# Patient Record
Sex: MALE | Race: WHITE | Employment: OTHER | URBAN - METROPOLITAN AREA
[De-identification: names, ages, dates, MRNs, and addresses within clinical notes are randomized per-mention and may not be internally consistent; named-entity substitution may affect disease eponyms.]

---

## 2018-07-26 ENCOUNTER — ANESTHESIA EVENT (OUTPATIENT)
Dept: ENDOSCOPY | Age: 53
End: 2018-07-26
Payer: COMMERCIAL

## 2018-07-26 RX ORDER — SODIUM CHLORIDE, SODIUM LACTATE, POTASSIUM CHLORIDE, CALCIUM CHLORIDE 600; 310; 30; 20 MG/100ML; MG/100ML; MG/100ML; MG/100ML
25 INJECTION, SOLUTION INTRAVENOUS CONTINUOUS
Status: CANCELLED | OUTPATIENT
Start: 2018-07-26 | End: 2018-07-27

## 2018-07-26 RX ORDER — SODIUM CHLORIDE 0.9 % (FLUSH) 0.9 %
5-10 SYRINGE (ML) INJECTION AS NEEDED
Status: CANCELLED | OUTPATIENT
Start: 2018-07-26

## 2018-07-27 ENCOUNTER — ANESTHESIA (OUTPATIENT)
Dept: ENDOSCOPY | Age: 53
End: 2018-07-27
Payer: COMMERCIAL

## 2018-08-16 RX ORDER — OXYCODONE HYDROCHLORIDE 5 MG/1
10 TABLET ORAL
Status: CANCELLED | OUTPATIENT
Start: 2018-08-16 | End: 2018-08-17

## 2018-08-16 RX ORDER — OXYCODONE HYDROCHLORIDE 5 MG/1
5 TABLET ORAL
Status: CANCELLED | OUTPATIENT
Start: 2018-08-16 | End: 2018-08-17

## 2018-08-16 RX ORDER — HYDROMORPHONE HYDROCHLORIDE 2 MG/ML
0.5 INJECTION, SOLUTION INTRAMUSCULAR; INTRAVENOUS; SUBCUTANEOUS
Status: CANCELLED | OUTPATIENT
Start: 2018-08-16

## 2018-08-16 RX ORDER — ACETAMINOPHEN 500 MG
500 TABLET ORAL ONCE
Status: CANCELLED | OUTPATIENT
Start: 2018-08-16 | End: 2018-08-16

## 2018-08-16 RX ORDER — ONDANSETRON 2 MG/ML
4 INJECTION INTRAMUSCULAR; INTRAVENOUS ONCE
Status: CANCELLED | OUTPATIENT
Start: 2018-08-16 | End: 2018-08-16

## 2018-08-16 RX ORDER — SODIUM CHLORIDE, SODIUM LACTATE, POTASSIUM CHLORIDE, CALCIUM CHLORIDE 600; 310; 30; 20 MG/100ML; MG/100ML; MG/100ML; MG/100ML
75 INJECTION, SOLUTION INTRAVENOUS CONTINUOUS
Status: CANCELLED | OUTPATIENT
Start: 2018-08-16

## 2018-08-16 RX ORDER — DIPHENHYDRAMINE HYDROCHLORIDE 50 MG/ML
12.5 INJECTION, SOLUTION INTRAMUSCULAR; INTRAVENOUS ONCE
Status: CANCELLED | OUTPATIENT
Start: 2018-08-16 | End: 2018-08-16

## 2018-08-16 RX ORDER — SODIUM CHLORIDE 0.9 % (FLUSH) 0.9 %
5-10 SYRINGE (ML) INJECTION AS NEEDED
Status: CANCELLED | OUTPATIENT
Start: 2018-08-16

## 2018-08-16 RX ORDER — NALOXONE HYDROCHLORIDE 0.4 MG/ML
0.1 INJECTION, SOLUTION INTRAMUSCULAR; INTRAVENOUS; SUBCUTANEOUS AS NEEDED
Status: CANCELLED | OUTPATIENT
Start: 2018-08-16 | End: 2018-08-16

## 2018-08-17 ENCOUNTER — HOSPITAL ENCOUNTER (OUTPATIENT)
Age: 53
Setting detail: OUTPATIENT SURGERY
Discharge: HOME | End: 2018-08-17
Attending: SURGERY | Admitting: SURGERY
Payer: COMMERCIAL

## 2018-08-17 VITALS
WEIGHT: 210 LBS | BODY MASS INDEX: 32.96 KG/M2 | SYSTOLIC BLOOD PRESSURE: 179 MMHG | DIASTOLIC BLOOD PRESSURE: 90 MMHG | OXYGEN SATURATION: 98 % | HEIGHT: 67 IN | HEART RATE: 86 BPM | RESPIRATION RATE: 18 BRPM | TEMPERATURE: 98.6 F

## 2018-08-17 PROCEDURE — 76060000032 HC ANESTHESIA 0.5 TO 1 HR: Performed by: SURGERY

## 2018-08-17 PROCEDURE — 74011250636 HC RX REV CODE- 250/636: Performed by: ANESTHESIOLOGY

## 2018-08-17 PROCEDURE — 74011250636 HC RX REV CODE- 250/636

## 2018-08-17 PROCEDURE — 76040000025: Performed by: SURGERY

## 2018-08-17 RX ORDER — SODIUM CHLORIDE 0.9 % (FLUSH) 0.9 %
5-10 SYRINGE (ML) INJECTION AS NEEDED
Status: DISCONTINUED | OUTPATIENT
Start: 2018-08-17 | End: 2018-08-17 | Stop reason: HOSPADM

## 2018-08-17 RX ORDER — FAMOTIDINE 20 MG/1
20 TABLET, FILM COATED ORAL
Status: DISCONTINUED | OUTPATIENT
Start: 2018-08-17 | End: 2018-08-17 | Stop reason: HOSPADM

## 2018-08-17 RX ORDER — PROPOFOL 10 MG/ML
INJECTION, EMULSION INTRAVENOUS
Status: DISCONTINUED | OUTPATIENT
Start: 2018-08-17 | End: 2018-08-17 | Stop reason: HOSPADM

## 2018-08-17 RX ORDER — SODIUM CHLORIDE, SODIUM LACTATE, POTASSIUM CHLORIDE, CALCIUM CHLORIDE 600; 310; 30; 20 MG/100ML; MG/100ML; MG/100ML; MG/100ML
1000 INJECTION, SOLUTION INTRAVENOUS CONTINUOUS
Status: DISCONTINUED | OUTPATIENT
Start: 2018-08-17 | End: 2018-08-17 | Stop reason: HOSPADM

## 2018-08-17 RX ORDER — LIDOCAINE HYDROCHLORIDE 10 MG/ML
0.1 INJECTION INFILTRATION; PERINEURAL AS NEEDED
Status: DISCONTINUED | OUTPATIENT
Start: 2018-08-17 | End: 2018-08-17 | Stop reason: HOSPADM

## 2018-08-17 RX ORDER — PROPOFOL 10 MG/ML
INJECTION, EMULSION INTRAVENOUS AS NEEDED
Status: DISCONTINUED | OUTPATIENT
Start: 2018-08-17 | End: 2018-08-17 | Stop reason: HOSPADM

## 2018-08-17 RX ORDER — LIDOCAINE HYDROCHLORIDE 20 MG/ML
INJECTION, SOLUTION EPIDURAL; INFILTRATION; INTRACAUDAL; PERINEURAL AS NEEDED
Status: DISCONTINUED | OUTPATIENT
Start: 2018-08-17 | End: 2018-08-17 | Stop reason: HOSPADM

## 2018-08-17 RX ORDER — SODIUM CHLORIDE 0.9 % (FLUSH) 0.9 %
5-10 SYRINGE (ML) INJECTION EVERY 8 HOURS
Status: DISCONTINUED | OUTPATIENT
Start: 2018-08-17 | End: 2018-08-17 | Stop reason: HOSPADM

## 2018-08-17 RX ORDER — MIDAZOLAM HYDROCHLORIDE 1 MG/ML
2 INJECTION, SOLUTION INTRAMUSCULAR; INTRAVENOUS
Status: DISCONTINUED | OUTPATIENT
Start: 2018-08-17 | End: 2018-08-17 | Stop reason: HOSPADM

## 2018-08-17 RX ORDER — FENTANYL CITRATE 50 UG/ML
100 INJECTION, SOLUTION INTRAMUSCULAR; INTRAVENOUS AS NEEDED
Status: DISCONTINUED | OUTPATIENT
Start: 2018-08-17 | End: 2018-08-17 | Stop reason: HOSPADM

## 2018-08-17 RX ORDER — SODIUM CHLORIDE, SODIUM LACTATE, POTASSIUM CHLORIDE, CALCIUM CHLORIDE 600; 310; 30; 20 MG/100ML; MG/100ML; MG/100ML; MG/100ML
100 INJECTION, SOLUTION INTRAVENOUS CONTINUOUS
Status: DISCONTINUED | OUTPATIENT
Start: 2018-08-17 | End: 2018-08-17 | Stop reason: HOSPADM

## 2018-08-17 RX ADMIN — PROPOFOL 20 MG: 10 INJECTION, EMULSION INTRAVENOUS at 12:20

## 2018-08-17 RX ADMIN — PROPOFOL 140 MCG/KG/MIN: 10 INJECTION, EMULSION INTRAVENOUS at 12:20

## 2018-08-17 RX ADMIN — PROPOFOL 80 MG: 10 INJECTION, EMULSION INTRAVENOUS at 12:19

## 2018-08-17 RX ADMIN — SODIUM CHLORIDE, SODIUM LACTATE, POTASSIUM CHLORIDE, AND CALCIUM CHLORIDE 1000 ML: 600; 310; 30; 20 INJECTION, SOLUTION INTRAVENOUS at 12:03

## 2018-08-17 RX ADMIN — LIDOCAINE HYDROCHLORIDE 50 MG: 20 INJECTION, SOLUTION EPIDURAL; INFILTRATION; INTRACAUDAL; PERINEURAL at 12:19

## 2018-08-17 NOTE — IP AVS SNAPSHOT
Michelet Gregorio 
 
 
 [address] [facility] [address] [facility] 
[phone] Patient: Best Banda MRN: [id number] Amaris King About your hospitalization You were admitted on:  August 17, 2018 You last received care in the:  [facility] You were discharged on:  August 17, 2018 Why you were hospitalized Your primary diagnosis was:  Not on File Follow-up Information None Your Scheduled Appointments Friday August 31, 2018  9:10 AM EDT Office Visit with Adry Martin MD  
[address] [place] [address]  
[phone] Discharge Orders None A check santy indicates which time of day the medication should be taken. My Medications ASK your doctor about these medications Instructions Each Dose to Equal  
 Morning Noon Evening Bedtime  
 amLODIPine 5 mg tablet Commonly known as:  Love Breach Your last dose was: Your next dose is: Take 1 Tab by mouth daily. 5 mg  
    
   
   
   
  
 aspirin delayed-release 81 mg tablet Your last dose was: Your next dose is: Take  by mouth daily. atorvastatin 20 mg tablet Commonly known as:  LIPITOR Your last dose was: Your next dose is: Take 1 Tab by mouth daily. 20 mg CALCIUM 600 + D 600-125 mg-unit Tab Generic drug:  calcium-cholecalciferol (d3) Your last dose was: Your next dose is: Take 1 Tab by mouth daily. 1 Tab COREG 25 mg tablet Generic drug:  carvedilol Your last dose was: Your next dose is: Take 25 mg by mouth two (2) times daily (with meals). 25 mg  
    
   
   
   
  
 nitroglycerin 0.4 mg SL tablet Commonly known as:  NITROSTAT Your last dose was: Your next dose is:    
   
   
 by SubLINGual route every five (5) minutes as needed for Chest Pain. omeprazole 20 mg tablet Commonly known as:  PRILOSEC OTC Your last dose was: Your next dose is: Take 20 mg by mouth daily. 20 mg  
    
   
   
   
  
 prasugrel 10 mg tablet Commonly known as:  EFFIENT Your last dose was: Your next dose is: Take 10 mg by mouth daily. Stopped 8/10/18 per pt per dr Sabrina Nissen and dr Edwardo Reagan 10 mg  
    
   
   
   
  
 venlafaxine- mg capsule Commonly known as:  EFFEXOR-XR Your last dose was: Your next dose is: Take 1 Cap by mouth daily. 150 mg  
    
   
   
   
  
 VITAMIN C 500 mg tablet Generic drug:  ascorbic acid (vitamin C) Your last dose was: Your next dose is: Take 500 mg by mouth daily. 500 mg Discharge Instructions Steve Boone M.D. 
[phone] Instructions following colonoscopy: 
 
ACTIVITY: 
? Resume usual, basic activities around the house today. ? You may be light-headed or sleepy from anesthesia, so be careful going up and down stairs. ? Avoid driving, operating machinery, or signing documents for 24 hours. DIET: 
? No restriction. Please note, some people may have nausea or cramps after this procedure which can result in an upset stomach after eating. ? Many people have loose stools or diarrhea immediately after colonoscopy. It is also not uncommon to not have a bowel movement for 2-3 days. PAIN: 
? Some cramping or gas pain is normal after colonoscopy. However, if you experience worsening pain over the course of the day, or pain with associated fever please call the office immediately CALL THE DOCTOR IF: 
? You have a temperature higher than 101.5° Fahrenheit for more than 6 hours. ? You have severe nausea or vomiting not relieved by medication; or diarrhea. If you take a blood thinning medicine resume it: 
 
Otherwise, continue home medications as previously prescribed. Introducing [facility]! [employer] introduces [email or web address] patient portal. Now you can access parts of your medical record, email your doctor's office, and request medication refills online. 1. In your internet browser, go to [email or web address] 2. Click on the First Time User? Click Here link in the Sign In box. You will see the New Member Sign Up page. 3. Enter your Rover.com Access Code exactly as it appears below. You will not need to use this code after youve completed the sign-up process. If you do not sign up before the expiration date, you must request a new code. · [email or web address] Access Code: [email or web address] Expires: 10/22/2018 10:58 AM 
 
4. Enter the last four digits of your Social Security Number (xxxx) and Date of Birth (mm/dd/yyyy) as indicated and click Submit. You will be taken to the next sign-up page. 5. Create a Rover.com ID. This will be your Rover.com login ID and cannot be changed, so think of one that is secure and easy to remember. 6. Create a Rover.com password. You can change your password at any time. 7. Enter your Password Reset Question and Answer. This can be used at a later time if you forget your password. 8. Enter your e-mail address. You will receive e-mail notification when new information is available in [address] 9. Click Sign Up. You can now view and download portions of your medical record. 10. Click the Download Summary menu link to download a portable copy of your medical information. If you have questions, please visit the Frequently Asked Questions section of the Rover.com website. Remember, Rover.com is NOT to be used for urgent needs. For medical emergencies, dial 911. Now available from your iPhone and Android! Introducing [employer] As a [facility] patient, I wanted to make you aware of our electronic visit tool called Sotero Villa. Snapwiz allows you to connect within minutes with a medical provider 24 hours a day, seven days a week via a mobile device or tablet or logging into a secure website from your computer. You can access Sotero Villa from anywhere in the [place]. A virtual visit might be right for you when you have a simple condition and feel like you just dont want to get out of bed, or cant get away from work for an appointment, when your regular [facility] provider is not available (evenings, weekends or holidays), or when youre out of town and need minor care. Electronic visits cost only $49 and if the [facility] provider determines a prescription is needed to treat your condition, one can be electronically transmitted to a nearby pharmacy*. Please take a moment to enroll today if you have not already done so. The enrollment process is free and takes just a few minutes. To enroll, please download the Snapwiz roshni to your tablet or phone, or visit [email or web address] to enroll on your computer. And, as an [address] [place] [address] patient with a BEKIZ account, the results of your visits will be scanned into your electronic medical record and your primary care provider will be able to view the scanned results. We urge you to continue to see your regular [facility] provider for your ongoing medical care. And while your primary care provider may not be the one available when you seek a Sotero Villa virtual visit, the peace of mind you get from getting a real diagnosis real time can be priceless. For more information on Sotero Villa, view our Frequently Asked Questions (FAQs) at [email or web address]. Sincerely, 
 
Chelsea Burnett MD 
Chief Medical Officer [facility] *:  certain medications cannot be prescribed via Sotero Villa Providers Seen During Your Hospitalization Provider Specialty Primary office phone Carolina Toribio MD General Surgery [phone] Your Primary Care Physician (PCP) Primary Care Physician Office Phone Office Fax [phone] [facility], [address] [place] [address] [phone] You are allergic to the following No active allergies Recent Documentation Height Weight BMI Smoking Status 1.702 m 95.3 kg 32.89 kg/m2 Current Every Day Smoker Emergency Contacts Name Discharge Info Relation Home Work Mobile Kimberley Perla  Spouse [3] [phone] Patient Belongings The following personal items are in your possession at time of discharge: 
  Dental Appliances: None  Visual Aid: None Please provide this summary of care documentation to your next provider. Signatures-by signing, you are acknowledging that this After Visit Summary has been reviewed with you and you have received a copy. Patient Signature:  ____________________________________________________________ Date:  ____________________________________________________________  
  
[facility] Signature:  ____________________________________________________________ Date:  ____________________________________________________________

## 2018-08-17 NOTE — PROCEDURES
Procedure in Detail:  Informed consent was obtained for the procedure. The patient was placed in the left lateral decubitus position and sedation was induced by anesthesia. The [id number] was inserted into the rectum and advanced under direct vision to the cecum, which was identified by the ileocecal valve and appendiceal orifice. The quality of the colonic preparation was adequate. A careful inspection was made as the colonoscope was withdrawn, including a retroflexed view of the rectum; findings and interventions are described below. Appropriate photodocumentation was obtained. Findings:   ANUS: Anal exam reveals no masses or hemorrhoids, sphincter tone is normal.   RECTUM: Rectal exam reveals no masses or hemorrhoids. SIGMOID COLON: The mucosa is normal with good vascular pattern and without ulcers, diverticula, and polyps. DESCENDING COLON: The mucosa is normal with good vascular pattern and without ulcers, diverticula, and polyps. SPLENIC FLEXURE: The splenic flexure is normal.   TRANSVERSE COLON: The mucosa is normal with good vascular pattern and without ulcers, diverticula, and polyps. HEPATIC FLEXURE: The hepatic flexure is normal.   ASCENDING COLON: The mucosa is normal with good vascular pattern and without ulcers, diverticula, and polyps. CECUM: The appendiceal orifice appears normal. The ileocecal valve appears normal.   TERMINAL ILEUM: The terminal ileum was not entered. Specimens: No specimens were collected. Complications: None; patient tolerated the procedure well. \    EBL - none    Recommendations:   - For colon cancer screening in this average-risk patient, colonoscopy may be repeated in 10 years.      Signed By: Abraham Liang MD                        August 17, 2018

## 2018-08-17 NOTE — H&P
Colonoscopy History and Physical      Patient: Hong Ladd    Physician: Mk Grace MD    Referring Physician: Avtar Mathews MD    Chief Complaint: For colonoscopy    History of Present Illness: Pt presents for colonoscopy. Initial study. History:  Past Medical History:   Diagnosis Date    CAD (coronary artery disease)     mi--2014--- cabg 2014--- followed by dr Marika Junior Depressive disorder, not elsewhere classified     Essential hypertension, benign     controlled with med    Obese     bmi 28    Pure hypercholesterolemia     Sleep apnea     dx 12 yrs ago-- per pt-- never had any follow up     Past Surgical History:   Procedure Laterality Date    CARDIAC SURG PROCEDURE UNLIST  2014    CABG     HX KNEE ARTHROSCOPY Right YRS AGO      Social History     Social History    Marital status:      Spouse name: N/A    Number of children: N/A    Years of education: N/A     Social History Main Topics    Smoking status: Current Every Day Smoker     Packs/day: 0.50     Years: 25.00    Smokeless tobacco: Never Used    Alcohol use Yes      Comment: occasional    Drug use: No    Sexual activity: Not on file     Other Topics Concern    Not on file     Social History Narrative      Family History   Problem Relation Age of Onset    Cancer Mother      leukemia    Heart Disease Father      CABG, both parents also, HTN and high lipids       Medications:   Prior to Admission medications    Medication Sig Start Date End Date Taking? Authorizing Provider   calcium-cholecalciferol, d3, (CALCIUM 600 + D) 600-125 mg-unit tab Take 1 Tab by mouth daily. Historical Provider   ascorbic acid, vitamin C, (VITAMIN C) 500 mg tablet Take 500 mg by mouth daily. Historical Provider   carvedilol (COREG) 25 mg tablet Take 25 mg by mouth two (2) times daily (with meals). Historical Provider   venlafaxine-SR [facility] 150 mg capsule Take 1 Cap by mouth daily.  1/19/18   Avtar Mathews MD amLODIPine (NORVASC) 5 mg tablet Take 1 Tab by mouth daily. 1/19/18   Juliette Kuo MD   atorvastatin (LIPITOR) 20 mg tablet Take 1 Tab by mouth daily. 1/19/18   Juliette Kuo MD   omeprazole (PRILOSEC OTC) 20 mg tablet Take 20 mg by mouth daily. 1/19/18   Juliette Kuo MD   aspirin delayed-release 81 mg tablet Take  by mouth daily. Historical Provider   nitroglycerin (NITROSTAT) 0.4 mg SL tablet by SubLINGual route every five (5) minutes as needed for Chest Pain. Historical Provider   prasugrel (EFFIENT) 10 mg tablet Take 10 mg by mouth daily. Stopped 8/10/18 per pt per dr Jaya Mccain and dr Dimas Gooden Provider       Allergies: No Known Allergies    Physical Exam:     Vital Signs:   Visit Vitals    Temp 98.3 °F (36.8 °C)    Ht 5' 7\" (1.702 m)    Wt 210 lb (95.3 kg)    BMI 32.89 kg/m2     . General: in NAD      Heart: regular   Lungs: unlabored   Abdominal: soft   Neurological: grossly normal        Findings/Diagnosis: Screening for colorectal cancer     Plan of Care/Planned Procedure: Colonoscopy. Risks of endoscopy include  bleeding, perforation. They understand and agree to proceed.       Signed:  Ryder Caballero MD   8/17/2018

## 2018-08-17 NOTE — ANESTHESIA PREPROCEDURE EVALUATION
Anesthetic History   No history of anesthetic complications            Review of Systems / Medical History  Patient summary reviewed and pertinent labs reviewed    Pulmonary        Sleep apnea           Neuro/Psych         Psychiatric history     Cardiovascular    Hypertension          CAD    Exercise tolerance: >4 METS  Comments: MI/CABG 2014   GI/Hepatic/Renal  Within defined limits              Endo/Other        Obesity     Other Findings              Physical Exam    Airway  Mallampati: II  TM Distance: 4 - 6 cm  Neck ROM: normal range of motion   Mouth opening: Normal     Cardiovascular    Rhythm: regular  Rate: normal         Dental  No notable dental hx       Pulmonary  Breath sounds clear to auscultation               Abdominal  GI exam deferred       Other Findings            Anesthetic Plan    ASA: 3  Anesthesia type: total IV anesthesia          Induction: Intravenous  Anesthetic plan and risks discussed with: Patient

## 2018-08-17 NOTE — DISCHARGE INSTRUCTIONS
Kodi Rogers M.D.  [phone]    Instructions following colonoscopy:    ACTIVITY:   Resume usual, basic activities around the house today.  You may be light-headed or sleepy from anesthesia, so be careful going up and down stairs.  Avoid driving, operating machinery, or signing documents for 24 hours. DIET:   No restriction. Please note, some people may have nausea or cramps after this procedure which can result in an upset stomach after eating.  Many people have loose stools or diarrhea immediately after colonoscopy. It is also not uncommon to not have a bowel movement for 2-3 days. PAIN:   Some cramping or gas pain is normal after colonoscopy. However, if you experience worsening pain over the course of the day, or pain with associated fever please call the office immediately      [address] IF:   You have a temperature higher than 101.5° Fahrenheit for more than 6 hours.  You have severe nausea or vomiting not relieved by medication; or diarrhea. If you take a blood thinning medicine resume it:    Otherwise, continue home medications as previously prescribed.

## 2018-08-17 NOTE — ANESTHESIA POSTPROCEDURE EVALUATION
Post-Anesthesia Evaluation and Assessment    Patient: Marquita Rojas MRN: [id number]  SSN: [id number]    YOB: 1965  Age: 45 y.o. Sex: male       Cardiovascular Function/Vital Signs  Visit Vitals    /88    Pulse 92    Temp 37 °C (98.6 °F)    Resp 16    Ht 5' 7\" (1.702 m)    Wt 95.3 kg (210 lb)    SpO2 97%    BMI 32.89 kg/m2       Patient is status post total IV anesthesia anesthesia for Procedure(s):  COLONOSCOPY/ 32.    Nausea/Vomiting: None    Postoperative hydration reviewed and adequate. Pain:  Pain Scale 1: Visual (08/17/18 1245)  Pain Intensity 1: 0 (08/17/18 1245)   Managed    Neurological Status: At baseline    Mental Status and Level of Consciousness: Arousable    Pulmonary Status:   O2 Device: CO2 nasal cannula (08/17/18 1246)   Adequate oxygenation and airway patent    Complications related to anesthesia: None    Post-anesthesia assessment completed.  No concerns    Signed By: Lucinda Otto MD     August 17, 2018

## 2018-10-09 PROBLEM — M48.062 LUMBAR STENOSIS WITH NEUROGENIC CLAUDICATION: Status: ACTIVE | Noted: 2018-10-09

## 2018-10-09 PROBLEM — M43.16 SPONDYLOLISTHESIS AT L3-L4 LEVEL: Status: ACTIVE | Noted: 2018-10-09

## 2018-11-12 ENCOUNTER — HOSPITAL ENCOUNTER (OUTPATIENT)
Dept: SURGERY | Age: 53
Discharge: HOME | DRG: 460 | End: 2018-11-12
Payer: COMMERCIAL

## 2018-11-12 VITALS
TEMPERATURE: 98.2 F | DIASTOLIC BLOOD PRESSURE: 80 MMHG | RESPIRATION RATE: 18 BRPM | HEIGHT: 69 IN | HEART RATE: 72 BPM | OXYGEN SATURATION: 98 % | WEIGHT: 210 LBS | SYSTOLIC BLOOD PRESSURE: 142 MMHG | BODY MASS INDEX: 31.1 KG/M2

## 2018-11-12 LAB
ANION GAP SERPL CALC-SCNC: 6 MMOL/L (ref 7–16)
APPEARANCE UR: CLEAR
BACTERIA SPEC CULT: NORMAL
BASOPHILS # BLD: 0.1 K/UL (ref 0–0.2)
BASOPHILS NFR BLD: 0 % (ref 0–2)
BILIRUB UR QL: NEGATIVE
BUN SERPL-MCNC: 17 MG/DL (ref 6–23)
CALCIUM SERPL-MCNC: 9.4 MG/DL (ref 8.3–10.4)
CHLORIDE SERPL-SCNC: 107 MMOL/L (ref 98–107)
CO2 SERPL-SCNC: 27 MMOL/L (ref 21–32)
COLOR UR: YELLOW
CREAT SERPL-MCNC: 0.79 MG/DL (ref 0.8–1.5)
DIFFERENTIAL METHOD BLD: ABNORMAL
EOSINOPHIL # BLD: 0.1 K/UL (ref 0–0.8)
EOSINOPHIL NFR BLD: 1 % (ref 0.5–7.8)
ERYTHROCYTE [DISTWIDTH] IN BLOOD BY AUTOMATED COUNT: 13.3 %
GLUCOSE SERPL-MCNC: 94 MG/DL (ref 65–100)
GLUCOSE UR STRIP.AUTO-MCNC: NEGATIVE MG/DL
HCT VFR BLD AUTO: 41.8 % (ref 41.1–50.3)
HGB BLD-MCNC: 13.6 G/DL (ref 13.6–17.2)
HGB UR QL STRIP: NEGATIVE
IMM GRANULOCYTES # BLD: 0.1 K/UL (ref 0–0.5)
IMM GRANULOCYTES NFR BLD AUTO: 1 % (ref 0–5)
KETONES UR QL STRIP.AUTO: NEGATIVE MG/DL
LEUKOCYTE ESTERASE UR QL STRIP.AUTO: NEGATIVE
LYMPHOCYTES # BLD: 3.6 K/UL (ref 0.5–4.6)
LYMPHOCYTES NFR BLD: 32 % (ref 13–44)
MCH RBC QN AUTO: 31.6 PG (ref 26.1–32.9)
MCHC RBC AUTO-ENTMCNC: 32.5 G/DL (ref 31.4–35)
MCV RBC AUTO: 97 FL (ref 79.6–97.8)
MONOCYTES # BLD: 1.1 K/UL (ref 0.1–1.3)
MONOCYTES NFR BLD: 10 % (ref 4–12)
NEUTS SEG # BLD: 6.3 K/UL (ref 1.7–8.2)
NEUTS SEG NFR BLD: 56 % (ref 43–78)
NITRITE UR QL STRIP.AUTO: NEGATIVE
NRBC # BLD: 0 K/UL (ref 0–0.2)
PH UR STRIP: 5 [PH] (ref 5–9)
PLATELET # BLD AUTO: 433 K/UL (ref 150–450)
PMV BLD AUTO: 9.4 FL (ref 9.4–12.3)
POTASSIUM SERPL-SCNC: 4.3 MMOL/L (ref 3.5–5.1)
PROT UR STRIP-MCNC: NEGATIVE MG/DL
RBC # BLD AUTO: 4.31 M/UL (ref 4.23–5.6)
SERVICE CMNT-IMP: NORMAL
SODIUM SERPL-SCNC: 140 MMOL/L (ref 136–145)
SP GR UR REFRACTOMETRY: 1.04 (ref 1–1.02)
UROBILINOGEN UR QL STRIP.AUTO: 0.2 EU/DL (ref 0.2–1)
WBC # BLD AUTO: 11.3 K/UL (ref 4.3–11.1)

## 2018-11-12 PROCEDURE — 85025 COMPLETE CBC W/AUTO DIFF WBC: CPT

## 2018-11-12 PROCEDURE — 81003 URINALYSIS AUTO W/O SCOPE: CPT

## 2018-11-12 PROCEDURE — 87641 MR-STAPH DNA AMP PROBE: CPT

## 2018-11-12 PROCEDURE — 80048 BASIC METABOLIC PNL TOTAL CA: CPT

## 2018-11-12 NOTE — PERIOP NOTES
Patient verified name, , and surgery Order for consent found in EHR and matches case posting. Pt had thought Dr Jonathon Landau mentioned something about his L5 being involved. Undersigned called the office and spoke with Fany Feli- she said Dr Solomon's H&P show it is only L3 and L4 he is working on but she will have the nurse, Gloria, call the pt to discuss TYPE  CASE: III  Preassessment complete Orders per surgeon: to include MRSA swab of nares Labs per surgeon: CBC, BMP ; UA. Results: all normal/ NEGATIVE SA Labs per anesthesia protocol: none EKG: In chart- stress test 9/3/18 Nasal Swab collected per MD order and instructions for Mupirocin nasal ointment if required. Patient provided with and instructed on education handouts including Guide to Surgery, blood transfusions, pain management, and hand hygiene for the family and community, and [facility] brochure. Road to Recovery Spine surgery patient guide given. Long handled prehab sponge given with instructions for use. Patient viewed spine prehab video. Hibiclens and instructions given per hospital policy. Instructed patient to continue previous medications as prescribed prior to surgery unless otherwise directed and to take the following medications the day of surgery according to anesthesia guidelines : norvasc, ASA 81mg, coreg, prilosec, effexor, tramadol prn . Instructed patient to hold  the following medications on the day of surgery: Malik Kessler is FU with cardiologist Dr Betty Hill. She had gotten permission from pt's PCP. Original medication prescription bottles visualized during patient appointment. Patient teach back successful and patient demonstrates knowledge of instruction.

## 2018-11-13 NOTE — H&P
Nickolas Harris [address] HISTORY AND PHYSICAL Derick Muniz 
MR#: [id number] : 1965 ACCOUNT #: [de-identified] ADMIT DATE: 2018 CHIEF COMPLAINT:  Lower extremity neurogenic claudication times years. HISTORY OF PRESENT ILLNESS:  A 67-year-old gentleman with a greater than 3-year history of neurogenic claudication, right greater than left, refractory to conservative measures. Aggressive conservative measures have been attempted. MRI scanning was positive for spinal stenosis and spondylolisthesis at L3-4 with a large right-sided disk protrusion centrally causing compression. ALLERGIES:  NONE. PAST MEDICAL HISTORY:  Significant for coronary artery disease, chronic pain, depression, hypertension, obesity, hypercholesterolemia and sleep apnea. FAMILY HISTORY:  Positive for cancer and heart disease. SOCIAL HISTORY:  Reveals he is . He is an every day smoker, half pack per day. He does use alcohol. REVIEW OF SYSTEMS:  Negative for chest pain, shortness of breath or fatigue. PHYSICAL EXAMINATION: 
VITAL SIGNS:  Stable, afebrile. HEENT:  Unremarkable. Nose and throat clear. CHEST:  Clear bilaterally. HEART:  Regular rate and rhythm. No murmurs or gallops. ABDOMEN:  Soft, benign, nontender, no masses. Bowel sounds positive. EXTREMITIES:  Free of deformities. NEUROLOGIC:  Awake, alert, oriented x3. Cranial nerves II-XII intact. Motor strength 5/5 except for the right quadriceps which is weak at 4/5. Normal bulk and tone. Reflexes symmetric. Sensation normal.  Antalgic gait. IMPRESSION:   
1. Lumbar spondylolisthesis with spinal stenosis, L3-L4. 2.  Neurogenic claudication. Refractory to conservative measures. PLAN:  L3-L4 transforaminal lumbar interbody fusion. The risks are higher due to tobacco abuse and he understands this.   The risks were thoroughly explained and include bleeding, infection, weakness, numbness, nonunion, malunion, hardware removal due to infection, persistent pain, neurological deficiency, heart attack, stroke and death. The patient understands and agrees to proceed. MD YO Butcher/OFELIA 
D: 11/13/2018 10:53 T: 11/13/2018 11:01 
JOB #: [id number]

## 2018-11-13 NOTE — PERIOP NOTES
See [employer]ient hold note in care everywhere. Note states ok to hold after 11/18/18. Surgery is for 11/14/18. Spoke with Felipe Severino at Dr. Cayla Adler office and she states she spoke to staff at [facility] and was told told this date was in error. Ok to hold 5 days prior to surgery. Felipe Severino was told staff would put a note in care everywhere today, stating this. Note not yet visible.

## 2018-11-14 ENCOUNTER — ANESTHESIA EVENT (OUTPATIENT)
Dept: SURGERY | Age: 53
DRG: 460 | End: 2018-11-14
Payer: COMMERCIAL

## 2018-11-14 ENCOUNTER — ANESTHESIA (OUTPATIENT)
Dept: SURGERY | Age: 53
DRG: 460 | End: 2018-11-14
Payer: COMMERCIAL

## 2018-11-14 ENCOUNTER — APPOINTMENT (OUTPATIENT)
Dept: GENERAL RADIOLOGY | Age: 53
DRG: 460 | End: 2018-11-14
Attending: NEUROLOGICAL SURGERY
Payer: COMMERCIAL

## 2018-11-14 ENCOUNTER — HOSPITAL ENCOUNTER (INPATIENT)
Age: 53
LOS: 1 days | Discharge: HOME | DRG: 460 | End: 2018-11-15
Attending: NEUROLOGICAL SURGERY | Admitting: NEUROLOGICAL SURGERY
Payer: COMMERCIAL

## 2018-11-14 DIAGNOSIS — M43.16 SPONDYLOLISTHESIS AT L3-L4 LEVEL: ICD-10-CM

## 2018-11-14 LAB
ABO + RH BLD: NORMAL
BLOOD GROUP ANTIBODIES SERPL: NORMAL
SPECIMEN EXP DATE BLD: NORMAL

## 2018-11-14 PROCEDURE — 74011250636 HC RX REV CODE- 250/636: Performed by: NEUROLOGICAL SURGERY

## 2018-11-14 PROCEDURE — C1713 ANCHOR/SCREW BN/BN,TIS/BN: HCPCS | Performed by: NEUROLOGICAL SURGERY

## 2018-11-14 PROCEDURE — 74011250636 HC RX REV CODE- 250/636

## 2018-11-14 PROCEDURE — 77030034475 HC MISC IMPL SPN: Performed by: NEUROLOGICAL SURGERY

## 2018-11-14 PROCEDURE — 77030039267 HC ADH SKN EXOFIN S2SG -B: Performed by: NEUROLOGICAL SURGERY

## 2018-11-14 PROCEDURE — 01NB0ZZ RELEASE LUMBAR NERVE, OPEN APPROACH: ICD-10-PCS | Performed by: NEUROLOGICAL SURGERY

## 2018-11-14 PROCEDURE — 77030028270 HC SRGFL HEMSTAT MTRX J&J -C: Performed by: NEUROLOGICAL SURGERY

## 2018-11-14 PROCEDURE — 0SG00AJ FUSION OF LUMBAR VERTEBRAL JOINT WITH INTERBODY FUSION DEVICE, POSTERIOR APPROACH, ANTERIOR COLUMN, OPEN APPROACH: ICD-10-PCS | Performed by: NEUROLOGICAL SURGERY

## 2018-11-14 PROCEDURE — 77030013567 HC DRN WND RESERV BARD -A: Performed by: NEUROLOGICAL SURGERY

## 2018-11-14 PROCEDURE — 72100 X-RAY EXAM L-S SPINE 2/3 VWS: CPT

## 2018-11-14 PROCEDURE — 77030030163 HC BN WAX J&J -A: Performed by: NEUROLOGICAL SURGERY

## 2018-11-14 PROCEDURE — 77030032490 HC SLV COMPR SCD KNE COVD -B: Performed by: NEUROLOGICAL SURGERY

## 2018-11-14 PROCEDURE — 74011250637 HC RX REV CODE- 250/637: Performed by: NEUROLOGICAL SURGERY

## 2018-11-14 PROCEDURE — 74011000250 HC RX REV CODE- 250: Performed by: NEUROLOGICAL SURGERY

## 2018-11-14 PROCEDURE — 88304 TISSUE EXAM BY PATHOLOGIST: CPT

## 2018-11-14 PROCEDURE — 76210000017 HC OR PH I REC 1.5 TO 2 HR: Performed by: NEUROLOGICAL SURGERY

## 2018-11-14 PROCEDURE — 74011250636 HC RX REV CODE- 250/636: Performed by: ANESTHESIOLOGY

## 2018-11-14 PROCEDURE — 77030014007 HC SPNG HEMSTAT J&J -B: Performed by: NEUROLOGICAL SURGERY

## 2018-11-14 PROCEDURE — 74011000272 HC RX REV CODE- 272: Performed by: NEUROLOGICAL SURGERY

## 2018-11-14 PROCEDURE — 65270000029 HC RM PRIVATE

## 2018-11-14 PROCEDURE — 77030037088 HC TUBE ENDOTRACH ORAL NSL COVD-A: Performed by: ANESTHESIOLOGY

## 2018-11-14 PROCEDURE — 77030003029 HC SUT VCRL J&J -B: Performed by: NEUROLOGICAL SURGERY

## 2018-11-14 PROCEDURE — 76060000035 HC ANESTHESIA 2 TO 2.5 HR: Performed by: NEUROLOGICAL SURGERY

## 2018-11-14 PROCEDURE — 77030021678 HC GLIDESCP STAT DISP VERT -B: Performed by: ANESTHESIOLOGY

## 2018-11-14 PROCEDURE — 74011250637 HC RX REV CODE- 250/637: Performed by: ANESTHESIOLOGY

## 2018-11-14 PROCEDURE — 77030019557 HC ELECTRD VES SEAL MEDT -F: Performed by: NEUROLOGICAL SURGERY

## 2018-11-14 PROCEDURE — 77030034760 HC NDL BN MAR ASPIR JAMSH STRY -B: Performed by: NEUROLOGICAL SURGERY

## 2018-11-14 PROCEDURE — 77030012406 HC DRN WND PENRS BARD -A: Performed by: NEUROLOGICAL SURGERY

## 2018-11-14 PROCEDURE — 77030002916 HC SUT ETHLN J&J -A: Performed by: NEUROLOGICAL SURGERY

## 2018-11-14 PROCEDURE — 77030018390 HC SPNG HEMSTAT2 J&J -B: Performed by: NEUROLOGICAL SURGERY

## 2018-11-14 PROCEDURE — 77030027138 HC INCENT SPIROMETER -A

## 2018-11-14 PROCEDURE — 77030018836 HC SOL IRR NACL ICUM -A: Performed by: NEUROLOGICAL SURGERY

## 2018-11-14 PROCEDURE — 76010000171 HC OR TIME 2 TO 2.5 HR INTENSV-TIER 1: Performed by: NEUROLOGICAL SURGERY

## 2018-11-14 PROCEDURE — 77030034850: Performed by: NEUROLOGICAL SURGERY

## 2018-11-14 PROCEDURE — 86901 BLOOD TYPING SEROLOGIC RH(D): CPT

## 2018-11-14 PROCEDURE — 77030038601 HC DEV SYS W/CANN LITE BIO STRY -F: Performed by: NEUROLOGICAL SURGERY

## 2018-11-14 PROCEDURE — 77030019940 HC BLNKT HYPOTHRM STRY -B: Performed by: ANESTHESIOLOGY

## 2018-11-14 PROCEDURE — 74011000250 HC RX REV CODE- 250

## 2018-11-14 PROCEDURE — 77030039194 HC KT NEURO MONITR ASTU -G: Performed by: NEUROLOGICAL SURGERY

## 2018-11-14 DEVICE — GRAFT BNE SUB 5CC 2MM GRAN ALLOGENIC MORPHOGENETIC PROT W: Type: IMPLANTABLE DEVICE | Site: SPINE LUMBAR | Status: FUNCTIONAL

## 2018-11-14 DEVICE — 6.5MM X 50MM CORTICAL BONE SCREW
Type: IMPLANTABLE DEVICE | Site: SPINE LUMBAR | Status: FUNCTIONAL
Brand: JANUS

## 2018-11-14 DEVICE — TOP LOADING BODY
Type: IMPLANTABLE DEVICE | Site: SPINE LUMBAR | Status: FUNCTIONAL
Brand: FIREBIRD NXG

## 2018-11-14 DEVICE — 50MM ROD, PRE-LORDOSED
Type: IMPLANTABLE DEVICE | Site: SPINE LUMBAR | Status: FUNCTIONAL
Brand: FIREBIRD

## 2018-11-14 RX ORDER — SODIUM CHLORIDE 0.9 % (FLUSH) 0.9 %
5-10 SYRINGE (ML) INJECTION AS NEEDED
Status: DISCONTINUED | OUTPATIENT
Start: 2018-11-14 | End: 2018-11-15 | Stop reason: HOSPADM

## 2018-11-14 RX ORDER — MIDAZOLAM HYDROCHLORIDE 1 MG/ML
2 INJECTION, SOLUTION INTRAMUSCULAR; INTRAVENOUS ONCE
Status: ACTIVE | OUTPATIENT
Start: 2018-11-14 | End: 2018-11-14

## 2018-11-14 RX ORDER — ACETAMINOPHEN 10 MG/ML
1000 INJECTION, SOLUTION INTRAVENOUS
Status: COMPLETED | OUTPATIENT
Start: 2018-11-14 | End: 2018-11-14

## 2018-11-14 RX ORDER — PROPOFOL 10 MG/ML
INJECTION, EMULSION INTRAVENOUS AS NEEDED
Status: DISCONTINUED | OUTPATIENT
Start: 2018-11-14 | End: 2018-11-14 | Stop reason: HOSPADM

## 2018-11-14 RX ORDER — OXYCODONE AND ACETAMINOPHEN 10; 325 MG/1; MG/1
1 TABLET ORAL
Status: DISCONTINUED | OUTPATIENT
Start: 2018-11-14 | End: 2018-11-15 | Stop reason: HOSPADM

## 2018-11-14 RX ORDER — FENTANYL CITRATE 50 UG/ML
100 INJECTION, SOLUTION INTRAMUSCULAR; INTRAVENOUS ONCE
Status: ACTIVE | OUTPATIENT
Start: 2018-11-14 | End: 2018-11-14

## 2018-11-14 RX ORDER — SUCCINYLCHOLINE CHLORIDE 20 MG/ML
INJECTION INTRAMUSCULAR; INTRAVENOUS AS NEEDED
Status: DISCONTINUED | OUTPATIENT
Start: 2018-11-14 | End: 2018-11-14 | Stop reason: HOSPADM

## 2018-11-14 RX ORDER — ATORVASTATIN CALCIUM 10 MG/1
20 TABLET, FILM COATED ORAL DAILY
Status: DISCONTINUED | OUTPATIENT
Start: 2018-11-15 | End: 2018-11-15 | Stop reason: HOSPADM

## 2018-11-14 RX ORDER — PANTOPRAZOLE SODIUM 40 MG/1
40 TABLET, DELAYED RELEASE ORAL
Status: DISCONTINUED | OUTPATIENT
Start: 2018-11-15 | End: 2018-11-15 | Stop reason: HOSPADM

## 2018-11-14 RX ORDER — SODIUM CHLORIDE 0.9 % (FLUSH) 0.9 %
5-10 SYRINGE (ML) INJECTION EVERY 8 HOURS
Status: DISCONTINUED | OUTPATIENT
Start: 2018-11-14 | End: 2018-11-15 | Stop reason: SDUPTHER

## 2018-11-14 RX ORDER — FAMOTIDINE 20 MG/1
20 TABLET, FILM COATED ORAL ONCE
Status: COMPLETED | OUTPATIENT
Start: 2018-11-14 | End: 2018-11-14

## 2018-11-14 RX ORDER — CARVEDILOL 25 MG/1
25 TABLET ORAL
Status: COMPLETED | OUTPATIENT
Start: 2018-11-14 | End: 2018-11-14

## 2018-11-14 RX ORDER — SODIUM CHLORIDE, SODIUM LACTATE, POTASSIUM CHLORIDE, CALCIUM CHLORIDE 600; 310; 30; 20 MG/100ML; MG/100ML; MG/100ML; MG/100ML
75 INJECTION, SOLUTION INTRAVENOUS CONTINUOUS
Status: DISCONTINUED | OUTPATIENT
Start: 2018-11-14 | End: 2018-11-15 | Stop reason: HOSPADM

## 2018-11-14 RX ORDER — SODIUM CHLORIDE, SODIUM LACTATE, POTASSIUM CHLORIDE, CALCIUM CHLORIDE 600; 310; 30; 20 MG/100ML; MG/100ML; MG/100ML; MG/100ML
75 INJECTION, SOLUTION INTRAVENOUS CONTINUOUS
Status: DISCONTINUED | OUTPATIENT
Start: 2018-11-14 | End: 2018-11-14 | Stop reason: HOSPADM

## 2018-11-14 RX ORDER — AMLODIPINE BESYLATE 5 MG/1
5 TABLET ORAL DAILY
Status: DISCONTINUED | OUTPATIENT
Start: 2018-11-15 | End: 2018-11-15 | Stop reason: HOSPADM

## 2018-11-14 RX ORDER — ZOLPIDEM TARTRATE 5 MG/1
10 TABLET ORAL
Status: DISCONTINUED | OUTPATIENT
Start: 2018-11-14 | End: 2018-11-15 | Stop reason: HOSPADM

## 2018-11-14 RX ORDER — ACETAMINOPHEN 325 MG/1
650 TABLET ORAL
Status: DISCONTINUED | OUTPATIENT
Start: 2018-11-14 | End: 2018-11-15 | Stop reason: HOSPADM

## 2018-11-14 RX ORDER — HYDROMORPHONE HYDROCHLORIDE 1 MG/ML
1 INJECTION, SOLUTION INTRAMUSCULAR; INTRAVENOUS; SUBCUTANEOUS
Status: DISCONTINUED | OUTPATIENT
Start: 2018-11-14 | End: 2018-11-15 | Stop reason: HOSPADM

## 2018-11-14 RX ORDER — LISINOPRIL 20 MG/1
20 TABLET ORAL DAILY
Status: DISCONTINUED | OUTPATIENT
Start: 2018-11-15 | End: 2018-11-15 | Stop reason: HOSPADM

## 2018-11-14 RX ORDER — CEFAZOLIN SODIUM/WATER 2 G/20 ML
2 SYRINGE (ML) INTRAVENOUS EVERY 8 HOURS
Status: DISCONTINUED | OUTPATIENT
Start: 2018-11-14 | End: 2018-11-15 | Stop reason: HOSPADM

## 2018-11-14 RX ORDER — OXYCODONE HYDROCHLORIDE 5 MG/1
5 TABLET ORAL
Status: DISCONTINUED | OUTPATIENT
Start: 2018-11-14 | End: 2018-11-14 | Stop reason: HOSPADM

## 2018-11-14 RX ORDER — CEFAZOLIN SODIUM/WATER 2 G/20 ML
2 SYRINGE (ML) INTRAVENOUS ONCE
Status: COMPLETED | OUTPATIENT
Start: 2018-11-14 | End: 2018-11-14

## 2018-11-14 RX ORDER — ROCURONIUM BROMIDE 10 MG/ML
INJECTION, SOLUTION INTRAVENOUS AS NEEDED
Status: DISCONTINUED | OUTPATIENT
Start: 2018-11-14 | End: 2018-11-14 | Stop reason: HOSPADM

## 2018-11-14 RX ORDER — EPHEDRINE SULFATE 50 MG/ML
INJECTION, SOLUTION INTRAVENOUS AS NEEDED
Status: DISCONTINUED | OUTPATIENT
Start: 2018-11-14 | End: 2018-11-14 | Stop reason: HOSPADM

## 2018-11-14 RX ORDER — VENLAFAXINE HYDROCHLORIDE 150 MG/1
150 CAPSULE, EXTENDED RELEASE ORAL DAILY
Status: DISCONTINUED | OUTPATIENT
Start: 2018-11-15 | End: 2018-11-15 | Stop reason: HOSPADM

## 2018-11-14 RX ORDER — HYDROMORPHONE HYDROCHLORIDE 2 MG/ML
0.5 INJECTION, SOLUTION INTRAMUSCULAR; INTRAVENOUS; SUBCUTANEOUS
Status: DISCONTINUED | OUTPATIENT
Start: 2018-11-14 | End: 2018-11-14 | Stop reason: HOSPADM

## 2018-11-14 RX ORDER — CARVEDILOL 25 MG/1
25 TABLET ORAL 2 TIMES DAILY WITH MEALS
Status: DISCONTINUED | OUTPATIENT
Start: 2018-11-14 | End: 2018-11-15 | Stop reason: HOSPADM

## 2018-11-14 RX ORDER — GLYCOPYRROLATE 0.2 MG/ML
INJECTION INTRAMUSCULAR; INTRAVENOUS AS NEEDED
Status: DISCONTINUED | OUTPATIENT
Start: 2018-11-14 | End: 2018-11-14 | Stop reason: HOSPADM

## 2018-11-14 RX ORDER — NEOSTIGMINE METHYLSULFATE 1 MG/ML
INJECTION INTRAVENOUS AS NEEDED
Status: DISCONTINUED | OUTPATIENT
Start: 2018-11-14 | End: 2018-11-14 | Stop reason: HOSPADM

## 2018-11-14 RX ORDER — LIDOCAINE HYDROCHLORIDE 10 MG/ML
0.1 INJECTION INFILTRATION; PERINEURAL AS NEEDED
Status: DISCONTINUED | OUTPATIENT
Start: 2018-11-14 | End: 2018-11-15 | Stop reason: HOSPADM

## 2018-11-14 RX ORDER — LIDOCAINE HYDROCHLORIDE 20 MG/ML
INJECTION, SOLUTION EPIDURAL; INFILTRATION; INTRACAUDAL; PERINEURAL AS NEEDED
Status: DISCONTINUED | OUTPATIENT
Start: 2018-11-14 | End: 2018-11-14 | Stop reason: HOSPADM

## 2018-11-14 RX ORDER — HYDROMORPHONE HYDROCHLORIDE 2 MG/ML
1 INJECTION, SOLUTION INTRAMUSCULAR; INTRAVENOUS; SUBCUTANEOUS ONCE
Status: COMPLETED | OUTPATIENT
Start: 2018-11-14 | End: 2018-11-14

## 2018-11-14 RX ORDER — ONDANSETRON 2 MG/ML
INJECTION INTRAMUSCULAR; INTRAVENOUS AS NEEDED
Status: DISCONTINUED | OUTPATIENT
Start: 2018-11-14 | End: 2018-11-14 | Stop reason: HOSPADM

## 2018-11-14 RX ORDER — OXYCODONE HYDROCHLORIDE 5 MG/1
10 TABLET ORAL
Status: DISCONTINUED | OUTPATIENT
Start: 2018-11-14 | End: 2018-11-14 | Stop reason: HOSPADM

## 2018-11-14 RX ORDER — FENTANYL CITRATE 50 UG/ML
INJECTION, SOLUTION INTRAMUSCULAR; INTRAVENOUS AS NEEDED
Status: DISCONTINUED | OUTPATIENT
Start: 2018-11-14 | End: 2018-11-14 | Stop reason: HOSPADM

## 2018-11-14 RX ORDER — SODIUM CHLORIDE 0.9 % (FLUSH) 0.9 %
5-10 SYRINGE (ML) INJECTION EVERY 8 HOURS
Status: DISCONTINUED | OUTPATIENT
Start: 2018-11-14 | End: 2018-11-15 | Stop reason: HOSPADM

## 2018-11-14 RX ORDER — SODIUM CHLORIDE 0.9 % (FLUSH) 0.9 %
5-10 SYRINGE (ML) INJECTION AS NEEDED
Status: DISCONTINUED | OUTPATIENT
Start: 2018-11-14 | End: 2018-11-15 | Stop reason: SDUPTHER

## 2018-11-14 RX ADMIN — SODIUM CHLORIDE, SODIUM LACTATE, POTASSIUM CHLORIDE, AND CALCIUM CHLORIDE 75 ML/HR: 600; 310; 30; 20 INJECTION, SOLUTION INTRAVENOUS at 06:58

## 2018-11-14 RX ADMIN — EPHEDRINE SULFATE 10 MG: 50 INJECTION, SOLUTION INTRAVENOUS at 09:03

## 2018-11-14 RX ADMIN — HYDROMORPHONE HYDROCHLORIDE 0.5 MG: 2 INJECTION, SOLUTION INTRAMUSCULAR; INTRAVENOUS; SUBCUTANEOUS at 10:38

## 2018-11-14 RX ADMIN — VANCOMYCIN HYDROCHLORIDE 1000 MG: 1 INJECTION, POWDER, LYOPHILIZED, FOR SOLUTION INTRAVENOUS at 06:56

## 2018-11-14 RX ADMIN — SODIUM CHLORIDE, SODIUM LACTATE, POTASSIUM CHLORIDE, AND CALCIUM CHLORIDE 75 ML/HR: 600; 310; 30; 20 INJECTION, SOLUTION INTRAVENOUS at 12:32

## 2018-11-14 RX ADMIN — HYDROMORPHONE HYDROCHLORIDE 1 MG: 2 INJECTION, SOLUTION INTRAMUSCULAR; INTRAVENOUS; SUBCUTANEOUS at 07:18

## 2018-11-14 RX ADMIN — PROPOFOL 200 MG: 10 INJECTION, EMULSION INTRAVENOUS at 08:00

## 2018-11-14 RX ADMIN — OXYCODONE HYDROCHLORIDE AND ACETAMINOPHEN 1 TABLET: 10; 325 TABLET ORAL at 17:48

## 2018-11-14 RX ADMIN — HYDROMORPHONE HYDROCHLORIDE 0.5 MG: 2 INJECTION, SOLUTION INTRAMUSCULAR; INTRAVENOUS; SUBCUTANEOUS at 10:30

## 2018-11-14 RX ADMIN — HYDROMORPHONE HYDROCHLORIDE 1 MG: 1 INJECTION, SOLUTION INTRAMUSCULAR; INTRAVENOUS; SUBCUTANEOUS at 15:45

## 2018-11-14 RX ADMIN — HYDROMORPHONE HYDROCHLORIDE 0.5 MG: 2 INJECTION, SOLUTION INTRAMUSCULAR; INTRAVENOUS; SUBCUTANEOUS at 10:54

## 2018-11-14 RX ADMIN — Medication 1 AMPULE: at 13:08

## 2018-11-14 RX ADMIN — SUCCINYLCHOLINE CHLORIDE 180 MG: 20 INJECTION INTRAMUSCULAR; INTRAVENOUS at 08:00

## 2018-11-14 RX ADMIN — GLYCOPYRROLATE 0.6 MG: 0.2 INJECTION INTRAMUSCULAR; INTRAVENOUS at 09:59

## 2018-11-14 RX ADMIN — SODIUM CHLORIDE, SODIUM LACTATE, POTASSIUM CHLORIDE, AND CALCIUM CHLORIDE 75 ML/HR: 600; 310; 30; 20 INJECTION, SOLUTION INTRAVENOUS at 23:48

## 2018-11-14 RX ADMIN — ACETAMINOPHEN 1000 MG: 10 INJECTION, SOLUTION INTRAVENOUS at 10:44

## 2018-11-14 RX ADMIN — OXYCODONE HYDROCHLORIDE AND ACETAMINOPHEN 1 TABLET: 10; 325 TABLET ORAL at 22:18

## 2018-11-14 RX ADMIN — FENTANYL CITRATE 100 MCG: 50 INJECTION, SOLUTION INTRAMUSCULAR; INTRAVENOUS at 08:00

## 2018-11-14 RX ADMIN — FAMOTIDINE 20 MG: 20 TABLET ORAL at 06:58

## 2018-11-14 RX ADMIN — CARVEDILOL 25 MG: 25 TABLET, FILM COATED ORAL at 07:34

## 2018-11-14 RX ADMIN — ROCURONIUM BROMIDE 5 MG: 10 INJECTION, SOLUTION INTRAVENOUS at 08:00

## 2018-11-14 RX ADMIN — SODIUM CHLORIDE, SODIUM LACTATE, POTASSIUM CHLORIDE, AND CALCIUM CHLORIDE: 600; 310; 30; 20 INJECTION, SOLUTION INTRAVENOUS at 09:33

## 2018-11-14 RX ADMIN — Medication 1 AMPULE: at 20:03

## 2018-11-14 RX ADMIN — CARVEDILOL 25 MG: 25 TABLET, FILM COATED ORAL at 16:01

## 2018-11-14 RX ADMIN — LIDOCAINE HYDROCHLORIDE 100 MG: 20 INJECTION, SOLUTION EPIDURAL; INFILTRATION; INTRACAUDAL; PERINEURAL at 08:00

## 2018-11-14 RX ADMIN — HYDROMORPHONE HYDROCHLORIDE 1 MG: 1 INJECTION, SOLUTION INTRAMUSCULAR; INTRAVENOUS; SUBCUTANEOUS at 20:02

## 2018-11-14 RX ADMIN — Medication 2 G: at 08:15

## 2018-11-14 RX ADMIN — OXYCODONE HYDROCHLORIDE AND ACETAMINOPHEN 1 TABLET: 10; 325 TABLET ORAL at 13:08

## 2018-11-14 RX ADMIN — Medication 3 AMPULE: at 06:58

## 2018-11-14 RX ADMIN — ROCURONIUM BROMIDE 45 MG: 10 INJECTION, SOLUTION INTRAVENOUS at 08:07

## 2018-11-14 RX ADMIN — Medication 2 G: at 15:54

## 2018-11-14 RX ADMIN — Medication 10 ML: at 13:09

## 2018-11-14 RX ADMIN — ONDANSETRON 4 MG: 2 INJECTION INTRAMUSCULAR; INTRAVENOUS at 09:46

## 2018-11-14 RX ADMIN — NEOSTIGMINE METHYLSULFATE 4 MG: 1 INJECTION INTRAVENOUS at 09:59

## 2018-11-14 NOTE — ANESTHESIA PREPROCEDURE EVALUATION
Anesthetic History No history of anesthetic complications Review of Systems / Medical History Patient summary reviewed and pertinent labs reviewed Pulmonary Sleep apnea Neuro/Psych Psychiatric history Cardiovascular Hypertension CAD Exercise tolerance: >4 METS Comments: MI/CABG 2014 GI/Hepatic/Renal 
Within defined limits Endo/Other Obesity Other Findings Physical Exam 
 
Airway Mallampati: II 
TM Distance: > 6 cm Neck ROM: normal range of motion Mouth opening: Normal 
 
 Cardiovascular Rhythm: regular Rate: normal 
 
 
 
 Dental 
No notable dental hx Pulmonary Breath sounds clear to auscultation Abdominal 
GI exam deferred Other Findings Anesthetic Plan ASA: 3 Anesthesia type: general 
 
 
 
 
Induction: Intravenous Anesthetic plan and risks discussed with: Patient

## 2018-11-14 NOTE — ANESTHESIA POSTPROCEDURE EVALUATION
Procedure(s): 
L3-4 SPINE TRANSFORAMINAL LUMBAR INTERBODY FUSION (TLIF). Anesthesia Post Evaluation Multimodal analgesia: multimodal analgesia not used between 6 hours prior to anesthesia start to PACU discharge Patient location during evaluation: PACU Patient participation: complete - patient participated Level of consciousness: awake and alert Pain management: adequate Airway patency: patent Anesthetic complications: no 
Cardiovascular status: hemodynamically stable Respiratory status: acceptable Hydration status: acceptable Visit Vitals /68 Pulse 72 Temp 36.3 °C (97.4 °F) Resp 16 Ht 5' 8.5\" (1.74 m) Wt 96.6 kg (212 lb 14.4 oz) SpO2 98% BMI 31.90 kg/m²

## 2018-11-14 NOTE — PROGRESS NOTES
TRANSFER - IN REPORT: 
 
Verbal report received from Anahi RN(name) on Homero Kaplan  being received from [employer]) for routine post - op Report consisted of patients Situation, Background, Assessment and  
Recommendations(SBAR). Information from the following report(s) was reviewed with the receiving nurse. MAR SBAR OR procedure Opportunity for questions and clarification was provided. Assessment completed upon patients arrival to unit and care assumed.

## 2018-11-14 NOTE — PERIOP NOTES
TRANSFER - OUT REPORT: 
 
Verbal report given to Edda Meyers RN(name) on [facility]  being transferred to [facility](unit) for routine post - op Report consisted of patients Situation, Background, Assessment and  
Recommendations(SBAR). Information from the following report(s) SBAR, Kardex, OR Summary, Procedure Summary, Intake/Output and MAR was reviewed with the receiving nurse. Lines:  
Peripheral IV 11/14/18 Right Hand (Active) Site Assessment Clean, dry, & intact 11/14/2018 11:08 AM  
Phlebitis Assessment 0 11/14/2018 11:08 AM  
Infiltration Assessment 0 11/14/2018 11:08 AM  
Dressing Status Clean, dry, & intact 11/14/2018 11:08 AM  
Dressing Type Tape;Transparent 11/14/2018 11:08 AM  
Hub Color/Line Status Infusing 11/14/2018 11:08 AM  
Alcohol Cap Used No 11/14/2018 11:08 AM  
  
 
Opportunity for questions and clarification was provided. Patient transported with: 
 oxygen 4 L nc 
 
VTE prophylaxis orders have been written for [facility]. Patient and family given floor number and nurses name. Family updated re: pt status after security code verified.

## 2018-11-14 NOTE — PROGRESS NOTES
11/14/18 1211 Dual Skin Pressure Injury Assessment Dual Skin Pressure Injury Assessment WDL Second Care Provider (Based on [address] [place] [address]) [email or web address] Skin Integumentary Skin Integumentary (WDL) X Skin Integrity Incision (comment) (back)

## 2018-11-14 NOTE — OP NOTES
[facility] REPORT    Name:Alaina ROCHA  MR#: [id number]  : 1965  ACCOUNT #: [de-identified]   DATE OF SERVICE: 2018    PREOPERATIVE DIAGNOSIS:  L3-4 spondylolisthesis and spinal stenosis. POSTOPERATIVE DIAGNOSIS:  L3-4 spondylolisthesis and spinal stenosis. PROCEDURES PERFORMED:  1. Left L3-4 laminectomy, facetectomy, and diskectomy. 2.  L3-4 transforaminal lumbar interbody fusion with expandable Spine Wave cage, autograft bone, OsteoAMP, and bone marrow aspirate. 3.  Pedicle screw fusion, Orthofix, L3-4.  4.  Lateral transverse process fusion, L3-4, with autograft bone, bone marrow aspirate, and OsteoAMP. 5.  Bone marrow aspiration, L3 vertebra. 6.  Morcellized autograft harvest.  7.  Continuous intraoperative fluoroscopy. 8.  Continuous intraoperative EMG monitoring. SURGEON:  Lorena Wolf MD    ASSISTANT:  None. ANESTHESIA:  General endotracheal.    ESTIMATED BLOOD LOSS:  Minimal.    PREPARATION:  ChloraPrep. COMPLICATIONS:  None. SPECIMENS REMOVED:  L3-4 disk. IMPLANTS:  See below. HISTORY OF PRESENT ILLNESS:  A 18-year-old gentleman with intractable neurogenic claudication refractory to conservative measures. MRI scanning was positive for grade I spondylolisthesis and severe spinal stenosis at L3-4. He had very minimal anterolisthesis at L4-5 without stenosis. Therefore, this level was not felt to be symptomatic and was not considered in the surgical plan. He was taken to the operating room as conservative measures had failed. OPERATIVE NOTE:  The patient was brought to the operating room and was carefully placed under general endotracheal anesthesia without complications. Hubbard catheter, thigh pneumatic hose, LISSETT hose, and intraoperative EMG leads were placed, and SCDs were placed.   The patient was turned prone on the Cory frame on top of the OSI bed and the posterior aspect of the back was shaved and prepped in the usual sterile fashion. Incision was made from L2-5 in the midline. Muscles were stripped in the left lateral subperiosteal plane with cautery and elevators and deep retractors were placed. The transverse processes were denuded at L3 and L4 on the left. Fluoroscopy confirmed the correct localization and position, pointing at L3-4. Next, laminectomy and facetectomy were carried out with 3 and 4 mm Kerrison rongeurs at L3-4 on the left. Significant bony and ligamentous hypertrophy was removed. The dura was decompressed along with the L4 nerve root. The nerve root and dural sac were mobilized medially to expose a partially extruded disk at the disk space, which was incised with a 15 blade and confirmed by fluoroscopy to be the correct disk space. It was cleaned with pituitary rongeurs, both straight and upbiting. Next, 7, 8, and 9 side-biting curettes were used to clean the endplates and a good clean out was achieved. The L4 pedicle was entered with a straight awl, curved monitoring probe, and a ball-tip probe. No EMG changes were noted. Jamshidi needle was placed and 10 mL of bone marrow were aspirated. A 5.5 x 50 mm Orthofix screw was placed into the vertebral body through the pedicle without complication, and was confirmed by fluoroscopy and EMG monitoring to be in good position. The bone that had been removed was denuded of soft tissue and was cut into small pieces and morcellized, and mixed with OsteoAMP and bone marrow aspirate. Bone marrow aspirate, OsteoAMP, and autograft bone were then packed into the disk space with a bone impactor and a nice tight fill was achieved anteriorly. A 6.5 mm titanium Spine Wave cage, expandable, was countersunk into the disk space to a nice tight fit in the midline, and expanded until totally open to torque wrench tightness. This was confirmed by fluoroscopy to be in good position. The cage was backfilled with OsteoAMP and bone marrow aspirate. The L4 pedicle was then placed with a straight awl, curved monitoring probe, and a ball-tip probe. No EMG changes were noted. A 5.5 x 45 mm screw was placed under direct vision, confirmed by fluoroscopy to be in good position with no EMG changes noted. AP and lateral fluoroscopy confirmed good position of the graft and hardware. A 5 cm prebent omar was placed in the universal heads, locking caps were placed and tightened with a torque wrench screwdriver. The transverse processes were then copiously packed with autograft bone, bone marrow aspirate, and OsteoAMP. A nice lateral gutter of bone was achieved. A Francisco J-Husain drain was placed and brought out through a stab incision inferiorly and secured with 3-0 Vicryl suture. The wound was copiously irrigated until clear. No further bleeding was encountered. Surgiflo was placed. No further bleeding was noted and the wound was closed. The fascia was closed tightly with interrupted 0 Vicryl. Subcutaneous tissues were closed with interrupted 3-0 Vicryl. Skin was closed with a running locked 3-0 nylon suture and sterile dressings were placed. The drain was hooked to  bulb suction. The patient tolerated the procedure well, was turned supine, awakened, extubated, and taken to PACU in stable condition. There were no obvious complications.       MD YO Felix / MICH  D: 11/14/2018 10:20     T: 11/14/2018 10:38  JOB #: [id number]

## 2018-11-14 NOTE — PROGRESS NOTES
's Pre-op visit requested by patient. Conveyed care and concern for patient and family. Offered prayer as requested for patient, family, and staff. Dina Valente MDiv, BS Board Certified [employer]

## 2018-11-14 NOTE — BRIEF OP NOTE
BRIEF OPERATIVE NOTE Date of Procedure: 11/14/2018 Preoperative Diagnosis: Spondylolisthesis of lumbar region [M43.16] Postoperative Diagnosis: Spondylolisthesis of lumbar region [M43.16] Procedure(s): 
L3-4 SPINE TRANSFORAMINAL LUMBAR INTERBODY FUSION (TLIF) Surgeon(s) and Role: 
   * Gabino Sood MD - Primary Surgical Assistant: NONE Surgical Staff: 
Circ-1: Grecia Benito Circ-2: Jessica Garcia RN Radiology Technician: Negin Hein Scrub Tech-1: Nancy Reddy Scrub Private/Assistant: Deyanira Cueva 
Nurse Practitioner: Kaushik Gipson NP Event Time In Time Out Incision Start 0712 Incision Close Anesthesia: General  
Estimated Blood Loss: MINIMAL Specimens:  
ID Type Source Tests Collected by Time Destination 1 : L3-L4 DISK MATERIAL Preservative Bone  Gabino Sood MD 11/14/2018 0930 Pathology Findings: STENOSIS Complications: NONE Implants:  
Implant Name Type Inv. Item Serial No.  Lot No. LRB No. Used Action GRAFT BNE GRAN 5ML -- OSTEOAMP - [id number]  GRAFT BNE GRAN 5ML -- OSTEOAMP [id number] [employer]  N/A 1 Implanted SCR SPNE FIX MIDLNE [id number] --  - [id number]  SCR SPNE FIX MIDLNE [id number] --   ORTHOFIX SPINAL IMPLANTS [id number] N/A 1 Implanted

## 2018-11-15 VITALS
OXYGEN SATURATION: 91 % | DIASTOLIC BLOOD PRESSURE: 62 MMHG | BODY MASS INDEX: 31.53 KG/M2 | HEART RATE: 89 BPM | HEIGHT: 69 IN | TEMPERATURE: 99.9 F | WEIGHT: 212.9 LBS | RESPIRATION RATE: 18 BRPM | SYSTOLIC BLOOD PRESSURE: 102 MMHG

## 2018-11-15 PROCEDURE — 74011000250 HC RX REV CODE- 250: Performed by: NEUROLOGICAL SURGERY

## 2018-11-15 PROCEDURE — 97161 PT EVAL LOW COMPLEX 20 MIN: CPT

## 2018-11-15 PROCEDURE — 90686 IIV4 VACC NO PRSV 0.5 ML IM: CPT | Performed by: NEUROLOGICAL SURGERY

## 2018-11-15 PROCEDURE — 94760 N-INVAS EAR/PLS OXIMETRY 1: CPT

## 2018-11-15 PROCEDURE — 97530 THERAPEUTIC ACTIVITIES: CPT

## 2018-11-15 PROCEDURE — 90471 IMMUNIZATION ADMIN: CPT

## 2018-11-15 PROCEDURE — 74011250637 HC RX REV CODE- 250/637: Performed by: NEUROLOGICAL SURGERY

## 2018-11-15 PROCEDURE — 97165 OT EVAL LOW COMPLEX 30 MIN: CPT

## 2018-11-15 PROCEDURE — 74011250636 HC RX REV CODE- 250/636: Performed by: NEUROLOGICAL SURGERY

## 2018-11-15 PROCEDURE — 77030020255 HC SOL INJ LR 1000ML BG

## 2018-11-15 PROCEDURE — 77010033678 HC OXYGEN DAILY

## 2018-11-15 RX ORDER — CIPROFLOXACIN 500 MG/1
500 TABLET ORAL 2 TIMES DAILY
Qty: 10 TAB | Refills: 1 | Status: SHIPPED | OUTPATIENT
Start: 2018-11-15 | End: 2019-01-04

## 2018-11-15 RX ORDER — OXYCODONE AND ACETAMINOPHEN 7.5; 325 MG/1; MG/1
1 TABLET ORAL
Qty: 15 TAB | Refills: 0 | Status: SHIPPED | OUTPATIENT
Start: 2018-11-15 | End: 2019-07-05

## 2018-11-15 RX ADMIN — OXYCODONE HYDROCHLORIDE AND ACETAMINOPHEN 1 TABLET: 10; 325 TABLET ORAL at 05:33

## 2018-11-15 RX ADMIN — INFLUENZA VIRUS VACCINE 0.5 ML: 15; 15; 15; 15 SUSPENSION INTRAMUSCULAR at 09:21

## 2018-11-15 RX ADMIN — OXYCODONE HYDROCHLORIDE AND ACETAMINOPHEN 1 TABLET: 10; 325 TABLET ORAL at 14:56

## 2018-11-15 RX ADMIN — HYDROMORPHONE HYDROCHLORIDE 1 MG: 1 INJECTION, SOLUTION INTRAMUSCULAR; INTRAVENOUS; SUBCUTANEOUS at 11:11

## 2018-11-15 RX ADMIN — Medication 2 G: at 00:27

## 2018-11-15 RX ADMIN — CARVEDILOL 25 MG: 25 TABLET, FILM COATED ORAL at 08:40

## 2018-11-15 RX ADMIN — LISINOPRIL 20 MG: 20 TABLET ORAL at 08:40

## 2018-11-15 RX ADMIN — VENLAFAXINE HYDROCHLORIDE 150 MG: 150 CAPSULE, EXTENDED RELEASE ORAL at 08:40

## 2018-11-15 RX ADMIN — PROMETHAZINE HYDROCHLORIDE 12.5 MG: 25 INJECTION INTRAMUSCULAR; INTRAVENOUS at 09:17

## 2018-11-15 RX ADMIN — PANTOPRAZOLE SODIUM 40 MG: 40 TABLET, DELAYED RELEASE ORAL at 05:34

## 2018-11-15 RX ADMIN — OXYCODONE HYDROCHLORIDE AND ACETAMINOPHEN 1 TABLET: 10; 325 TABLET ORAL at 09:07

## 2018-11-15 RX ADMIN — AMLODIPINE BESYLATE 5 MG: 5 TABLET ORAL at 08:40

## 2018-11-15 RX ADMIN — Medication 2 G: at 16:00

## 2018-11-15 RX ADMIN — Medication 2 G: at 09:07

## 2018-11-15 RX ADMIN — ATORVASTATIN CALCIUM 20 MG: 10 TABLET, FILM COATED ORAL at 08:40

## 2018-11-15 RX ADMIN — Medication 1 AMPULE: at 08:03

## 2018-11-15 RX ADMIN — Medication 10 ML: at 05:34

## 2018-11-15 RX ADMIN — HYDROMORPHONE HYDROCHLORIDE 1 MG: 1 INJECTION, SOLUTION INTRAMUSCULAR; INTRAVENOUS; SUBCUTANEOUS at 02:08

## 2018-11-15 NOTE — PROGRESS NOTES
Problem: Mobility Impaired (Adult and Pediatric) Goal: *Acute Goals and Plan of Care (Insert Text) Discharge Goals: 
(1.)Mr. Christopher Corbin will move from supine to sit and sit to supine , scoot up and down and roll side to side with MODIFIED INDEPENDENCE within 2 treatment day(s). (2.)Mr. Christopher Corbin will transfer from bed to chair and chair to bed with INDEPENDENT using the least restrictive device within 2 treatment day(s). (3.)Mr. Perla will ambulate with INDEPENDENT for 500 feet with the least restrictive device within 2 treatment day(s). (4.)Mr. Perla will perform standing static and dynamic balance activities x 15 minutes with INDEPENDENT to improve safety within 2 day(s). (5.)Mr. Christopher Corbin will maintain spinal precautions throughout all functional mobility within 2 days with 0 verbal cues. (6.)Mr. Perla will ascend and descend 2 stairs using 0-1 hand rail(s) with MODIFIED INDEPENDENCE to improve functional mobility and safety within 2 day(s). ________________________________________________________________________________________________ PHYSICAL THERAPY: Daily Note, Treatment Day: Day of Assessment, PM 11/15/2018INPATIENT: Hospital Day: 2 Payor: [address] [place] [address] / Plan: [facility] STATE / Product Type: PPO /  
  
NAME/AGE/GENDER: Betty Gregg is a 52 y.o. male PRIMARY DIAGNOSIS: Spondylolisthesis of lumbar region [M43.16] Spondylolisthesis at L3-L4 level Spondylolisthesis at L3-L4 level Procedure(s) (LRB): 
L3-4 SPINE TRANSFORAMINAL LUMBAR INTERBODY FUSION (TLIF) (N/A) 1 Day Post-Op ICD-10: Treatment Diagnosis: · Difficulty in walking, Not elsewhere classified (R26.2) Precaution/Allergies: 
Patient has no known allergies. ASSESSMENT:  
Mr. Christopher Corbin is a 52 y.o. male admitted s/p above surgery. He is sitting in [address] [place] [address] chair on contact and agreeable to physical therapy evaluation and treatment.  He lives with spouse in a single story home with 2 steps to enter and typically ambulates and performs ADLs independently. 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 He reports significant pain, however wants to try to ambulate. C/o LLE \"feeling weird,\" and exhibits 4+/5 strength BLE and intact sensation to light touch L2-S1. He stood with SBA and ambulated within room and hallway 100' with no assistive device and CGA. Treatment initiated to include stair climbing and continued ambulation with SBA and verbal cues for posture and safety during stair climbing and ambulation. He fatigued quickly and returned to room, CGA and cues required for slow decent to chair. Tiffani Clay is currently functioning below his baseline and would benefit from skilled PT during acute care stay to maximize safety and independence with functional mobility. PM Note: patient supine in bed with wife present and agreeable to physical therapy treatment. He performed log roll with no cues, stood with SBA and increased ambulation distance to 500' with stand by assist and no assistive device. Noted increased trunk sway during ambulation as well as path deviations, but no losses of balance. Returned to supine with supervision and no cues required. He is progressing towards bed mobility and ambulation goals. Will continue physical therapy efforts. This section established at most recent assessment PROBLEM LIST (Impairments causing functional limitations): 1. Decreased Strength 2. Decreased ADL/Functional Activities 3. Decreased Transfer Abilities 4. Decreased Ambulation Ability/Technique 5. Decreased Balance 6. Increased Pain 7. Decreased Activity Tolerance 8. Decreased Knowledge of Precautions 9. Decreased [place] with Home Exercise Program 
 INTERVENTIONS PLANNED: (Benefits and precautions of physical therapy have been discussed with the patient.) 1. Balance Exercise 2. Bed Mobility 3. Family Education 4. Gait Training 5. Group Therapy 6. Home Exercise Program (HEP) 7. Therapeutic Activites 8. Therapeutic Exercise/Strengthening 9. Transfer Training 10. Patient Education TREATMENT PLAN: Frequency/Duration: twice daily for duration of hospital stay Rehabilitation Potential For Stated Goals: Excellent RECOMMENDED REHABILITATION/EQUIPMENT: (at time of discharge pending progress): Due to the probability of continued deficits (see above) this patient will not likely need continued skilled physical therapy after discharge. Equipment:  
? None at this time HISTORY:  
History of Present Injury/Illness (Reason for Referral): A 68-year-old gentleman with a greater than 3-year history of neurogenic claudication, right greater than left, refractory to conservative measures. Aggressive conservative measures have been attempted. MRI scanning was positive for spinal stenosis and spondylolisthesis at L3-4 with a large right-sided disk protrusion centrally causing compression. Past Medical History/Comorbidities:  
Mr. Vibha Vargas  has a past medical history of CAD (coronary artery disease), Chronic pain, Depressive disorder, not elsewhere classified, Essential hypertension, benign, GERD (gastroesophageal reflux disease), Obese, Pure hypercholesterolemia, and Sleep apnea. Mr. Vibha Vargas  has a past surgical history that includes pr colonoscopy flx dx w/collj spec when pfrmd (8/17/2018); pr abdomen surgery proc unlisted (08/2018); hx knee arthroscopy (Right, YRS AGO); pr cardiac surg procedure unlist (2014); pr cardiac surg procedure unlist (2015); and COLONOSCOPY/ 32 (N/A, 8/17/2018). Social History/Living Environment:  
Home Environment: Private residence # Steps to Enter: 2 One/Two Story Residence: One story Living Alone: No 
Support Systems: Child(beatriz), Spouse/Significant Other/Partner Patient Expects to be Discharged to[de-identified] Private residence Current DME Used/Available at Home: None Prior Level of Function/Work/Activity: He is sitting in [address] [place] [address] Connector chair on contact and agreeable to physical therapy evaluation and treatment. He lives with spouse in a single story home with 2 steps to enter and typically ambulates and performs ADLs independently. 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 Number of Personal Factors/Comorbidities that affect the Plan of Care: 1-2: MODERATE COMPLEXITY EXAMINATION:  
Most Recent Physical Functioning:  
Gross Assessment: 
AROM: Generally decreased, functional 
PROM: Within functional limits Strength: Generally decreased, functional 
Coordination: Within functional limits Tone: Normal 
Sensation: Intact Posture: 
Posture (WDL): Exceptions to [facility] Posture Assessment: Forward head, Rounded shoulders Balance: 
Sitting: Intact Standing: Impaired Standing - Static: Good Standing - Dynamic : Fair Bed Mobility: 
Rolling: Supervision Supine to Sit: Supervision Sit to Supine: Supervision Scooting: Supervision Wheelchair Mobility: 
  
Transfers: 
Sit to Stand: Stand-by assistance Stand to Sit: Stand-by assistance Interventions: Safety awareness training;Verbal cues; Visual cues Gait: 
  
Base of Support: Center of gravity altered;Narrowed Gait Abnormalities: Decreased step clearance Distance (ft): 500 Feet (ft) Interventions: Manual cues; Safety awareness training;Verbal cues Body Structures Involved: 1. Nerves 2. Bones 3. Joints 4. Muscles 5. Ligaments Body Functions Affected: 1. Sensory/Pain 2. Neuromusculoskeletal 
3. Movement Related Activities and Participation Affected: 1. Mobility 2. Self Care 3. Domestic Life 4. Interpersonal Interactions and Relationships 5. Community, Social and Sabine Freeland Number of elements that affect the Plan of Care: 4+: HIGH COMPLEXITY CLINICAL PRESENTATION:  
Presentation: Stable and uncomplicated: LOW COMPLEXITY CLINICAL DECISION MAKING:  
[employer] AM-PAC 6 Clicks Basic Mobility Inpatient Short Form How much difficulty does the patient currently have. .. Unable A Lot A Little None 1. Turning over in bed (including adjusting bedclothes, sheets and blankets)? [] 1   [] 2   [x] 3   [] 4  
2. Sitting down on and standing up from a chair with arms ( e.g., wheelchair, bedside commode, etc.)   [] 1   [] 2   [x] 3   [] 4  
3. Moving from lying on back to sitting on the side of the bed? [] 1   [] 2   [x] 3   [] 4 How much help from another person does the patient currently need. .. Total A Lot A Little None 4. Moving to and from a bed to a chair (including a wheelchair)? [] 1   [] 2   [x] 3   [] 4  
5. Need to walk in hospital room? [] 1   [] 2   [x] 3   [] 4  
6. Climbing 3-5 steps with a railing? [] 1   [] 2   [x] 3   [] 4  
© 2007, Trustees of [facility], under license to [employer]. All rights reserved Score:  Initial: 18 Most Recent: X (Date: -- ) Interpretation of Tool:  Represents activities that are increasingly more difficult (i.e. Bed mobility, Transfers, Gait). Score 24 23 22-20 19-15 14-10 9-7 6 Modifier CH CI CJ CK CL CM CN   
 
? Mobility - Walking and Moving Around:  
  - CURRENT STATUS: CK - 40%-59% impaired, limited or restricted  - GOAL STATUS: CJ - 20%-39% impaired, limited or restricted  - D/C STATUS:  ---------------To be determined--------------- Payor: [address] [place] [address] / Plan: [place] [employer] STATE / Product Type: PPO /   
 
Medical Necessity:    
· Patient demonstrates excellent rehab potential due to higher previous functional level. Reason for Services/Other Comments: 
· Patient continues to require modification of therapeutic interventions to increase complexity of exercises. Use of outcome tool(s) and clinical judgement create a POC that gives a: Clear prediction of patient's progress: LOW COMPLEXITY  
  
 
 
 
TREATMENT:  
(In addition to Assessment/Re-Assessment sessions the following treatments were rendered) Pre-treatment Symptoms/Complaints:  Back pain, still wants to try ambulating. Pain: Initial:  
Pain Intensity 1: 7 Pain Location 1: Back Pain Orientation 1: Lower Pain Intervention(s) 1: Ambulation/Increased Activity  Post Session:  7.5/10, RN to provide medications. Therapeutic Activity: (    10 minutes): Therapeutic activities including bed transfers, Chair transfers, Ambulation on level ground to improve mobility, strength and balance. Required minimal Manual cues; Safety awareness training;Verbal cues to promote static and dynamic balance in standing. Braces/Orthotics/Lines/Etc:  
· O2 Room Air Treatment/Session Assessment:   
· Response to Treatment:  Patient experienced nausea and vomiting with mobility this AM. · Interdisciplinary Collaboration:  
o Physical Therapist 
o Registered Nurse · After treatment position/precautions:  
o Supine in bed 
o Bed/Chair-wheels locked 
o Bed in low position 
o Call light within reach 
o RN notified 
o Family at bedside · Compliance with Program/Exercises: Will assess as treatment progresses · Recommendations/Intent for next treatment session: \"Next visit will focus on advancements to more challenging activities and reduction in assistance provided\". Total Treatment Duration: PT Patient Time In/Time Out Time In: 1750 Time Out: 1324 Cecelia Moore DPT

## 2018-11-15 NOTE — PROGRESS NOTES
Discharge instructions  all new medications,medication side effects sheet, follow up appointment and  prescriptions reviewed and explained to the patient. Patient instructed on how to care for LISA drain at home and written information provided Patient verbalizes understanding of instructions. A copy of discharge instructions and prescriptions  have been given to patient. Opportunity for questions provided. Patient to be discharged after IV Ancef which is due at 1600 is  completed

## 2018-11-15 NOTE — PROGRESS NOTES
Problem: Mobility Impaired (Adult and Pediatric) Goal: *Acute Goals and Plan of Care (Insert Text) Discharge Goals: 
(1.)Mr. Alonzo Marie will move from supine to sit and sit to supine , scoot up and down and roll side to side with MODIFIED INDEPENDENCE within 2 treatment day(s). (2.)Mr. Alonzo Marie will transfer from bed to chair and chair to bed with INDEPENDENT using the least restrictive device within 2 treatment day(s). (3.)Mr. Perla will ambulate with INDEPENDENT for 500 feet with the least restrictive device within 2 treatment day(s). (4.)Mr. Perla will perform standing static and dynamic balance activities x 15 minutes with INDEPENDENT to improve safety within 2 day(s). (5.)Mr. Alonzo Marie will maintain spinal precautions throughout all functional mobility within 2 days with 0 verbal cues. (6.)Mr. Perla will ascend and descend 2 stairs using 0-1 hand rail(s) with MODIFIED INDEPENDENCE to improve functional mobility and safety within 2 day(s). ________________________________________________________________________________________________ PHYSICAL THERAPY: Initial Assessment, Daily Note, AM 11/15/2018INPATIENT: Hospital Day: 2 Payor: [address] [place] [address] / Plan: [facility] STATE / Product Type: PPO /  
  
NAME/AGE/GENDER: Dee Dee Baker is a 52 y.o. male PRIMARY DIAGNOSIS: Spondylolisthesis of lumbar region [M43.16] Spondylolisthesis at L3-L4 level Spondylolisthesis at L3-L4 level Procedure(s) (LRB): 
L3-4 SPINE TRANSFORAMINAL LUMBAR INTERBODY FUSION (TLIF) (N/A) 1 Day Post-Op ICD-10: Treatment Diagnosis: · Difficulty in walking, Not elsewhere classified (R26.2) Precaution/Allergies: 
Patient has no known allergies. ASSESSMENT:  
Mr. Alonzo Marie is a 52 y.o. male admitted s/p above surgery. He is sitting in [address] [place] [address] chair on contact and agreeable to physical therapy evaluation and treatment.  He lives with spouse in a single story home with 2 steps to enter and typically ambulates and performs ADLs independently. 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 He reports significant pain, however wants to try to ambulate. C/o LLE \"feeling weird,\" and exhibits 4+/5 strength BLE and intact sensation to light touch L2-S1. He stood with SBA and ambulated within room and hallway 100' with no assistive device and CGA. Treatment initiated to include stair climbing and continued ambulation with SBA and verbal cues for posture and safety during stair climbing and ambulation. He fatigued quickly and returned to room, CGA and cues required for slow decent to chair. Shayna Zavaleta is currently functioning below his baseline and would benefit from skilled PT during acute care stay to maximize safety and independence with functional mobility. This section established at most recent assessment PROBLEM LIST (Impairments causing functional limitations): 1. Decreased Strength 2. Decreased ADL/Functional Activities 3. Decreased Transfer Abilities 4. Decreased Ambulation Ability/Technique 5. Decreased Balance 6. Increased Pain 7. Decreased Activity Tolerance 8. Decreased Knowledge of Precautions 9. Decreased [place] with Home Exercise Program 
 INTERVENTIONS PLANNED: (Benefits and precautions of physical therapy have been discussed with the patient.) 1. Balance Exercise 2. Bed Mobility 3. Family Education 4. Gait Training 5. Group Therapy 6. Home Exercise Program (HEP) 7. Therapeutic Activites 8. Therapeutic Exercise/Strengthening 9. Transfer Training 10. Patient Education TREATMENT PLAN: Frequency/Duration: twice daily for duration of hospital stay Rehabilitation Potential For Stated Goals: Excellent RECOMMENDED REHABILITATION/EQUIPMENT: (at time of discharge pending progress): Due to the probability of continued deficits (see above) this patient will not likely need continued skilled physical therapy after discharge. Equipment: ? None at this time HISTORY:  
History of Present Injury/Illness (Reason for Referral): A 52-year-old gentleman with a greater than 3-year history of neurogenic claudication, right greater than left, refractory to conservative measures. Aggressive conservative measures have been attempted. MRI scanning was positive for spinal stenosis and spondylolisthesis at L3-4 with a large right-sided disk protrusion centrally causing compression. Past Medical History/Comorbidities:  
Mr. Gary Zhang  has a past medical history of CAD (coronary artery disease), Chronic pain, Depressive disorder, not elsewhere classified, Essential hypertension, benign, GERD (gastroesophageal reflux disease), Obese, Pure hypercholesterolemia, and Sleep apnea. Mr. Gary Zhang  has a past surgical history that includes pr colonoscopy flx dx w/collj spec when pfrmd (8/17/2018); pr abdomen surgery proc unlisted (08/2018); hx knee arthroscopy (Right, YRS AGO); pr cardiac surg procedure unlist (2014); pr cardiac surg procedure unlist (2015); and COLONOSCOPY/ 32 (N/A, 8/17/2018). Social History/Living Environment:  
Home Environment: Private residence # Steps to Enter: 2 One/Two Story Residence: One story Living Alone: No 
Support Systems: Child(beatriz), Spouse/Significant Other/Partner Patient Expects to be Discharged to[de-identified] Private residence Current DME Used/Available at Home: None Prior Level of Function/Work/Activity: 
He is sitting in hardback chair on contact and agreeable to physical therapy evaluation and treatment. He lives with spouse in a single story home with 2 steps to enter and typically ambulates and performs ADLs independently. 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 Number of Personal Factors/Comorbidities that affect the Plan of Care: 1-2: MODERATE COMPLEXITY EXAMINATION:  
Most Recent Physical Functioning:  
Gross Assessment: 
AROM: Generally decreased, functional 
PROM: Within functional limits Strength: Generally decreased, functional 
Coordination: Within functional limits Tone: Normal 
Sensation: Intact Posture: 
Posture (WDL): Exceptions to [facility] Posture Assessment: Forward head, Rounded shoulders Balance: 
Sitting: Intact Standing: Impaired Standing - Static: Good Standing - Dynamic : Fair Bed Mobility: 
  
Wheelchair Mobility: 
  
Transfers: 
Sit to Stand: Stand-by assistance Stand to Sit: Stand-by assistance Gait: 
  
Base of Support: Center of gravity altered;Narrowed Gait Abnormalities: Decreased step clearance Distance (ft): 250 Feet (ft) Interventions: Manual cues; Safety awareness training;Verbal cues Body Structures Involved: 1. Nerves 2. Bones 3. Joints 4. Muscles 5. Ligaments Body Functions Affected: 1. Sensory/Pain 2. Neuromusculoskeletal 
3. Movement Related Activities and Participation Affected: 1. Mobility 2. Self Care 3. Domestic Life 4. Interpersonal Interactions and Relationships 5. Community, Social and [place] Number of elements that affect the Plan of Care: 4+: HIGH COMPLEXITY CLINICAL PRESENTATION:  
Presentation: Stable and uncomplicated: LOW COMPLEXITY CLINICAL DECISION MAKING:  
M [employer] -PAC 6 Clicks Basic Mobility Inpatient Short Form How much difficulty does the patient currently have. .. Unable A Lot A Little None 1. Turning over in bed (including adjusting bedclothes, sheets and blankets)? [] 1   [] 2   [x] 3   [] 4  
2. Sitting down on and standing up from a chair with arms ( e.g., wheelchair, bedside commode, etc.)   [] 1   [] 2   [x] 3   [] 4  
3. Moving from lying on back to sitting on the side of the bed? [] 1   [] 2   [x] 3   [] 4 How much help from another person does the patient currently need. .. Total A Lot A Little None 4. Moving to and from a bed to a chair (including a wheelchair)? [] 1   [] 2   [x] 3   [] 4  
5. Need to walk in hospital room?    [] 1   [] 2   [x] 3   [] 4  
 6.  Climbing 3-5 steps with a railing? [] 1   [] 2   [x] 3   [] 4  
© 2007, Trustees of [facility] [employer], under license to [employer]. All rights reserved Score:  Initial: 18 Most Recent: X (Date: -- ) Interpretation of Tool:  Represents activities that are increasingly more difficult (i.e. Bed mobility, Transfers, Gait). Score 24 23 22-20 19-15 14-10 9-7 6 Modifier CH CI CJ CK CL CM CN   
 
? Mobility - Walking and Moving Around:  
  - CURRENT STATUS: CK - 40%-59% impaired, limited or restricted  - GOAL STATUS: CJ - 20%-39% impaired, limited or restricted  - D/C STATUS:  ---------------To be determined--------------- Payor: [facility] / Plan: [place] [employer] STATE / Product Type: PPO /   
 
Medical Necessity:    
· Patient demonstrates excellent rehab potential due to higher previous functional level. Reason for Services/Other Comments: 
· Patient continues to require modification of therapeutic interventions to increase complexity of exercises. Use of outcome tool(s) and clinical judgement create a POC that gives a: Clear prediction of patient's progress: LOW COMPLEXITY  
  
 
 
 
TREATMENT:  
(In addition to Assessment/Re-Assessment sessions the following treatments were rendered) Pre-treatment Symptoms/Complaints:  Back pain, still wants to try ambulating. Pain: Initial:  
Pain Intensity 1: 9 Pain Location 1: Back Pain Orientation 1: Lower Pain Intervention(s) 1: Ambulation/Increased Activity  Post Session:  9/10, RN to provide medications. Therapeutic Activity: (    8 minutes): Therapeutic activities including Chair transfers, Ambulation on level ground and Stairs to improve mobility, strength and balance. Required minimal Manual cues; Safety awareness training;Verbal cues to promote static and dynamic balance in standing. Braces/Orthotics/Lines/Etc:  
· O2 Room Air Treatment/Session Assessment: · Response to Treatment:  Patient experienced nausea and vomiting with mobility this AM. · Interdisciplinary Collaboration:  
o Physical Therapist 
o Occupational Therapist 
o Registered Nurse · After treatment position/precautions:  
o Up in chair 
o Bed/Chair-wheels locked 
o Bed in low position 
o Call light within reach 
o RN notified 
o Nurse at bedside · Compliance with Program/Exercises: Will assess as treatment progresses · Recommendations/Intent for next treatment session: \"Next visit will focus on advancements to more challenging activities and reduction in assistance provided\". Total Treatment Duration: PT Patient Time In/Time Out Time In: 4812 Time Out: 0812 Anurag Hough DPT

## 2018-11-15 NOTE — PROGRESS NOTES
NS   POD#1 AFEBRILE DOING WELL 
5/5POWER 
LISA  180 ML 
MILD LLE NUMBNESS 
A/P PT/OT 
TD HOME LATER WITH  LISA  DRAIN 
PULL LISA  ON  Monday Darrian Del Real MD

## 2018-11-15 NOTE — DISCHARGE INSTRUCTIONS
Sponge bathe prior to drain removal    MAY shower after drain is removed (may shower with dressing on)-->NO tub baths    LEAVE dressing clean and dry until drain is removed    NO lifting anything heavier than 5LBS     WEAR your brace if prescribed at all times EXCEPT when in bed, just going to the bathroom, or showering    NO Bending, Lifting or Twisting    Avoid sitting more than 20 - 30 minutes at a time    NO driving until directed by your doctor    DO NOT take any NSAIDS (either prescribed or over the counter until directed    (Aleve, Ibuprofen, Mobic, etc) as this will interfere with bone healing    CALL Dr. Jonathon Landau if:  Fever >100.5  ([phone])               Incision becomes red/ swollen/ opens up             Incision has yellow, thick drainage or an odor             Pain is not managed with prescribed medications             Excessive nausea and/or vomiting    Avoid having pets sleep in bed with you until incision is completely healed         Surgical Drain Care: Care Instructions  Your Care Instructions    After a surgery, fluid may collect inside your body in the surgical area. This makes an infection or other problems more likely. A surgical drain allows the fluid to flow out. The doctor puts a thin, flexible rubber tube into the area of your body where the fluid is likely to collect. The rubber tube carries the fluid outside your body. The most common type of surgical drain carries the fluid into a collection bulb that you empty. This is called a Francisco J-Husain (LISA) drain. The drain uses suction created by the bulb to pull the fluid from your body into the bulb. The rubber tube will probably be held in place by one or two stitches in your skin. The bulb will probably be attached with a safety pin to your clothes or near the bandage so that it doesn't flip around or pull on the stitches. Another type of drain is called a Penrose drain. This type of drain doesn't have a bulb.  Instead, the end of the tube is open. That allows the fluid to drain onto a dressing taped to your skin. The drain may be kept in place next to your skin with a stitch or a safety pin in the tube. When you first get the drain, the fluid will be bloody. It will change color from red to pink to a light yellow or clear as the wound heals and the fluid starts to go away. Your doctor may give you information on when you no longer need the drain and when it will be removed. Follow-up care is a key part of your treatment and safety. Be sure to make and go to all appointments, and call your doctor if you are having problems. It's also a good idea to know your test results and keep a list of the medicines you take. How can you care for yourself at home? How to empty the bulb of a Francisco J-Husain drain  Follow any instructions your doctor gives you. How often you empty the bulb depends on how much fluid is draining. Empty the bulb when it is half full. 1. Wash your hands with soap and water. 2. Take the plug out of the bulb. 3. Empty the bulb. If your doctor asks you to measure the fluid, empty the fluid into a measuring cup, and write down the color and how much you collected. Your doctor will want to know this information. 4. Clean the plug with alcohol. 5. Squeeze the bulb until it is flat. This removes all the air from the bulb. You may need to put the bulb on a table or a counter to flatten it. 6. Keep the bulb flat, and put the plug in. The bulb should stay flat after you put the plug back in. This creates the suction that pulls the fluid into the bulb. 7. Empty the fluid into the toilet. 8. Wash your hands. How to change the dressing around your surgical drain  You may have a dressing (bandage). The dressing is often made of gauze pads held on with tape. Your doctor will tell you how often to change it. 1. Wash your hands with soap and water. 2. Take off the dressing from around the drain.   3. Clean the drain site and the skin around it with soap and water. Use gauze or a cotton swab. 4. When the site is dry, put on a new dressing. The way your dressing is put on depends on what kind of drain you have. You will get instructions for your type of drain. 5. Wash your hands again with soap and water. Your doctor may ask you to keep track of your dressing changes. Write down the time of day and the amount and color of the fluid on the dressing. How to help prevent clogs in your surgical drain  Squeezing or \"milking\" the tube of your surgical drain can help prevent clogs so that it drains correctly. Your doctor will tell you when you need to do this. In general, you do this when:  · You see a clot in the tube that prevents fluid from draining. The clot may look like a dark, stringy lining. · You see fluid leaking around the tube where it goes into the skin. Follow these steps for milking the tube. 1. Use one hand to hold and pinch the tube where it leaves the skin. 2. With the thumb and first finger of your other hand, pinch the tube just below where you're holding it. 3. Slowly and firmly push your thumb and first finger down the tubing toward the end of the tube. 4. Repeat this as many times as needed to move the clot. If you have a Rfancisco J-Husain (LISA) drain, the clot should move down the tube and into the bulb. If you have a Penrose drain, the clot should move into the dressing. When should you call for help? Call your doctor now or seek immediate medical care if:    · You have signs of infection, such as:  ? Increased pain, swelling, warmth, or redness around the area. ? Red streaks leading from the area. ? Pus draining from the area.   ? A fever.     · You see a sudden change in the color or smell of the drainage.     · The tube is coming loose where it leaves your skin.    Watch closely for changes in your health, and be sure to contact your doctor if:    · You see a lot of fluid around the drain.     · You cannot remove a clot from the tube by milking the tube. Where can you learn more? Go to http://denver-diaz.info/. Enter K117 in the search box to learn more about \"Surgical Drain Care: Care Instructions. \"  Current as of: January 10, 2018  Content Version: 11.8  © 1693-5956 [employer]. Care instructions adapted under license by [employer] (which disclaims liability or warranty for this information). If you have questions about a medical condition or this instruction, always ask your healthcare professional. [address] any warranty or liability for your use of this information. DISCHARGE SUMMARY from Nurse    PATIENT INSTRUCTIONS:    After general anesthesia or intravenous sedation, for 24 hours or while taking prescription Narcotics:  · Limit your activities  · Do not drive and operate hazardous machinery  · Do not make important personal or business decisions  · Do  not drink alcoholic beverages  · If you have not urinated within 8 hours after discharge, please contact your surgeon on call. Report the following to your surgeon:  · Excessive pain, swelling, redness or odor of or around the surgical area  · Temperature over 100.5  · Nausea and vomiting lasting longer than 4 hours or if unable to take medications  · Any signs of decreased circulation or nerve impairment to extremity: change in color, persistent  numbness, tingling, coldness or increase pain  · Any questions    What to do at Home:  Recommended activity: see discharge instructions  If you experience any of the following symptoms see discharge instructions, please follow up with surgeon. *  Please give a list of your current medications to your Primary Care Provider. *  Please update this list whenever your medications are discontinued, doses are      changed, or new medications (including over-the-counter products) are added.     *  Please carry medication information at all times in case of emergency situations. These are general instructions for a healthy lifestyle:    No smoking/ No tobacco products/ Avoid exposure to second hand smoke  Surgeon General's Warning:  Quitting smoking now greatly reduces serious risk to your health. Obesity, smoking, and sedentary lifestyle greatly increases your risk for illness    A healthy diet, regular physical exercise & weight monitoring are important for maintaining a healthy lifestyle    You may be retaining fluid if you have a history of heart failure or if you experience any of the following symptoms:  Weight gain of 3 pounds or more overnight or 5 pounds in a week, increased swelling in our hands or feet or shortness of breath while lying flat in bed. Please call your doctor as soon as you notice any of these symptoms; do not wait until your next office visit. Recognize signs and symptoms of STROKE:    F-face looks uneven    A-arms unable to move or move unevenly    S-speech slurred or non-existent    T-time-call 911 as soon as signs and symptoms begin-DO NOT go       Back to bed or wait to see if you get better-TIME IS BRAIN. Warning Signs of HEART ATTACK     Call 911 if you have these symptoms:   Chest discomfort. Most heart attacks involve discomfort in the center of the chest that lasts more than a few minutes, or that goes away and comes back. It can feel like uncomfortable pressure, squeezing, fullness, or pain.  Discomfort in other areas of the upper body. Symptoms can include pain or discomfort in one or both arms, the back, neck, jaw, or stomach.  Shortness of breath with or without chest discomfort.  Other signs may include breaking out in a cold sweat, nausea, or lightheadedness. Don't wait more than five minutes to call 911 - MINUTES MATTER! Fast action can save your life. Calling 911 is almost always the fastest way to get lifesaving treatment.  Emergency Medical Services staff can begin treatment when they arrive -- up to an hour sooner than if someone gets to the hospital by car. The discharge information has been reviewed with the patient. The patient verbalized understanding. Discharge medications reviewed with the patient and appropriate educational materials and side effects teaching were provided.   ___________________________________________________________________________________________________________________________________

## 2018-11-15 NOTE — PROGRESS NOTES
OCCUPATIONAL THERAPY: Initial Assessment and Discharge 11/15/2018INPATIENT: Hospital Day: 2 Payor: [id number] / Plan: [employer] [place] / Product Type: PPO /  
  
NAME/AGE/GENDER: Bryant Rivers is a 52 y.o. male PRIMARY DIAGNOSIS:  Spondylolisthesis of lumbar region [M43.16] Spondylolisthesis at L3-L4 level Spondylolisthesis at L3-L4 level Procedure(s) (LRB): 
L3-4 SPINE TRANSFORAMINAL LUMBAR INTERBODY FUSION (TLIF) (N/A) 1 Day Post-Op ICD-10: Treatment Diagnosis:  
 · Generalized Muscle Weakness (M62.81) Precautions/Allergies: 
   Patient has no known allergies. ASSESSMENT:  
Mr. Urmila Hughes presents for the above. Upon arrival, pt supine in bed with RN in room. Pt alert, oriented x 4, and c/o 9/10. Pt agreeable to OT evaluation. Pt reports living with wife and son in a 1-story home with 2 steps to enter. Pt reports independence with ADLs and functional mobility. Pt was also still working and driving prior to hospitalization. Today, pt educated in spinal precautions, including no bending, lifting, and twisting, as well as use of log roll to complete bed mobility. Pt able to perform log roll and supine to sit transfer with SBA. Pt's static sitting balance is intact. Pt's BUE AROM, strength, coordination, and sensation are WFL. Pt performed sit to stand transfer and ambulated to bedside chair with SBA. Pt encouraged to sit up for 30 minutes. Pt left sitting up in chair, with needs met, call light within reach, and RN notified. Pt currently has discharge orders home and vocalizes no OT needs at this time. At this time, pt is functioning close to baseline for ADLs; will defer to PT for functional mobility. Pt to be discharged from OT services at this time. This section established at most recent assessment PROBLEM LIST (Impairments causing functional limitations): 1. Decreased Transfer Abilities 2. Decreased Ambulation Ability/Technique 3. Decreased Balance 4. Increased Pain INTERVENTIONS PLANNED: (Benefits and precautions of occupational therapy have been discussed with the patient.) 1. None TREATMENT PLAN: Frequency/Duration: Rehabilitation Potential For Stated Goals:  
 
RECOMMENDED REHABILITATION/EQUIPMENT: (at time of discharge pending progress): Due to the probability of continued deficits (see above) this patient will not likely need continued skilled occupational therapy after discharge. Equipment:  
? None at this time OCCUPATIONAL PROFILE AND HISTORY:  
History of Present Injury/Illness (Reason for Referral): 
See H&P. Past Medical History/Comorbidities:  
Mr. Zoraida Neri  has a past medical history of CAD (coronary artery disease), Chronic pain, Depressive disorder, not elsewhere classified, Essential hypertension, benign, GERD (gastroesophageal reflux disease), Obese, Pure hypercholesterolemia, and Sleep apnea. Mr. Zoraida Neri  has a past surgical history that includes pr colonoscopy flx dx w/collj spec when pfrmd (8/17/2018); pr abdomen surgery proc unlisted (08/2018); hx knee arthroscopy (Right, YRS AGO); pr cardiac surg procedure unlist (2014); pr cardiac surg procedure unlist (2015); and COLONOSCOPY/ 32 (N/A, 8/17/2018). Social History/Living Environment:  
Home Environment: Private residence # Steps to Enter: 2 One/Two Story Residence: One story Living Alone: No 
Support Systems: Child(beatriz), Spouse/Significant Other/Partner Patient Expects to be Discharged to[de-identified] Private residence Current DME Used/Available at Home: None Prior Level of Function/Work/Activity: 
Independent with ADLs and functional mobility. Personal Factors:   
      Sex:  male Age:  52 y.o. Other factors that influence how disability is experienced by the patient:  Multiple co-morbidities Number of Personal Factors/Comorbidities that affect the Plan of Care: Brief history (0):  LOW COMPLEXITY ASSESSMENT OF OCCUPATIONAL PERFORMANCE[de-identified]  
Activities of Daily Living: Basic ADLs (From Assessment) Complex ADLs (From Assessment) Feeding: Independent Oral Facial Hygiene/Grooming: Independent Bathing: Moderate assistance Upper Body Dressing: Independent Lower Body Dressing: Moderate assistance Toileting: Minimum assistance Instrumental ADL Meal Preparation: Maximum assistance Homemaking: Maximum assistance Grooming/Bathing/Dressing Activities of Daily Living Cognitive Retraining Safety/Judgement: Awareness of environment Bed/Mat Mobility Rolling: Stand-by assistance Supine to Sit: Stand-by assistance Sit to Stand: Stand-by assistance Scooting: Stand-by assistance Most Recent Physical Functioning:  
Gross Assessment: 
AROM: Generally decreased, functional 
PROM: Within functional limits Strength: Generally decreased, functional 
Coordination: Within functional limits Tone: Normal 
Sensation: Intact Posture: 
  
Balance: 
Sitting: Intact Bed Mobility: 
Rolling: Stand-by assistance Supine to Sit: Stand-by assistance Scooting: Stand-by assistance Wheelchair Mobility: 
  
Transfers: 
Sit to Stand: Stand-by assistance Stand to Sit: Stand-by assistance Patient Vitals for the past 6 hrs: 
 BP BP Patient Position SpO2 Pulse 11/15/18 0321 105/62 At rest 92 % 89  
11/15/18 0832 153/80 Sitting 93 % 84 Mental Status Neurologic State: Alert Orientation Level: Oriented X4 Cognition: Follows commands Perception: Appears intact Perseveration: No perseveration noted Safety/Judgement: Awareness of environment Physical Skills Involved: 
1. Balance 2. Strength 3. Activity Tolerance 4. Pain (Chronic) Cognitive Skills Affected (resulting in the inability to perform in a timely and safe manner): 1. none Psychosocial Skills Affected: 1. Habits/Routines 2. Environmental Adaptation Number of elements that affect the Plan of Care: 5+:  HIGH COMPLEXITY CLINICAL DECISION MAKING:  
 Bautista Daily Activity Inpatient Short Form How much help from another person does the patient currently need. .. Total A Lot A Little None 1. Putting on and taking off regular lower body clothing? [] 1   [x] 2   [] 3   [] 4  
2. Bathing (including washing, rinsing, drying)? [] 1   [x] 2   [] 3   [] 4  
3. Toileting, which includes using toilet, bedpan or urinal?   [] 1   [] 2   [x] 3   [] 4  
4. Putting on and taking off regular upper body clothing? [] 1   [] 2   [] 3   [x] 4  
5. Taking care of personal grooming such as brushing teeth? [] 1   [] 2   [] 3   [x] 4  
6. Eating meals? [] 1   [] 2   [] 3   [x] 4  
© 2007, Trustees of [facility] [place], [place] [employer], under license to [employer]. All rights reserved Score:  Initial: 19 Most Recent: X (Date: -- ) Interpretation of Tool:  Represents activities that are increasingly more difficult (i.e. Bed mobility, Transfers, Gait). Score 24 23 22-20 19-15 14-10 9-7 6 Modifier CH CI CJ CK CL CM CN   
 
? Self Care:  
  - CURRENT STATUS: CK - 40%-59% impaired, limited or restricted  - GOAL STATUS: CK - 40%-59% impaired, limited or restricted  - D/C STATUS:  CK - 40%-59% impaired, limited or restricted Payor: BLUE CROSS Replaced by [facility] / Plan: [place] Replaced by [facility] / Product Type: PPO /   
 
Medical Necessity:    
· Reason for Services/Other Comments: · Use of outcome tool(s) and clinical judgement create a POC that gives a: LOW COMPLEXITY  
 
 
 
TREATMENT:  
(In addition to Assessment/Re-Assessment sessions the following treatments were rendered) Pre-treatment Symptoms/Complaints:  \"Pain typically gets worse with more activity. \" 
Pain: Initial:  
Pain Intensity 1: 9 Pain Location 1: Back Pain Orientation 1: Lower Pain Intervention(s) 1: Ambulation/Increased Activity  Post Session:  same Assessment/Reassessment only, no treatment provided today Braces/Orthotics/Lines/Etc:  
· drain justin Treatment/Session Assessment:   
· Response to Treatment:  eval only. · Interdisciplinary Collaboration:  
o Physical Therapist 
o Occupational Therapist 
o Registered Nurse · After treatment position/precautions:  
o Up in chair 
o Call light within reach 
o RN notified · Compliance with Program/Exercises: 
· Recommendations/Intent for next treatment session: Total Treatment Duration: OT Patient Time In/Time Out Time In: 200 Time Out: 5717 Papito Hester OT

## 2018-12-18 ENCOUNTER — HOSPITAL ENCOUNTER (OUTPATIENT)
Dept: GENERAL RADIOLOGY | Age: 53
Discharge: HOME | End: 2018-12-18
Payer: COMMERCIAL

## 2018-12-18 DIAGNOSIS — M43.16 SPONDYLOLISTHESIS AT L3-L4 LEVEL: ICD-10-CM

## 2018-12-18 PROCEDURE — 72100 X-RAY EXAM L-S SPINE 2/3 VWS: CPT

## 2019-03-19 ENCOUNTER — HOSPITAL ENCOUNTER (OUTPATIENT)
Dept: GENERAL RADIOLOGY | Age: 54
Discharge: HOME | End: 2019-03-19
Payer: COMMERCIAL

## 2019-03-19 DIAGNOSIS — M43.16 SPONDYLOLISTHESIS AT L3-L4 LEVEL: ICD-10-CM

## 2019-03-19 PROCEDURE — 72100 X-RAY EXAM L-S SPINE 2/3 VWS: CPT

## 2020-01-16 ENCOUNTER — HOSPITAL ENCOUNTER (OUTPATIENT)
Dept: GENERAL RADIOLOGY | Age: 55
Discharge: HOME | End: 2020-01-16
Payer: COMMERCIAL

## 2020-01-16 DIAGNOSIS — M43.16 SPONDYLOLISTHESIS AT L3-L4 LEVEL: ICD-10-CM

## 2020-01-16 PROBLEM — E66.09 CLASS 1 OBESITY DUE TO EXCESS CALORIES WITHOUT SERIOUS COMORBIDITY WITH BODY MASS INDEX (BMI) OF 33.0 TO 33.9 IN ADULT: Status: ACTIVE | Noted: 2020-01-16

## 2020-01-16 PROBLEM — S39.012A STRAIN OF LUMBAR REGION: Status: ACTIVE | Noted: 2020-01-16

## 2020-01-16 PROCEDURE — 72100 X-RAY EXAM L-S SPINE 2/3 VWS: CPT

## 2020-03-31 PROBLEM — E66.01 SEVERE OBESITY (HCC): Status: ACTIVE | Noted: 2020-03-31

## 2020-06-16 ENCOUNTER — HOSPITAL ENCOUNTER (OUTPATIENT)
Dept: GENERAL RADIOLOGY | Age: 55
Discharge: HOME | End: 2020-06-16
Payer: COMMERCIAL

## 2020-06-16 DIAGNOSIS — E66.01 SEVERE OBESITY (HCC): ICD-10-CM

## 2020-06-16 DIAGNOSIS — M43.16 SPONDYLOLISTHESIS AT L3-L4 LEVEL: ICD-10-CM

## 2020-06-16 DIAGNOSIS — S39.012A STRAIN OF LUMBAR REGION, INITIAL ENCOUNTER: ICD-10-CM

## 2020-06-16 PROCEDURE — 72100 X-RAY EXAM L-S SPINE 2/3 VWS: CPT

## 2020-07-01 ENCOUNTER — HOSPITAL ENCOUNTER (OUTPATIENT)
Dept: MRI IMAGING | Age: 55
Discharge: HOME | End: 2020-07-01
Attending: NEUROLOGICAL SURGERY
Payer: COMMERCIAL

## 2020-07-01 DIAGNOSIS — E66.09 CLASS 1 OBESITY DUE TO EXCESS CALORIES WITHOUT SERIOUS COMORBIDITY WITH BODY MASS INDEX (BMI) OF 33.0 TO 33.9 IN ADULT: ICD-10-CM

## 2020-07-01 DIAGNOSIS — M43.16 SPONDYLOLISTHESIS AT L3-L4 LEVEL: ICD-10-CM

## 2020-07-01 DIAGNOSIS — M48.062 LUMBAR STENOSIS WITH NEUROGENIC CLAUDICATION: ICD-10-CM

## 2020-07-01 PROCEDURE — 72158 MRI LUMBAR SPINE W/O & W/DYE: CPT

## 2020-07-01 PROCEDURE — A9575 INJ GADOTERATE MEGLUMI 0.1ML: HCPCS | Performed by: NEUROLOGICAL SURGERY

## 2020-07-01 PROCEDURE — 74011250636 HC RX REV CODE- 250/636: Performed by: NEUROLOGICAL SURGERY

## 2020-07-01 RX ORDER — GADOTERATE MEGLUMINE 376.9 MG/ML
20 INJECTION INTRAVENOUS
Status: COMPLETED | OUTPATIENT
Start: 2020-07-01 | End: 2020-07-01

## 2020-07-01 RX ORDER — SODIUM CHLORIDE 0.9 % (FLUSH) 0.9 %
10 SYRINGE (ML) INJECTION
Status: COMPLETED | OUTPATIENT
Start: 2020-07-01 | End: 2020-07-01

## 2020-07-01 RX ADMIN — GADOTERATE MEGLUMINE 20 ML: 376.9 INJECTION INTRAVENOUS at 09:19

## 2020-07-01 RX ADMIN — Medication 10 ML: at 09:19

## 2020-07-13 ENCOUNTER — APPOINTMENT (OUTPATIENT)
Dept: CT IMAGING | Age: 55
End: 2020-07-13
Attending: EMERGENCY MEDICINE
Payer: COMMERCIAL

## 2020-07-13 ENCOUNTER — HOSPITAL ENCOUNTER (EMERGENCY)
Age: 55
Discharge: HOME | End: 2020-07-13
Attending: EMERGENCY MEDICINE
Payer: COMMERCIAL

## 2020-07-13 VITALS
RESPIRATION RATE: 16 BRPM | HEIGHT: 69 IN | WEIGHT: 235 LBS | SYSTOLIC BLOOD PRESSURE: 125 MMHG | BODY MASS INDEX: 34.8 KG/M2 | OXYGEN SATURATION: 97 % | TEMPERATURE: 98.1 F | HEART RATE: 56 BPM | DIASTOLIC BLOOD PRESSURE: 63 MMHG

## 2020-07-13 DIAGNOSIS — R19.7 DIARRHEA OF PRESUMED INFECTIOUS ORIGIN: ICD-10-CM

## 2020-07-13 DIAGNOSIS — R19.7 DIARRHEA, UNSPECIFIED TYPE: ICD-10-CM

## 2020-07-13 DIAGNOSIS — K75.9 HEPATITIS: ICD-10-CM

## 2020-07-13 DIAGNOSIS — R10.32 ABDOMINAL PAIN, LLQ (LEFT LOWER QUADRANT): Primary | ICD-10-CM

## 2020-07-13 LAB
ALBUMIN SERPL-MCNC: 3.5 G/DL (ref 3.5–5)
ALBUMIN/GLOB SERPL: 1 {RATIO} (ref 1.2–3.5)
ALP SERPL-CCNC: 47 U/L (ref 50–136)
ALT SERPL-CCNC: 1152 U/L (ref 12–65)
ANION GAP SERPL CALC-SCNC: 6 MMOL/L (ref 7–16)
AST SERPL-CCNC: 677 U/L (ref 15–37)
BASOPHILS # BLD: 0.1 K/UL (ref 0–0.2)
BASOPHILS NFR BLD: 1 % (ref 0–2)
BILIRUB SERPL-MCNC: 0.4 MG/DL (ref 0.2–1.1)
BUN SERPL-MCNC: 17 MG/DL (ref 6–23)
CALCIUM SERPL-MCNC: 8.4 MG/DL (ref 8.3–10.4)
CHLORIDE SERPL-SCNC: 106 MMOL/L (ref 98–107)
CO2 SERPL-SCNC: 23 MMOL/L (ref 21–32)
CREAT SERPL-MCNC: 0.89 MG/DL (ref 0.8–1.5)
DIFFERENTIAL METHOD BLD: ABNORMAL
EOSINOPHIL # BLD: 0.1 K/UL (ref 0–0.8)
EOSINOPHIL NFR BLD: 1 % (ref 0.5–7.8)
ERYTHROCYTE [DISTWIDTH] IN BLOOD BY AUTOMATED COUNT: 13.8 % (ref 11.9–14.6)
GLOBULIN SER CALC-MCNC: 3.6 G/DL (ref 2.3–3.5)
GLUCOSE SERPL-MCNC: 89 MG/DL (ref 65–100)
HCT VFR BLD AUTO: 40.8 % (ref 41.1–50.3)
HGB BLD-MCNC: 13.3 G/DL (ref 13.6–17.2)
IMM GRANULOCYTES # BLD AUTO: 0.1 K/UL (ref 0–0.5)
IMM GRANULOCYTES NFR BLD AUTO: 2 % (ref 0–5)
LIPASE SERPL-CCNC: 115 U/L (ref 73–393)
LYMPHOCYTES # BLD: 2.3 K/UL (ref 0.5–4.6)
LYMPHOCYTES NFR BLD: 36 % (ref 13–44)
MCH RBC QN AUTO: 31.1 PG (ref 26.1–32.9)
MCHC RBC AUTO-ENTMCNC: 32.6 G/DL (ref 31.4–35)
MCV RBC AUTO: 95.6 FL (ref 79.6–97.8)
MONOCYTES # BLD: 0.9 K/UL (ref 0.1–1.3)
MONOCYTES NFR BLD: 14 % (ref 4–12)
NEUTS SEG # BLD: 3 K/UL (ref 1.7–8.2)
NEUTS SEG NFR BLD: 47 % (ref 43–78)
NRBC # BLD: 0 K/UL (ref 0–0.2)
PLATELET # BLD AUTO: 292 K/UL (ref 150–450)
PMV BLD AUTO: 9.9 FL (ref 9.4–12.3)
POTASSIUM SERPL-SCNC: 4 MMOL/L (ref 3.5–5.1)
PROT SERPL-MCNC: 7.1 G/DL (ref 6.3–8.2)
RBC # BLD AUTO: 4.27 M/UL (ref 4.23–5.6)
SODIUM SERPL-SCNC: 135 MMOL/L (ref 136–145)
WBC # BLD AUTO: 6.4 K/UL (ref 4.3–11.1)

## 2020-07-13 PROCEDURE — 74011000258 HC RX REV CODE- 258: Performed by: EMERGENCY MEDICINE

## 2020-07-13 PROCEDURE — 99284 EMERGENCY DEPT VISIT MOD MDM: CPT

## 2020-07-13 PROCEDURE — 74011636320 HC RX REV CODE- 636/320: Performed by: EMERGENCY MEDICINE

## 2020-07-13 PROCEDURE — 74177 CT ABD & PELVIS W/CONTRAST: CPT

## 2020-07-13 PROCEDURE — 80074 ACUTE HEPATITIS PANEL: CPT

## 2020-07-13 PROCEDURE — 80053 COMPREHEN METABOLIC PANEL: CPT

## 2020-07-13 PROCEDURE — 83690 ASSAY OF LIPASE: CPT

## 2020-07-13 PROCEDURE — 85025 COMPLETE CBC W/AUTO DIFF WBC: CPT

## 2020-07-13 PROCEDURE — 96360 HYDRATION IV INFUSION INIT: CPT

## 2020-07-13 RX ORDER — SODIUM CHLORIDE 0.9 % (FLUSH) 0.9 %
10 SYRINGE (ML) INJECTION
Status: COMPLETED | OUTPATIENT
Start: 2020-07-13 | End: 2020-07-13

## 2020-07-13 RX ORDER — ONDANSETRON 8 MG/1
8 TABLET, ORALLY DISINTEGRATING ORAL
Qty: 12 TAB | Refills: 1 | Status: SHIPPED | OUTPATIENT
Start: 2020-07-13 | End: 2020-08-03 | Stop reason: CLARIF

## 2020-07-13 RX ORDER — TRAMADOL HYDROCHLORIDE 50 MG/1
50-100 TABLET ORAL
Qty: 20 TAB | Refills: 0 | Status: SHIPPED | OUTPATIENT
Start: 2020-07-13 | End: 2020-07-16

## 2020-07-13 RX ORDER — DIPHENOXYLATE HYDROCHLORIDE AND ATROPINE SULFATE 2.5; .025 MG/1; MG/1
2 TABLET ORAL
Qty: 20 TAB | Refills: 0 | Status: SHIPPED | OUTPATIENT
Start: 2020-07-13 | End: 2020-08-03 | Stop reason: CLARIF

## 2020-07-13 RX ADMIN — IOPAMIDOL 100 ML: 755 INJECTION, SOLUTION INTRAVENOUS at 13:44

## 2020-07-13 RX ADMIN — SODIUM CHLORIDE 100 ML: 900 INJECTION, SOLUTION INTRAVENOUS at 13:44

## 2020-07-13 RX ADMIN — Medication 10 ML: at 13:44

## 2020-07-13 NOTE — DISCHARGE INSTRUCTIONS
zofran as needed for nausea  [employer] and ultram for pain  Lomotil as needed for diarrhea    Follow up with dr Yovanny Humphreys regarding the liver irritation  Hepatitis panel should be ready in a few days  Avoid any tylenol containing products      Patient Education        Abdominal Pain: Care Instructions  Your Care Instructions     Abdominal pain has many possible causes. Some aren't serious and get better on their own in a few days. Others need more testing and treatment. If your pain continues or gets worse, you need to be rechecked and may need more tests to find out what is wrong. You may need surgery to correct the problem. Don't ignore new symptoms, such as fever, nausea and vomiting, urination problems, pain that gets worse, and dizziness. These may be signs of a more serious problem. Your doctor may have recommended a follow-up visit in the next 8 to 12 hours. If you are not getting better, you may need more tests or treatment. The doctor has checked you carefully, but problems can develop later. If you notice any problems or new symptoms, get medical treatment right away. Follow-up care is a key part of your treatment and safety. Be sure to make and go to all appointments, and call your doctor if you are having problems. It's also a good idea to know your test results and keep a list of the medicines you take. How can you care for yourself at home? · Rest until you feel better. · To prevent dehydration, drink plenty of fluids, enough so that your urine is light yellow or clear like water. Choose water and other caffeine-free clear liquids until you feel better. If you have kidney, heart, or liver disease and have to limit fluids, talk with your doctor before you increase the amount of fluids you drink. · If your stomach is upset, eat mild foods, such as rice, dry toast or crackers, bananas, and applesauce. Try eating several small meals instead of two or three large ones.   · Wait until 48 hours after all symptoms have gone away before you have spicy foods, alcohol, and drinks that contain caffeine. · Do not eat foods that are high in fat. · Avoid anti-inflammatory medicines such as aspirin, ibuprofen (Advil, Motrin), and naproxen (Aleve). These can cause stomach upset. Talk to your doctor if you take daily aspirin for another health problem. When should you call for help? [id number] anytime you think you may need emergency care. For example, call if:  · You passed out (lost consciousness). · You pass maroon or very bloody stools. · You vomit blood or what looks like coffee grounds. · You have new, severe belly pain. Call your doctor now or seek immediate medical care if:  · Your pain gets worse, especially if it becomes focused in one area of your belly. · You have a new or higher fever. · Your stools are black and look like tar, or they have streaks of blood. · You have unexpected vaginal bleeding. · You have symptoms of a urinary tract infection. These may include:  ? Pain when you urinate. ? Urinating more often than usual.  ? Blood in your urine. · You are dizzy or lightheaded, or you feel like you may faint. Watch closely for changes in your health, and be sure to contact your doctor if:  · You are not getting better after 1 day (24 hours). Where can you learn more? Go to http://denver-diaz.info/  Enter M724 in the search box to learn more about \"Abdominal Pain: Care Instructions. \"  Current as of: June 26, 2019               Content Version: 12.5  © 6220-4548 Healthwise, Incorporated. Care instructions adapted under license by [employer] (which disclaims liability or warranty for this information). If you have questions about a medical condition or this instruction, always ask your healthcare professional. [address] any warranty or liability for your use of this information.        Patient Education        Hepatitis A: Care Instructions  Your Care Instructions     Hepatitis A is a virus that can infect the liver. Most people who get it get better within 2 months and don't have liver problems later. This virus is found in stool (feces). You can get it if you eat food or drink water that was in contact with infected stool. You can also get it from close contact with an infected person. Common symptoms include feeling tired or having yellow eyes and skin (jaundice). They also include nausea, diarrhea, vomiting, and a severe loss of water (dehydration). Some people don't notice any symptoms for up to 30 days. But even without symptoms, you still can give the infection to other people. Be sure to read the tips below to learn how to avoid spreading the virus. Some people get a shot if they know they were exposed to the virus in the past 2 weeks. This shot may prevent getting infected with hepatitis A. After you get hepatitis A one time, you can't get it again. But you can still get other types of hepatitis. Follow-up care is a key part of your treatment and safety. Be sure to make and go to all appointments, and call your doctor if you are having problems. It's also a good idea to know your test results and keep a list of the medicines you take. How can you care for yourself at home? · Reduce your activity level to match your energy level. · Avoid alcohol for 2 to 3 months. It can make liver problems worse. · Make sure your doctor knows all the medicines you take. Some medicines, such as acetaminophen (Tylenol), can make liver problems worse. Do not take any new medicines unless your doctor says it is okay. · Be safe with medicines. Take your medicines exactly as prescribed. Call your doctor if you think you are having a problem with your medicine. · If you have nausea or vomiting, try to eat smaller meals and eat more often. · Drink plenty of fluids, enough so that your urine is light yellow or clear like water.  If you have kidney, heart, or liver disease and have to limit fluids, talk with your doctor before you increase the amount of fluids you drink. · If you have itchy skin, it can help to keep cool and avoid the sun. It may also help to wear cotton clothes. You can also talk to your doctor about over-the-counter medicines for itching. These include diphenhydramine (Benadryl) and loratadine (Claritin). Read and follow the instructions on the label. To avoid spreading hepatitis A  · Wash your hands with soap and clean, running water right after you use the toilet and before you prepare food. · Tell those you live with or have had sex with that you have hepatitis A. They may need a shot to prevent infection. · Don't have sexual contact with anyone while you're infected. · Tell anyone who may come in contact with your blood or stools about your illness. This includes your doctor, dentist, and other health care professionals. When should you call for help? [id number] anytime you think you may need emergency care. For example, call if:  · You passed out (lost consciousness). · You vomit blood or what looks like coffee grounds. · You are suddenly confused and cannot think clearly. Call your doctor now or seek immediate medical care if:  · You are dizzy or lightheaded, or you feel like you may faint. · You have signs of needing more fluids. You have sunken eyes and a dry mouth, and you pass only a little dark urine. · You have nausea and vomiting that does not go away. Watch closely for changes in your health, and be sure to contact your doctor if:  · You do not get better as expected. Where can you learn more? Go to http://denver-diaz.info/  Enter O672 in the search box to learn more about \"Hepatitis A: Care Instructions. \"  Current as of: February 11, 2020               Content Version: 12.5  © 4322-6021 Healthwise, Incorporated.    Care instructions adapted under license by [employer] (which disclaims liability or warranty for this information). If you have questions about a medical condition or this instruction, always ask your healthcare professional. [address] any warranty or liability for your use of this information.

## 2020-07-13 NOTE — ED PROVIDER NOTES
62 y.o male presents with 5 days of lower abdominal pain starting 7/09. Had blood work drawn that day, with plans for an outpatient ct  By 7/11 he was having 20+ episodes of nonbloody diarrhea, and 3-4 spells of emesis. Diarrhea has slacked off to 4 yesterday and 2 today.     Pain persistent in lower quadrants, no pain in back  Pain worse with movement, but not bumps on car ride here  No change with eating  No remedies tried    H/o cholecystectomy, denies diverticulosis    No ill contacts or recent abx  No ivdu, tattoos, or transfusions  Urine darker           Past Medical History:   Diagnosis Date    CAD (coronary artery disease)     mi--2014--- cabg 2014--- followed by dr Renato Rushing Chronic pain     lumbar    Depressive disorder, not elsewhere classified     on med    Essential hypertension, benign     controlled with med- affirms would be SOB w 1 flight of steps    GERD (gastroesophageal reflux disease)     controlled with med    Obese     bmi 32    Pure hypercholesterolemia     Sleep apnea     dx long ago-- per pt-- never had any follow up- wife says he no longer snores since CABG       Past Surgical History:   Procedure Laterality Date    ABDOMEN SURGERY PROC UNLISTED  08/2018    armen    CARDIAC SURG PROCEDURE UNLIST  2014    CABG     CARDIAC SURG PROCEDURE UNLIST  2015    1 stent    COLONOSCOPY N/A 8/17/2018    COLONOSCOPY/ 32 performed by [facility]yn [facility] at [facility], november 2018    HX KNEE ARTHROSCOPY Right YRS AGO    NM COLONOSCOPY FLX DX W/COLLJ [address] WHEN PFRMD  8/17/2018              Family History:   Problem Relation Age of Onset    Cancer Mother         leukemia    Heart Disease Father         CABG, no MI     Hypertension Father     Elevated Lipids Father     Diabetes Sister         type 2; insulin dep       Social History     Socioeconomic History    Marital status:      Spouse name: Not on file    Number of children: Not on file    Years of education: Not on file    Highest education level: Not on file   Occupational History    Not on file   Social Needs    Financial resource strain: Not on file    Food insecurity     Worry: Not on file     Inability: Not on file    Transportation needs     Medical: Not on file     Non-medical: Not on file   Tobacco Use    Smoking status: Former Smoker     Packs/day: 0.50     Years: 25.00     Pack years: 12.50    Smokeless tobacco: Never Used    Tobacco comment: quit Oct 2018   Substance and Sexual Activity    Alcohol use: Yes     Comment: occasional    Drug use: No    Sexual activity: Not on file   Lifestyle    Physical activity     Days per week: Not on file     Minutes per session: Not on file    Stress: Not on file   Relationships    Social connections     Talks on phone: Not on file     Gets together: Not on file     Attends Anabaptism service: Not on file     Active member of club or organization: Not on file     Attends meetings of clubs or organizations: Not on file     Relationship status: Not on file    Intimate partner violence     Fear of current or ex partner: Not on file     Emotionally abused: Not on file     Physically abused: Not on file     Forced sexual activity: Not on file   Other Topics Concern    Not on file   Social History Narrative    Not on file         ALLERGIES: Patient has no known allergies. Review of Systems   Constitutional: Negative for chills and fever. HENT: Negative for rhinorrhea and sore throat. Eyes: Negative for discharge and redness. Respiratory: Negative for cough and shortness of breath. Cardiovascular: Negative for chest pain and palpitations. Gastrointestinal: Positive for abdominal pain, diarrhea, nausea and vomiting. Genitourinary: Positive for decreased urine volume. Negative for dysuria. Musculoskeletal: Negative for arthralgias and back pain. Skin: Negative for rash.    Neurological: Negative for dizziness and headaches. All other systems reviewed and are negative. Vitals:    07/13/20 1106   BP: 137/81   Pulse: (!) 59   Resp: 16   Temp: 97.8 °F (36.6 °C)   SpO2: 96%   Weight: 106.6 kg (235 lb)   Height: 5' 9\" (1.753 m)            Physical Exam  Vitals signs and nursing note reviewed. Constitutional:       General: He is not in acute distress. Appearance: Normal appearance. He is well-developed. He is not ill-appearing, toxic-appearing or diaphoretic. HENT:      Head: Normocephalic and atraumatic. Eyes:      General: No scleral icterus. Right eye: No discharge. Left eye: No discharge. Conjunctiva/sclera: Conjunctivae normal.      Pupils: Pupils are equal, round, and reactive to light. Neck:      Musculoskeletal: Normal range of motion and neck supple. Cardiovascular:      Rate and Rhythm: Normal rate and regular rhythm. Heart sounds: Normal heart sounds. No murmur. No gallop. Pulmonary:      Effort: Pulmonary effort is normal. No respiratory distress. Breath sounds: Normal breath sounds. No wheezing or rales. Abdominal:      General: Bowel sounds are normal.      Palpations: Abdomen is soft. Tenderness: There is abdominal tenderness (very mild) in the right lower quadrant, suprapubic area and left lower quadrant. There is no guarding. Musculoskeletal: Normal range of motion. Skin:     General: Skin is warm and dry. Neurological:      General: No focal deficit present. Mental Status: He is alert and oriented to person, place, and time. Mental status is at baseline. Motor: No abnormal muscle tone.       Comments: cni 2-12 grossly   Psychiatric:         Mood and Affect: Mood normal.         Behavior: Behavior normal.          MDM  Number of Diagnoses or Management Options  Diagnosis management comments: Medical decision making note:  Abdominal pain with diarrhea > vomiting  pcp with some concern for hep a  Diff dx to include appy, colitis, diverticulitis  Benign exam, check labs and ct  This concludes the \"medical decision making note\" part of this emergency department visit note.            Procedures

## 2020-07-13 NOTE — ED NOTES
I have reviewed discharge instructions with the patient. The patient verbalized understanding. Patient left ED via Discharge Method: ambulatory to Home with self. Opportunity for questions and clarification provided. Patient given 3 scripts. To continue your aftercare when you leave the hospital, you may receive an automated call from our care team to check in on how you are doing. This is a free service and part of our promise to provide the best care and service to meet your aftercare needs.  If you have questions, or wish to unsubscribe from this service please call [phone]. Thank you for Choosing our [facility] Emergency Department.

## 2020-07-13 NOTE — ED TRIAGE NOTES
Pt states he has been having lower abd pain since Thursday and went to see PCP who did some blood work. States the pain did not go away over the weekend and he has been having some vomiting now also so PCP told him to come to the ER. States they said he may have Hepatitis A based on some lab results.

## 2020-07-15 LAB
HAV IGM SERPL QL IA: NEGATIVE
HBV CORE IGM SERPL QL IA: NEGATIVE
HBV SURFACE AG SERPL QL IA: NEGATIVE
HCV AB S/CO SERPL IA: <0.1 S/CO RATIO (ref 0–0.9)

## 2020-08-03 ENCOUNTER — HOSPITAL ENCOUNTER (OUTPATIENT)
Dept: SURGERY | Age: 55
Discharge: HOME | End: 2020-08-03
Payer: COMMERCIAL

## 2020-08-03 VITALS
RESPIRATION RATE: 16 BRPM | TEMPERATURE: 98.5 F | WEIGHT: 235.5 LBS | SYSTOLIC BLOOD PRESSURE: 113 MMHG | HEIGHT: 68 IN | BODY MASS INDEX: 35.69 KG/M2 | OXYGEN SATURATION: 98 % | DIASTOLIC BLOOD PRESSURE: 60 MMHG

## 2020-08-03 LAB
BACTERIA SPEC CULT: NORMAL
SERVICE CMNT-IMP: NORMAL

## 2020-08-03 PROCEDURE — 77030027138 HC INCENT SPIROMETER -A

## 2020-08-03 PROCEDURE — 87641 MR-STAPH DNA AMP PROBE: CPT

## 2020-08-03 PROCEDURE — 93005 ELECTROCARDIOGRAM TRACING: CPT | Performed by: ANESTHESIOLOGY

## 2020-08-03 NOTE — PERIOP NOTES
Patient verified name and     Order for consent found in EHR and matches case posting; patient verified. Type 1B surgery, assessment complete. Labs per spine protocol: MRSA/MSSA  Labs per anesthesia protocol: none  EK/3/20 acceptable per anesthesia protocol, and comparable to last EKG in [facility]. Patient watched video, received recovery diary and incentive spirometer with instructions. Called and spoke with Randee Dey at Dr. Chaya Bernabe office. Note found where they received clearance to hold Effient 5 days but could not find the faxed clearance form [facility]. Randee Dey stated she will get that in the system. A negative Covid swab result is required to proceed with surgery; The testing center is located at the [address], [place]. An appointment is required therefore the patient will be contacted by the Covid swab team. The testing clinic is closed from  for lunch and on weekends. For questions or concerns the patient should call [phone]. Appointment date/time 8/3/20 found in EHR and provided to patient. Hospital approved surgical skin cleanser and instructions given per hospital policy. Patient provided with and instructed on educational handouts including Guide to Surgery, Pain Management, Hand Hygiene, Blood Transfusion Education, and [facility] Brochure. Patient answered medical/surgical history questions at their best of ability. All prior to admission medications documented in [facility]. Original medication prescription list visualized during patient appointment. Patient instructed to hold all vitamins 7 days prior to surgery and NSAIDS 5 days prior to surgery, patient verbalized understanding. Patient teach back successful and patient demonstrates knowledge of instructions.

## 2020-08-03 NOTE — PERIOP NOTES
PLEASE CONTINUE TAKING ALL PRESCRIPTION MEDICATIONS UP TO THE DAY OF SURGERY UNLESS OTHERWISE DIRECTED BELOW. DISCONTINUE all vitamins and supplements 7 days prior to surgery. DISCONTINUE Non-Steriodal Anti-Inflammatory (NSAIDS) such as Advil and Aleve 5 days prior to surgery. Home Medications to take  the day of surgery    Amlodipine, Aspirin, Isosorbide, Levothyroxine, Metoprolol, Omeprazole and Ranexa           Home Medications   to Hold   Vitamins, Supplements, and Herbals. Non-Steriodal Anti-Inflammatory (NSAIDS) such as Advil and Aleve. Effient - Per Dr. Sridhar Kinsey office hold 5 days. Last dose will be Aug 4, 2020        Comments    Please bring incentive spirometer, recovery diary and nitro tablets to hospital on the day of surgery. *Visitor policy of 1 visitor per patient discussed. Please do not bring home medications with you on the day of surgery unless otherwise directed by your nurse. If you are instructed to bring home medications, please give them to your nurse as they will be administered by the nursing staff. If you have any questions, please call [address] [place] [address] [phone] or [facility] [phone]. A copy of this note was provided to the patient for reference.

## 2020-08-03 NOTE — PERIOP NOTES
Recent Results (from the past 12 hour(s))   EKG, 12 LEAD, INITIAL    Collection Time: 08/03/20 11:13 AM   Result Value Ref Range    Ventricular Rate 57 BPM    Atrial Rate 57 BPM    P-R Interval 126 ms    QRS Duration 132 ms    Q-T Interval 432 ms    QTC Calculation (Bezet) 420 ms    Calculated P Axis -29 degrees    Calculated R Axis -3 degrees    Calculated T Axis 36 degrees    Diagnosis       Sinus bradycardia  Right bundle branch block  Inferior infarct , age undetermined  Abnormal ECG  No previous ECGs available     MSSA/MRSA SC BY PCR, NASAL SWAB    Collection Time: 08/03/20 11:14 AM    Specimen: Nasal swab   Result Value Ref Range    Special Requests: NO SPECIAL REQUESTS      Culture result:        SA target not detected. A MRSA NEGATIVE, SA NEGATIVE test result does not preclude MRSA or SA nasal colonization. Results reviewed and no further action required.

## 2020-08-04 LAB
ATRIAL RATE: 57 BPM
CALCULATED P AXIS, ECG09: -29 DEGREES
CALCULATED R AXIS, ECG10: -3 DEGREES
CALCULATED T AXIS, ECG11: 36 DEGREES
DIAGNOSIS, 93000: NORMAL
P-R INTERVAL, ECG05: 126 MS
Q-T INTERVAL, ECG07: 432 MS
QRS DURATION, ECG06: 132 MS
QTC CALCULATION (BEZET), ECG08: 420 MS
VENTRICULAR RATE, ECG03: 57 BPM

## 2020-08-07 ENCOUNTER — ANESTHESIA EVENT (OUTPATIENT)
Dept: SURGERY | Age: 55
End: 2020-08-07
Payer: COMMERCIAL

## 2020-08-10 ENCOUNTER — APPOINTMENT (OUTPATIENT)
Dept: GENERAL RADIOLOGY | Age: 55
End: 2020-08-10
Attending: NEUROLOGICAL SURGERY
Payer: COMMERCIAL

## 2020-08-10 ENCOUNTER — ANESTHESIA (OUTPATIENT)
Dept: SURGERY | Age: 55
End: 2020-08-10
Payer: COMMERCIAL

## 2020-08-10 ENCOUNTER — HOSPITAL ENCOUNTER (OUTPATIENT)
Age: 55
Setting detail: OUTPATIENT SURGERY
Discharge: HOME | End: 2020-08-10
Attending: NEUROLOGICAL SURGERY | Admitting: NEUROLOGICAL SURGERY
Payer: COMMERCIAL

## 2020-08-10 VITALS
TEMPERATURE: 98 F | DIASTOLIC BLOOD PRESSURE: 57 MMHG | WEIGHT: 231.8 LBS | HEART RATE: 64 BPM | RESPIRATION RATE: 15 BRPM | HEIGHT: 68 IN | SYSTOLIC BLOOD PRESSURE: 108 MMHG | OXYGEN SATURATION: 95 % | BODY MASS INDEX: 35.13 KG/M2

## 2020-08-10 DIAGNOSIS — M48.062 LUMBAR STENOSIS WITH NEUROGENIC CLAUDICATION: Primary | ICD-10-CM

## 2020-08-10 PROCEDURE — 76010000162 HC OR TIME 1.5 TO 2 HR INTENSV-TIER 1: Performed by: NEUROLOGICAL SURGERY

## 2020-08-10 PROCEDURE — 76210000020 HC REC RM PH II FIRST 0.5 HR: Performed by: NEUROLOGICAL SURGERY

## 2020-08-10 PROCEDURE — 77030005401 HC CATH RAD ARRO -A: Performed by: ANESTHESIOLOGY

## 2020-08-10 PROCEDURE — 76060000034 HC ANESTHESIA 1.5 TO 2 HR: Performed by: NEUROLOGICAL SURGERY

## 2020-08-10 PROCEDURE — 74011250637 HC RX REV CODE- 250/637: Performed by: NEUROLOGICAL SURGERY

## 2020-08-10 PROCEDURE — 74011250636 HC RX REV CODE- 250/636: Performed by: NURSE ANESTHETIST, CERTIFIED REGISTERED

## 2020-08-10 PROCEDURE — 77030040922 HC BLNKT HYPOTHRM STRY -A: Performed by: ANESTHESIOLOGY

## 2020-08-10 PROCEDURE — 72020 X-RAY EXAM OF SPINE 1 VIEW: CPT

## 2020-08-10 PROCEDURE — 77030030163 HC BN WAX J&J -A: Performed by: NEUROLOGICAL SURGERY

## 2020-08-10 PROCEDURE — 74011000250 HC RX REV CODE- 250: Performed by: NURSE ANESTHETIST, CERTIFIED REGISTERED

## 2020-08-10 PROCEDURE — 77030003029 HC SUT VCRL J&J -B: Performed by: NEUROLOGICAL SURGERY

## 2020-08-10 PROCEDURE — 77030012894: Performed by: NEUROLOGICAL SURGERY

## 2020-08-10 PROCEDURE — 77030008468 HC STPLR SKN VISIS TELE -A: Performed by: NEUROLOGICAL SURGERY

## 2020-08-10 PROCEDURE — 77030040106 HC FCPS BIPOLAR DISP INLC -D: Performed by: NEUROLOGICAL SURGERY

## 2020-08-10 PROCEDURE — 74011000250 HC RX REV CODE- 250: Performed by: NEUROLOGICAL SURGERY

## 2020-08-10 PROCEDURE — 74011250636 HC RX REV CODE- 250/636: Performed by: ANESTHESIOLOGY

## 2020-08-10 PROCEDURE — 77030037088 HC TUBE ENDOTRACH ORAL NSL COVD-A: Performed by: ANESTHESIOLOGY

## 2020-08-10 PROCEDURE — 77030012935 HC DRSG AQUACEL BMS -B: Performed by: NEUROLOGICAL SURGERY

## 2020-08-10 PROCEDURE — 74011250636 HC RX REV CODE- 250/636: Performed by: NEUROLOGICAL SURGERY

## 2020-08-10 PROCEDURE — 74011250637 HC RX REV CODE- 250/637: Performed by: ANESTHESIOLOGY

## 2020-08-10 PROCEDURE — 77030013794 HC KT TRNSDUC BLD EDWD -B: Performed by: ANESTHESIOLOGY

## 2020-08-10 PROCEDURE — 77030040361 HC SLV COMPR DVT MDII -B: Performed by: NEUROLOGICAL SURGERY

## 2020-08-10 PROCEDURE — 77030019908 HC STETH ESOPH SIMS -A: Performed by: ANESTHESIOLOGY

## 2020-08-10 PROCEDURE — 76210000063 HC OR PH I REC FIRST 0.5 HR: Performed by: NEUROLOGICAL SURGERY

## 2020-08-10 PROCEDURE — 77030018390 HC SPNG HEMSTAT2 J&J -B: Performed by: NEUROLOGICAL SURGERY

## 2020-08-10 PROCEDURE — 77030039267 HC ADH SKN EXOFIN S2SG -B: Performed by: NEUROLOGICAL SURGERY

## 2020-08-10 PROCEDURE — 77030019557 HC ELECTRD VES SEAL MEDT -F: Performed by: NEUROLOGICAL SURGERY

## 2020-08-10 PROCEDURE — 77030039425 HC BLD LARYNG TRULITE DISP TELE -A: Performed by: ANESTHESIOLOGY

## 2020-08-10 PROCEDURE — 77030013292 HC BOWL MX PRSM J&J -A: Performed by: ANESTHESIOLOGY

## 2020-08-10 RX ORDER — FENTANYL CITRATE 50 UG/ML
INJECTION, SOLUTION INTRAMUSCULAR; INTRAVENOUS AS NEEDED
Status: DISCONTINUED | OUTPATIENT
Start: 2020-08-10 | End: 2020-08-10 | Stop reason: HOSPADM

## 2020-08-10 RX ORDER — GLYCOPYRROLATE 0.2 MG/ML
INJECTION INTRAMUSCULAR; INTRAVENOUS AS NEEDED
Status: DISCONTINUED | OUTPATIENT
Start: 2020-08-10 | End: 2020-08-10 | Stop reason: HOSPADM

## 2020-08-10 RX ORDER — ACETAMINOPHEN 500 MG
1000 TABLET ORAL
Status: DISCONTINUED | OUTPATIENT
Start: 2020-08-10 | End: 2020-08-10 | Stop reason: HOSPADM

## 2020-08-10 RX ORDER — LIDOCAINE HYDROCHLORIDE 20 MG/ML
INJECTION, SOLUTION EPIDURAL; INFILTRATION; INTRACAUDAL; PERINEURAL AS NEEDED
Status: DISCONTINUED | OUTPATIENT
Start: 2020-08-10 | End: 2020-08-10 | Stop reason: HOSPADM

## 2020-08-10 RX ORDER — ACETAMINOPHEN 500 MG
1000 TABLET ORAL ONCE
Status: COMPLETED | OUTPATIENT
Start: 2020-08-10 | End: 2020-08-10

## 2020-08-10 RX ORDER — SODIUM CHLORIDE, SODIUM LACTATE, POTASSIUM CHLORIDE, CALCIUM CHLORIDE 600; 310; 30; 20 MG/100ML; MG/100ML; MG/100ML; MG/100ML
150 INJECTION, SOLUTION INTRAVENOUS CONTINUOUS
Status: DISCONTINUED | OUTPATIENT
Start: 2020-08-11 | End: 2020-08-10 | Stop reason: HOSPADM

## 2020-08-10 RX ORDER — FENTANYL CITRATE 50 UG/ML
100 INJECTION, SOLUTION INTRAMUSCULAR; INTRAVENOUS ONCE
Status: DISCONTINUED | OUTPATIENT
Start: 2020-08-11 | End: 2020-08-10 | Stop reason: HOSPADM

## 2020-08-10 RX ORDER — SODIUM CHLORIDE 0.9 % (FLUSH) 0.9 %
5-40 SYRINGE (ML) INJECTION EVERY 8 HOURS
Status: DISCONTINUED | OUTPATIENT
Start: 2020-08-10 | End: 2020-08-10 | Stop reason: HOSPADM

## 2020-08-10 RX ORDER — ONDANSETRON 2 MG/ML
INJECTION INTRAMUSCULAR; INTRAVENOUS AS NEEDED
Status: DISCONTINUED | OUTPATIENT
Start: 2020-08-10 | End: 2020-08-10 | Stop reason: HOSPADM

## 2020-08-10 RX ORDER — EPHEDRINE SULFATE/0.9% NACL/PF 50 MG/5 ML
SYRINGE (ML) INTRAVENOUS AS NEEDED
Status: DISCONTINUED | OUTPATIENT
Start: 2020-08-10 | End: 2020-08-10 | Stop reason: HOSPADM

## 2020-08-10 RX ORDER — SODIUM CHLORIDE 9 MG/ML
50 INJECTION, SOLUTION INTRAVENOUS CONTINUOUS
Status: DISCONTINUED | OUTPATIENT
Start: 2020-08-10 | End: 2020-08-10 | Stop reason: HOSPADM

## 2020-08-10 RX ORDER — OXYCODONE AND ACETAMINOPHEN 10; 325 MG/1; MG/1
1 TABLET ORAL
Qty: 21 TAB | Refills: 0 | Status: SHIPPED | OUTPATIENT
Start: 2020-08-10 | End: 2020-08-17

## 2020-08-10 RX ORDER — FENTANYL CITRATE 50 UG/ML
100 INJECTION, SOLUTION INTRAMUSCULAR; INTRAVENOUS ONCE
Status: DISCONTINUED | OUTPATIENT
Start: 2020-08-10 | End: 2020-08-10 | Stop reason: HOSPADM

## 2020-08-10 RX ORDER — LABETALOL HYDROCHLORIDE 5 MG/ML
INJECTION, SOLUTION INTRAVENOUS AS NEEDED
Status: DISCONTINUED | OUTPATIENT
Start: 2020-08-10 | End: 2020-08-10 | Stop reason: HOSPADM

## 2020-08-10 RX ORDER — HYDROCODONE BITARTRATE AND ACETAMINOPHEN 5; 325 MG/1; MG/1
1 TABLET ORAL AS NEEDED
Status: DISCONTINUED | OUTPATIENT
Start: 2020-08-10 | End: 2020-08-10 | Stop reason: HOSPADM

## 2020-08-10 RX ORDER — SODIUM CHLORIDE, SODIUM LACTATE, POTASSIUM CHLORIDE, CALCIUM CHLORIDE 600; 310; 30; 20 MG/100ML; MG/100ML; MG/100ML; MG/100ML
150 INJECTION, SOLUTION INTRAVENOUS CONTINUOUS
Status: DISCONTINUED | OUTPATIENT
Start: 2020-08-10 | End: 2020-08-10 | Stop reason: HOSPADM

## 2020-08-10 RX ORDER — THROMBIN, TOPICAL (BOVINE) 20000 UNIT
KIT TOPICAL AS NEEDED
Status: DISCONTINUED | OUTPATIENT
Start: 2020-08-10 | End: 2020-08-10 | Stop reason: HOSPADM

## 2020-08-10 RX ORDER — BUPIVACAINE HYDROCHLORIDE AND EPINEPHRINE 5; 5 MG/ML; UG/ML
INJECTION, SOLUTION EPIDURAL; INTRACAUDAL; PERINEURAL AS NEEDED
Status: DISCONTINUED | OUTPATIENT
Start: 2020-08-10 | End: 2020-08-10 | Stop reason: HOSPADM

## 2020-08-10 RX ORDER — DEXAMETHASONE SODIUM PHOSPHATE 100 MG/10ML
INJECTION INTRAMUSCULAR; INTRAVENOUS AS NEEDED
Status: DISCONTINUED | OUTPATIENT
Start: 2020-08-10 | End: 2020-08-10 | Stop reason: HOSPADM

## 2020-08-10 RX ORDER — PROPOFOL 10 MG/ML
INJECTION, EMULSION INTRAVENOUS AS NEEDED
Status: DISCONTINUED | OUTPATIENT
Start: 2020-08-10 | End: 2020-08-10 | Stop reason: HOSPADM

## 2020-08-10 RX ORDER — LIDOCAINE HYDROCHLORIDE 10 MG/ML
0.1 INJECTION INFILTRATION; PERINEURAL AS NEEDED
Status: DISCONTINUED | OUTPATIENT
Start: 2020-08-10 | End: 2020-08-10 | Stop reason: HOSPADM

## 2020-08-10 RX ORDER — CEFAZOLIN SODIUM/WATER 2 G/20 ML
2 SYRINGE (ML) INTRAVENOUS
Status: COMPLETED | OUTPATIENT
Start: 2020-08-10 | End: 2020-08-10

## 2020-08-10 RX ORDER — FAMOTIDINE 20 MG/1
20 TABLET, FILM COATED ORAL ONCE
Status: COMPLETED | OUTPATIENT
Start: 2020-08-10 | End: 2020-08-10

## 2020-08-10 RX ORDER — NEOSTIGMINE METHYLSULFATE 1 MG/ML
INJECTION, SOLUTION INTRAVENOUS AS NEEDED
Status: DISCONTINUED | OUTPATIENT
Start: 2020-08-10 | End: 2020-08-10 | Stop reason: HOSPADM

## 2020-08-10 RX ORDER — ROCURONIUM BROMIDE 10 MG/ML
INJECTION, SOLUTION INTRAVENOUS AS NEEDED
Status: DISCONTINUED | OUTPATIENT
Start: 2020-08-10 | End: 2020-08-10 | Stop reason: HOSPADM

## 2020-08-10 RX ORDER — MIDAZOLAM HYDROCHLORIDE 1 MG/ML
2 INJECTION, SOLUTION INTRAMUSCULAR; INTRAVENOUS
Status: DISCONTINUED | OUTPATIENT
Start: 2020-08-10 | End: 2020-08-10 | Stop reason: HOSPADM

## 2020-08-10 RX ORDER — GABAPENTIN 300 MG/1
300 CAPSULE ORAL ONCE
Status: COMPLETED | OUTPATIENT
Start: 2020-08-10 | End: 2020-08-10

## 2020-08-10 RX ORDER — SODIUM CHLORIDE 0.9 % (FLUSH) 0.9 %
5-40 SYRINGE (ML) INJECTION AS NEEDED
Status: DISCONTINUED | OUTPATIENT
Start: 2020-08-10 | End: 2020-08-10 | Stop reason: HOSPADM

## 2020-08-10 RX ORDER — HYDROMORPHONE HYDROCHLORIDE 2 MG/ML
0.5 INJECTION, SOLUTION INTRAMUSCULAR; INTRAVENOUS; SUBCUTANEOUS
Status: DISCONTINUED | OUTPATIENT
Start: 2020-08-10 | End: 2020-08-10 | Stop reason: HOSPADM

## 2020-08-10 RX ADMIN — DEXAMETHASONE SODIUM PHOSPHATE 10 MG: 10 INJECTION INTRAMUSCULAR; INTRAVENOUS at 11:10

## 2020-08-10 RX ADMIN — LIDOCAINE HYDROCHLORIDE 100 MG: 20 INJECTION, SOLUTION EPIDURAL; INFILTRATION; INTRACAUDAL; PERINEURAL at 10:51

## 2020-08-10 RX ADMIN — PHENYLEPHRINE HYDROCHLORIDE 200 MCG: 10 INJECTION INTRAVENOUS at 11:35

## 2020-08-10 RX ADMIN — PROPOFOL 160 MG: 10 INJECTION, EMULSION INTRAVENOUS at 10:51

## 2020-08-10 RX ADMIN — PHENYLEPHRINE HYDROCHLORIDE 100 MCG: 10 INJECTION INTRAVENOUS at 11:00

## 2020-08-10 RX ADMIN — PHENYLEPHRINE HYDROCHLORIDE 200 MCG: 10 INJECTION INTRAVENOUS at 11:09

## 2020-08-10 RX ADMIN — PHENYLEPHRINE HYDROCHLORIDE 200 MCG: 10 INJECTION INTRAVENOUS at 11:50

## 2020-08-10 RX ADMIN — LABETALOL HYDROCHLORIDE 15 MG: 5 INJECTION INTRAVENOUS at 12:19

## 2020-08-10 RX ADMIN — PHENYLEPHRINE HYDROCHLORIDE 200 MCG: 10 INJECTION INTRAVENOUS at 11:20

## 2020-08-10 RX ADMIN — PHENYLEPHRINE HYDROCHLORIDE 200 MCG: 10 INJECTION INTRAVENOUS at 11:11

## 2020-08-10 RX ADMIN — PHENYLEPHRINE HYDROCHLORIDE 200 MCG: 10 INJECTION INTRAVENOUS at 11:48

## 2020-08-10 RX ADMIN — ROCURONIUM BROMIDE 10 MG: 10 INJECTION, SOLUTION INTRAVENOUS at 11:11

## 2020-08-10 RX ADMIN — PHENYLEPHRINE HYDROCHLORIDE 100 MCG: 10 INJECTION INTRAVENOUS at 11:43

## 2020-08-10 RX ADMIN — SODIUM CHLORIDE, SODIUM LACTATE, POTASSIUM CHLORIDE, AND CALCIUM CHLORIDE: 600; 310; 30; 20 INJECTION, SOLUTION INTRAVENOUS at 11:53

## 2020-08-10 RX ADMIN — ROCURONIUM BROMIDE 40 MG: 10 INJECTION, SOLUTION INTRAVENOUS at 10:51

## 2020-08-10 RX ADMIN — Medication 10 MG: at 11:09

## 2020-08-10 RX ADMIN — PHENYLEPHRINE HYDROCHLORIDE 100 MCG: 10 INJECTION INTRAVENOUS at 11:04

## 2020-08-10 RX ADMIN — SODIUM CHLORIDE, SODIUM LACTATE, POTASSIUM CHLORIDE, AND CALCIUM CHLORIDE 150 ML/HR: 600; 310; 30; 20 INJECTION, SOLUTION INTRAVENOUS at 08:55

## 2020-08-10 RX ADMIN — PHENYLEPHRINE HYDROCHLORIDE 200 MCG: 10 INJECTION INTRAVENOUS at 11:14

## 2020-08-10 RX ADMIN — PHENYLEPHRINE HYDROCHLORIDE 200 MCG: 10 INJECTION INTRAVENOUS at 11:41

## 2020-08-10 RX ADMIN — PHENYLEPHRINE HYDROCHLORIDE 200 MCG: 10 INJECTION INTRAVENOUS at 11:06

## 2020-08-10 RX ADMIN — PHENYLEPHRINE HYDROCHLORIDE 200 MCG: 10 INJECTION INTRAVENOUS at 11:27

## 2020-08-10 RX ADMIN — GABAPENTIN 300 MG: 300 CAPSULE ORAL at 08:54

## 2020-08-10 RX ADMIN — PHENYLEPHRINE HYDROCHLORIDE 200 MCG: 10 INJECTION INTRAVENOUS at 11:55

## 2020-08-10 RX ADMIN — ONDANSETRON 4 MG: 2 INJECTION INTRAMUSCULAR; INTRAVENOUS at 11:52

## 2020-08-10 RX ADMIN — FAMOTIDINE 20 MG: 20 TABLET, FILM COATED ORAL at 08:54

## 2020-08-10 RX ADMIN — ACETAMINOPHEN 1000 MG: 500 TABLET, FILM COATED ORAL at 08:54

## 2020-08-10 RX ADMIN — PHENYLEPHRINE HYDROCHLORIDE 100 MCG: 10 INJECTION INTRAVENOUS at 11:07

## 2020-08-10 RX ADMIN — Medication 10 MG: at 11:03

## 2020-08-10 RX ADMIN — GLYCOPYRROLATE 0.8 MG: 0.2 INJECTION, SOLUTION INTRAMUSCULAR; INTRAVENOUS at 12:08

## 2020-08-10 RX ADMIN — Medication 2 G: at 11:00

## 2020-08-10 RX ADMIN — Medication 3 AMPULE: at 08:54

## 2020-08-10 RX ADMIN — PHENYLEPHRINE HYDROCHLORIDE 100 MCG: 10 INJECTION INTRAVENOUS at 11:44

## 2020-08-10 RX ADMIN — PHENYLEPHRINE HYDROCHLORIDE 200 MCG: 10 INJECTION INTRAVENOUS at 11:29

## 2020-08-10 RX ADMIN — FENTANYL CITRATE 100 MCG: 50 INJECTION INTRAMUSCULAR; INTRAVENOUS at 10:38

## 2020-08-10 RX ADMIN — Medication 5 MG: at 12:08

## 2020-08-10 NOTE — DISCHARGE INSTRUCTIONS
[facility]  [address], [facility], [address] [facility]  [phone]    Postoperative Home Instructions  Lumbar (Back) Surgery    · Showering: You may shower the first day you are home with the dressing on. If the dressing becomes saturated, change it completely to a similar dressing. Leave the steri-strips or glue in place. If you have the brown aquacel dressing, leave it alone for 5 days and then you may remove the dressing. Do not soak in a tub, and use only soap and water on the wound. · Wound Care: A small to moderate amount of reddish drainage on the dressing is normal the first 1-2 days after surgery. If the dressing becomes saturated, change it. Large amounts of clear, watery drainage is not normal and you should call our office immediately. · Signs of Infection: Extreme tenderness at the wound, excessive redness and/or swelling, or ugly yellowish-greenish drainage from the wound. Fever greater than 100.5 may be present. If you think you have a wound infection, call our office immediately. · Driving: You may not begin driving until after your visit to our office for a wound and suture check which is normally 7-10 days after you come home from the hospital.    · Medications: You should take anti-inflammatory medications such as Motrin, Celebrex for 30 days after surgery, every day, on a regular schedule only if prescribed by your physician. The pain medicine prescribed may be taken as needed. You should take a stool softener (Colace) twice a day, drink lots of water and eat high fiber foods to avoid constipation (this is a common problem with pain medicine.)    · Deep Breathing Exercises: Continue to do your incentive spirometry and/or deep breathing exercises to prevent risk of pneumonia. · Smoking: YOU MAY NOT SMOKE! Smoking will interfere with your healing. If you smoke, you may end up with having another surgery or more problems!     · Activity: No heavy lifting for 4 weeks after surgery. This means anything heavier than a coffee cup or newspaper. After 4 weeks, you may gradually begin lifting heavier things. Avoid bending, stooping, or twisting at the waist.  Do not lie on your stomach to sleep. · You may remove your Michael hose when consistently walking. You may do steps and inclines in moderation. · Sexual Relations: You may resume sexual relations 2 weeks after your surgery. · Walking Program: You should begin walking every day on the first day after surgery. Start for short distances, then go a little farther each day. You should eventually walk 1-2 miles every day for the long term. This is very important in your recovery period because walking strengthens the spinal muscles and will help protect your disc and vertebrae. · Symptoms after Surgery: Dont be alarmed if you still have some symptoms after surgery. The nerves often require time to heal after the pressure has been taken off. Be patient, you should see improvement with time. · Follow-up: You will need to call our office for an appointment to see a nurse one week after surgery for a wound check. An appointment will be made then for you to see your surgeon about 4 weeks after surgery. Please call our office if you have any other questions or problems. Listen to your body; it will tell you if you are overdoing it. Use common sense and take care of yourself! After general anesthesia or intravenous sedation, for 24 hours or while taking prescription Narcotics:  · Limit your activities  · A responsible adult needs to be with you for the next 24 hours  · Do not drive and operate hazardous machinery  · Do not make important personal or business decisions  · Do not drink alcoholic beverages  · If you have not urinated within 8 hours after discharge, please contact your surgeon on call.   · If you have sleep apnea and have a CPAP machine, please use it for all naps and sleeping. · Please use caution when taking narcotics and any of your home medications that may cause drowsiness. *  Please give a list of your current medications to your Primary Care Provider. *  Please update this list whenever your medications are discontinued, doses are      changed, or new medications (including over-the-counter products) are added. *  Please carry medication information at all times in case of emergency situations. These are general instructions for a healthy lifestyle:  No smoking/ No tobacco products/ Avoid exposure to second hand smoke  Surgeon General's Warning:  Quitting smoking now greatly reduces serious risk to your health.   Obesity, smoking, and sedentary lifestyle greatly increases your risk for illness  A healthy diet, regular physical exercise & weight monitoring are important for maintaining a healthy lifestyle

## 2020-08-10 NOTE — ANESTHESIA PREPROCEDURE EVALUATION
Anesthetic History   No history of anesthetic complications            Review of Systems / Medical History  Patient summary reviewed and pertinent labs reviewed    Pulmonary    COPD: mild    Sleep apnea  Smoker (quit 1 year)         Neuro/Psych         Psychiatric history     Cardiovascular    Hypertension    Angina (last ntg was 3 days ago.): with exertion      Past MI, CAD, cardiac stents and CABG (5 years ago, after mi, had 7 jumps, stents since.)    Exercise tolerance: <4 METS  Comments: MI/CABG 2014   GI/Hepatic/Renal     GERD: well controlled           Endo/Other      Hypothyroidism: well controlled  Obesity     Other Findings              Physical Exam    Airway  Mallampati: II  TM Distance: > 6 cm  Neck ROM: normal range of motion   Mouth opening: Normal     Cardiovascular    Rhythm: regular  Rate: normal         Dental  No notable dental hx       Pulmonary  Breath sounds clear to auscultation               Abdominal  GI exam deferred       Other Findings            Anesthetic Plan    ASA: 4  Anesthesia type: general    Monitoring Plan: Arterial line      Induction: Intravenous  Anesthetic plan and risks discussed with: Patient and Spouse      Possible cali.

## 2020-08-10 NOTE — ANESTHESIA PROCEDURE NOTES
Arterial Line Placement    Start time: 8/10/2020 10:42 AM  End time: 8/10/2020 10:44 AM  Performed by: Merna Persaud CRNA  Authorized by: Horacio Chairez MD     Pre-Procedure  Indications:  Arterial pressure monitoring and blood sampling  Preanesthetic Checklist: patient identified, risks and benefits discussed, anesthesia consent, site marked, patient being monitored, timeout performed and patient being monitored    Timeout Time: 10:42        Procedure:   Prep:  ChloraPrep  Seldinger Technique?: Yes    Orientation:  Left  Location:  Radial artery  Catheter size:  20 G  Number of attempts:  1  Cont Cardiac Output Sensor: No      Assessment:   Post-procedure:  Line secured and sterile dressing applied  Patient Tolerance:  Patient tolerated the procedure well with no immediate complications  Comment:   Left arm prepped with ChloraPrep, 0.8ml of 1% lidocaine infiltrated at skin, Seldinger technique, good blood return, good waveform.

## 2020-08-10 NOTE — ANESTHESIA POSTPROCEDURE EVALUATION
Procedure(s):  RIGHT L3 4 LAMINECTOMY AND FACETECTOMY. general    Anesthesia Post Evaluation      Multimodal analgesia: multimodal analgesia used between 6 hours prior to anesthesia start to PACU discharge  Patient location during evaluation: bedside  Patient participation: complete - patient participated  Level of consciousness: awake and alert  Pain management: adequate  Airway patency: patent  Anesthetic complications: no  Cardiovascular status: hemodynamically stable  Respiratory status: spontaneous ventilation  Hydration status: euvolemic  Comments: Patient stable and may discharge at this time. Post anesthesia nausea and vomiting:  none  Final Post Anesthesia Temperature Assessment:  Normothermia (36.0-37.5 degrees C)      INITIAL Post-op Vital signs:   Vitals Value Taken Time   /57 8/10/2020  1:01 PM   Temp 36.2 °C (97.2 °F) 8/10/2020 12:27 PM   Pulse 64 8/10/2020  1:02 PM   Resp 15 8/10/2020 12:46 PM   SpO2 95 % 8/10/2020  1:02 PM   Vitals shown include unvalidated device data.

## 2020-08-10 NOTE — BRIEF OP NOTE
Brief Postoperative Note    Patient: Vivien Valdez  YOB: 1965  MRN: [id number]    Date of Procedure: 8/10/2020     Pre-Op Diagnosis: Lumbar stenosis with neurogenic claudication [M48.062]  Spondylolisthesis of lumbar region [M43.16]    Post-Op Diagnosis: SAME     Procedure(s):  RIGHT L3 4 LAMINECTOMY AND FACETECTOMY    Surgeon(s):  Abad Flaherty MD    Surgical Assistant: None    Anesthesia: General     Estimated Blood Loss (mL): MINIMAL    Complications: NONE    Specimens: * No specimens in log *     Implants: * No implants in log *    Drains: * No LDAs found *    Findings: STENOSIS    Electronically Signed by Yanna Dickens MD on 8/10/2020 at 12:01 PM

## 2020-08-10 NOTE — H&P
[address] [place] [address]  HISTORY AND PHYSICAL    Name:  Shemar Reagan  MR#:  [id number]  :  1965  ACCOUNT #:  [de-identified]  ADMIT DATE:  08/10/2020      CHIEF COMPLAINT:  Right lower extremity pain x months. HISTORY OF PRESENT ILLNESS:  A 75-year-old gentleman status post L3-4 transforaminal lumbar interbody fusion with excellent results 2 years ago. He now presents with new-onset right lower extremity pain and weakness refractory to conservative measures. MRI scanning confirmed a stable fusion and new-onset right-sided stenosis, L3-4, causing right L4 nerve root compression. ALLERGIES:  NONE. PAST MEDICAL HISTORY:  Significant for obesity, coronary artery disease, depression, hypertension, GERD, hypercholesterolemia, and sleep apnea. SOCIAL HISTORY:  He is . He does work. He is a former smoker, having quit in 2018. Minimal ethanol consumer. REVIEW OF SYSTEMS:  Negative for shortness of breath, chest pain, or fatigue. PHYSICAL EXAMINATION:  GENERAL:  A well-developed, well-nourished overweight gentleman in significant distress secondary to right lower extremity pain. HEENT:  Unremarkable. Nose and throat clear. CHEST:  Clear bilaterally. CARDIAC:  Regular rate and rhythm. No murmurs or gallops. ABDOMEN:  Soft, benign, nontender. No masses. Bowel sounds positive. Obese. SKIN:  Well-healed lumbar spine incision. EXTREMITIES:  No deformities. NEUROLOGICAL:  Awake, alert, oriented x3. Cranial nerves II through XII intact. Motor strength 5/5 except for the right quadriceps which is weak at 4/5. Reflexes symmetric. Gait and sensation normal.    IMPRESSION:  Lumbar stenosis with neurogenic claudication, L3-4, with right-sided sciatica and radiculopathy. Conservative measures have failed. PLAN:  Right L3-4 laminectomy and facetectomy.   The risks were thoroughly explained and include bleeding, infection, weakness, numbness, persistent pain and weakness, vascular injury, CSF leak, paralysis, heart attack, stroke and death. He understands and agrees to proceed.       MD KENNY [email or web address]  D:  08/10/2020 10:29  T:  08/10/2020 11:29  JOB #:  [id number]

## 2020-08-11 NOTE — OP NOTES
[address] [facility]  OPERATIVE REPORT    Name:  Melissa Paredes  MR#:  [id number]  :  1965  ACCOUNT #:  [de-identified]  DATE OF SERVICE:  08/10/2020    PREOPERATIVE DIAGNOSIS:  Lumbar stenosis with neurogenic claudication, L3-4, right. POSTOPERATIVE DIAGNOSIS:  Lumbar stenosis with neurogenic claudication, L3-4, right. PROCEDURES PERFORMED:  1. Right L3-4 laminectomy, facetectomy and foraminotomy. 2.  Lysis of adhesions. SURGEON:  Azeem Goyal MD    ASSISTANT:  None. ANESTHESIA:  General endotracheal.    COMPLICATIONS:  None. SPECIMENS REMOVED:  None. IMPLANTS:  None. ESTIMATED BLOOD LOSS:  Minimal.    PREPARATION:  ChloraPrep. HISTORY OF PRESENT ILLNESS:  A 54-year gentleman status post L3-4 transforaminal lumbar interbody fusion two years ago with good results. He developed right lower extremity pain, radicular in nature and refractory to aggressive conservative measures. MRI scan was positive for new onset stenosis on the right at L3-4, severe in nature, and he was admitted for surgery as conservative measures have failed. OPERATIVE NOTE:  The patient was brought to the operating room, was carefully placed under general endotracheal anesthesia without complications, carefully turned prone on the Cloward frame and the posterior aspect of the back was shaved and prepped in the usual sterile fashion. The previous incision was outlined and the upper two-thirds were opened with a 15-blade and carried down through the spinous processes. A small seroma was present as this was seen on the MRI scan as well. No signs of infection. Muscles, fascia and scar tissue were dissected in the right subperiosteal plane with cautery and elevators and deep retractors were placed. Lateral lumbar spine x-ray confirmed the instrument pointing at the L3-4 disk space. Next, laminectomy was carried out with 2 and 3 mm Kerrison rongeurs.   Significant bony and ligamentous hypertrophy were present at the L3 and L4. Lamina and facets on this side were removed in a piecemeal fashion to decompress the ligamentum flavum. A ball-tip probe was used to create a plane between the ligamentum flavum and dura. Ligament was removed with a 3 mm Kerrison rongeur in a piecemeal fashion that significantly decompressed the L4 nerve root and dural sac. After completion, no further compression was noted and the nerve was freed. The wound was irrigated until clear. FloSeal was placed. The wound was dry and it was closed. The fascia was closed tightly with interrupted 0 Vicryl. Subcutaneous tissues were closed with interrupted 3-0 Vicryl. Skin was closed with small staples and a pressure dressing was placed. He tolerated the procedure well, was turned supine, awakened, extubated and taken to PACU in stable condition. There were no obvious complications. DISCHARGE INSTRUCTIONS:  Diet:  Regular. Activity:  As tolerated. No heavy lifting, bending or automobile driving. Showers are permissible but no bath. The patient will contact physician if he develops any fever, drainage, swelling, cellulitis or neurological change. Return visit in 10-14 days for wound check and staple removal.  Discharge medications include admission medicines plus Percocet 10/325 q.8 hours p.r.n. DISCHARGE CONDITION:  Satisfactory.       MD KENNY [id number]  D:  08/10/2020 12:18  T:  08/10/2020 22:07  JOB #:  [id number]

## 2020-08-28 PROBLEM — I25.118 CORONARY ARTERY DISEASE WITH STABLE ANGINA PECTORIS (HCC): Status: ACTIVE | Noted: 2020-08-28

## 2020-09-21 ENCOUNTER — HOSPITAL ENCOUNTER (OUTPATIENT)
Dept: PHYSICAL THERAPY | Age: 55
Discharge: HOME | End: 2020-09-21
Payer: COMMERCIAL

## 2020-09-21 DIAGNOSIS — M48.062 LUMBAR STENOSIS WITH NEUROGENIC CLAUDICATION: ICD-10-CM

## 2020-09-21 DIAGNOSIS — M43.16 SPONDYLOLISTHESIS AT L3-L4 LEVEL: ICD-10-CM

## 2020-09-21 DIAGNOSIS — E66.09 CLASS 1 OBESITY DUE TO EXCESS CALORIES WITHOUT SERIOUS COMORBIDITY WITH BODY MASS INDEX (BMI) OF 33.0 TO 33.9 IN ADULT: ICD-10-CM

## 2020-09-21 PROCEDURE — 97530 THERAPEUTIC ACTIVITIES: CPT

## 2020-09-21 PROCEDURE — 97164 PT RE-EVAL EST PLAN CARE: CPT

## 2020-09-21 PROCEDURE — 97110 THERAPEUTIC EXERCISES: CPT

## 2020-09-21 NOTE — THERAPY EVALUATION
Luis Perla  : 1965      Payor: [address] [facility] / Plan: [address] / Product Type: O /  [facility] at [address] [place] [address]. [facility], [address], [facility], [address] [place] [address]  [phone]   [phone]       OUTPATIENT PHYSICAL THERAPY:Initial Assessment 2020      ICD-10: Treatment Diagnosis:   Pain in Right Hip (M25.551)  Stiffness of Right Hip, Not elsewhere classified (M25.651)  Stiffness of Right Knee, Not elsewhere classified (M25.661)  Other abnormalities of gait and mobility (R26.89)  Low Back Pain (M54.5)  Abnormal posture (R29.3)   Spondylolisthesis at L3-L4 level [M43.16]  Lumbar stenosis with neurogenic claudication [M48.062]                 Precautions/Allergies:   Patient has no known allergies. MD Orders: Eval and Treat  MEDICAL/REFERRING DIAGNOSIS:  Spondylolisthesis at L3-L4 level [M43.16]  Class 1 obesity due to excess calories without serious comorbidity with body mass index (BMI) of 33.0 to 33.9 in adult [E66.09, Z68.33]  Lumbar stenosis with neurogenic claudication [M48.062]   DATE OF ONSET: 5 years ago symptoms; most recent surgery: 8/10/20  REFERRING PHYSICIAN: Alexander Vaughan MD  RETURN PHYSICIAN APPOINTMENT: not specified      INITIAL ASSESSMENT:  Mr. Nolan Cisneros presents to physical therapy with functional limitations related to low back and leg pain. He is 6 weeks s/p left right lumbar laminectomy with a previous left lumbar laminectomy 2 years ago. PT evaluation today reveals right knee flexion contracture resulting in hip flexor restriction, asymmetrical pelvic alignment, leg length discrepancy, and altered gait. Along with decreased core strength, these findings most likely contribute to or exacerbated current right groin and left low back and lateral hip pain.       Nolan Cisneros will benefit from skilled physical therapy (medically necessary) to address above deficits affecting participation in basic ADLs and functional mobility/tolerance. Patient will benefit from manual therapeutic techniques (stretching, joint mobilizations, soft tissue mobilization/myofascial release), therapeutic exercises and activities, postural strengthening/education, and comprehensive home exercises program to address current impairments and functional limitations. PROBLEM LIST (Impacting functional limitations):  1. Decreased Strength  2. Decreased ADL/Functional Activities  3. Decreased Transfer Abilities  4. Decreased Ambulation Ability/Technique  5. Increased Pain  6. Decreased Activity Tolerance  7. Decreased Flexibility/Joint Mobility  8. Decreased Whitman with Home Exercise Program INTERVENTIONS PLANNED:  1. Balance Exercise  2. Cryotherapy  3. Electrical Stimulation  4. Gait Training  5. Heat  6. Home Exercise Program (HEP)  7. Manual Therapy  8. Neuromuscular Re-education/Strengthening  9. Range of Motion (ROM)  10. Therapeutic Activites  11. Therapeutic Exercise/Strengthening  12. Transfer Training  13. Lumbar Traction   TREATMENT PLAN:  Effective Dates: 9/21/2020 TO 12/20/2020 (90 days). Frequency/Duration: 2 times a week for 90 Days  GOALS: (Goals have been discussed and agreed upon with patient.)     Short-Term Goals~4 weeks  Goal Met   1. Omar Valdes will be independent with HEP 1.  [] Date:   2. Omar Valdes will participate in LE stretching program to increase flexibility as well as core stabilization exercises to help with maintaining spinal alignment and joint mechanics during ADLs and work related activities. 2.  [] Date:   3. Omar Valdes will report improved sleep consistently over a 1-2 week period. 3.  [] Date:   4. Omar Valdes will participate in LE strengthening program with weights/resistance as appropriate to help with gait, ADLS and work related activities. 4.  [] Date:   5.  Omar Valdes will participate in static and dynamic balance activities to decrease the risk for falls and improve lumbar/pelvic stabilization mechanics. 5.  [] Date:   6. Henrietta Rios will tolerate manual therapy/joint mobilizations/soft tissue to increase ROM and decrease pain    6. [] Date:    Henrietta iRos will demonstrate a 5 degree improvement of right knee extension (or obtain heel lift for right shoe if not possible to improve knee ROM) to increase postural/spinal alignment. Long Term Goals~8 weeks Goal Met   1. Henrietta Rios will demonstrate a 15 point improvement on the Oswestry to show improvement in function. 1.  [] Date:   2. Henrietta Rios will report 0-1/10 pain at rest and during ADLs. 2.  [] Date:   3. Henrietta Rios will demonstrate ability to lift at least 20 lbs from floor with safe lifting techniques, protecting lumbar spine, to enable pt to perform work activities safely. 3.  [] Date:   4. Henrietta Rios will be able to perform SLS >5 seconds bilaterally to help with gait and improve balance 4. [] Date:    Henrietta Rios will ambulate 10 min without increased LE symptoms demonstrating good stabilization and dynamics of lumbar and pelvic region. Outcome Measure: Tool Used: Modified Oswestry Low Back Pain Questionnaire    Score:  Initial: 28/50  Most Recent: X/50 (Date: -- )   Interpretation of Score: Each section is scored on a 0-5 scale, 5 representing the greatest disability. The scores of each section are added together for a total score of 50. Medical Necessity:   · Skilled intervention continues to be required due to above deficits affecting participation in basic ADLs and overall functional tolerance. Reason for Services/Other Comments:  · Patient continues to require skilled intervention due to  above deficits affecting participation in basic ADLs and overall functional tolerance.     Total Treatment Duration: 30 min evaluation, 30 min treatment   PT Patient Time In/Time Out  Time In: 1100  Time Out: 1200    Rehabilitation Potential For Stated Goals: GOOD  Regarding Trent Perla's therapy, I certify that the treatment plan above will be carried out by a therapist or under their direction. Thank you for this referral,  Corrinne Simper, PT                   HISTORY:   History of Present Injury/Illness (Reason for Referral):  Pt states 5 years ago onset low back pain and BLE pain insidiously and worsening over time. MRI 2 years ago showing lumbar stenosis as well as spondylolisthesis. Pt underwent right laminectomy at that time with good results eventually, however, pain began. Pt states that location of pain is slightly different as it wraps around waist to groin and to right LE, occasionally left LE. Lumbar laminectomy Aug 10, 2020 left side. Pt states that he has not experienced much relief of symptoms after this most recent surgery. MD recommends pt to lose weight and referred to physical therapy to improve core strength. -Present symptoms/complaints (on day of evaluation): low back pain intermittent, right groin pain radiating distally down right LE   Pain Scale:  · Worst: 8/10    · Aggravating factors:  side lying left, 2 min walking, weedeating/yardwork, sitting prolonged,   · Relieving factors: lying down on right side with pillow between knees, position change   · Irritability: Medium (Onset of pain is equal to alleviation)      Past Medical History/Comorbidities:   Mr. Marquita Salazar  has a past medical history of Aneurysm ([facility] [address] CAD (coronary artery disease), Chronic pain, Depressive disorder, not elsewhere classified, Essential hypertension, benign, GERD (gastroesophageal reflux disease), Obese, Pure hypercholesterolemia, Sleep apnea, and Thyroid disease.   Mr. Marquita Salazar  has a past surgical history that includes pr colonoscopy flx dx w/collj spec when pfrmd (8/17/2018); colonoscopy (N/A, 8/17/2018); pr cardiac surg procedure unlist (2014); pr cardiac surg procedure unlist (2015); pr abdomen surgery proc unlisted (08/2018); hx lumbar laminectomy (08/10/2020); hx knee arthroscopy (Right, YRS AGO); hx back surgery; and hx back surgery. LBP sx: 2018:  Left L3-4 laminectomy, facetectomy, and diskectomy, L3-4 transforaminal lumbar interbody fusion with expandable Spine Wave cage, autograft bone, OsteoAMP, and bone marrow aspirate,Lateral transverse process fusion, L3-4, with autograft bone  2020: Right L3-4 laminectomy, facetectomy and foraminotomy; Lysis of adhesions. Social History/Living Environment:   Lives with spouse, 1 story home   Prior Level of Function/Work/Activity:    /construction    Previous Treatment Approach  Previous Physical Therapy   Dominant Side:   Right  Other Clinical Tests  MRI Positive for lumbar spodylolithesis, stenosis (prior to surgery)       Active Ambulatory Problems     Diagnosis Date Noted    Essential hypertension, benign     Depressive disorder, not elsewhere classified     Pure hypercholesterolemia     Major depressive disorder with single episode, in partial remission ([address] 05/05/2016    Spondylolisthesis at L3-L4 level 10/09/2018    Lumbar stenosis with neurogenic claudication 10/09/2018    Strain of lumbar region 01/16/2020    Class 1 obesity due to excess calories without serious comorbidity with body mass index (BMI) of 33.0 to 33.9 in adult 01/16/2020    Severe obesity ([address] 03/31/2020    Coronary artery disease with stable angina pectoris ([address] 08/28/2020     Resolved Ambulatory Problems     Diagnosis Date Noted    No Resolved Ambulatory Problems     Past Medical History:   Diagnosis Date    Aneurysm ([address]     CAD (coronary artery disease)     Chronic pain     GERD (gastroesophageal reflux disease)     Obese     Sleep apnea     Thyroid disease      Note: Patient denies any increase of symptoms with cough, sneeze or valsalva.  Patient denies any saddle paresthesia or bowel/bladder deficits. Current Medications:    Current Outpatient Medications:     venlafaxine-SR (EFFEXOR-XR) 150 mg capsule, Take 1 Cap by mouth daily. (Patient taking differently: Take 150 mg by mouth nightly.), Disp: 90 Cap, Rfl: 1    atorvastatin (LIPITOR) 80 mg tablet, Take 1 Tab by mouth daily. , Disp: 90 Tab, Rfl: 1    levothyroxine (SYNTHROID) 50 mcg tablet, Take 1 Tab by mouth Daily (before breakfast). , Disp: 30 Tab, Rfl: 3    isosorbide mononitrate ER (IMDUR) 120 mg CR tablet, Take 120 mg by mouth two (2) times a day., Disp: , Rfl:     ranolazine ER (RANEXA) 500 mg SR tablet, Take 500 mg by mouth., Disp: , Rfl:     amLODIPine (NORVASC) 10 mg tablet, Take 10 mg by mouth two (2) times a day., Disp: , Rfl:     omeprazole (PRILOSEC OTC) 20 mg tablet, Take 1 Tab by mouth daily. , Disp: 30 Tab, Rfl: 6    metoprolol tartrate (LOPRESSOR) 100 mg IR tablet, Take 1 Tab by mouth two (2) times a day., Disp: 60 Tab, Rfl: 6    acetaminophen (TYLENOL EXTRA STRENGTH) 500 mg tablet, Take  by mouth every six (6) hours as needed for Pain., Disp: , Rfl:     lisinopril (PRINIVIL, ZESTRIL) 20 mg tablet, Take  by mouth daily. , Disp: , Rfl:     aspirin delayed-release 81 mg tablet, Take  by mouth daily. , Disp: , Rfl:     nitroglycerin (NITROSTAT) 0.4 mg SL tablet, by SubLINGual route every five (5) minutes as needed for Chest Pain., Disp: , Rfl:     prasugrel (EFFIENT) 10 mg tablet, Take 10 mg by mouth daily. Last dose 11/6/18-  Dr Anne Marie Faustin had MI/CABG in 2014, Disp: , Rfl:       Ambulatory/Rehab Services H2 Model Falls Risk Assessment    Risk Factors:       (1)  Gender [Male] Ability to Rise from Chair:       (0)  Ability to rise in a single movement    Falls Prevention Plan:       No modifications necessary   Total: (5 or greater = High Risk): 1    ©2010 [facility] [address] [place] [address] Patent #[id number].  Federal Law prohibits the replication, distribution or use without written permission from [facility] [employer]       Date Last Reviewed:  9/22/2020   Number of Personal Factors/Comorbidities that affect the Plan of Care: 1-2: MODERATE COMPLEXITY   EXAMINATION:   Observation/Orthostatic Postural Assessment:    · Standing: right IC and PSIS lower than left, Right knee and hip ~10-15 degrees flexion   · Seated: poor posture with decreased lumbar lordotic curvature and increased thoracic kyphotic curvature   · Supine: right knee and hip flexed mild, right LE shorter than left     ·  Surgical site: mild edema, closed, no drainage, normal coloration               ROM:            AROM/PROM         Joint: Eval Date: 9/22/2020  Re-Assess Date:  Re-Assess Date:    Active ROM RIGHT LEFT RIGHT LEFT RIGHT LEFT   Knee Extension -15 0       Knee Flexion [facility]  WFL       Hip extension  Limited  WFL       Hip Abduction                  Lumbar Flexion 50%        Lumbar Extension 25% CS        Lumbar Side-bending 50% 75%       Lumbar Rotation [facility] WF           Strength:     Eval Date: 9/22/2020  Re-Assess Date:  Re-Assess Date:      RIGHT LEFT RIGHT LEFT RIGHT LEFT   Knee Flexion (L5-S2)   4+/5  4+/5           Knee Extension (L3, L4)  5/5 within available range   5/5           Hip Flexion (L1, L2)  4/5  4/5           Hip Extension  4+/5 within available range   4+/5           Hip Abduction (L5, S1)  4+/5  4+/5           Ankle Dorsiflexion (L4)  4+/5  4+/5           Ankle Plantar Flexion (S1-S2)  WFL  WFL               Special Tests:  None performed       Neurological Screen:    RADIATING SYMPTOMS: Yes    Reflex Testing:  Not tested today      Functional Mobility:    Gait: unassisted, decreased heel strike right, decreased right hip extension, early right knee flexion, increased left lateral lumbar flexion, decreased left arm swing   Transfers: sit to stand - slow but independent        Body Structures Involved:  1. Bones  2. Joints  3. Muscles  4.  Ligaments Body Functions Affected:  1. Sensory/Pain  2. Neuromusculoskeletal  3. Movement Related Activities and Participation Affected:  1. Mobility  2.  Self Care   Number of elements that affect the Plan of Care: 3: MODERATE COMPLEXITY   CLINICAL PRESENTATION:   Presentation: Evolving clinical presentation with changing clinical characteristics: MODERATE COMPLEXITY   CLINICAL DECISION MAKING:      Use of outcome tool(s) and clinical judgement create a POC that gives a: Questionable prediction of patient's progress: MODERATE COMPLEXITY     See associated treatment note for treatment provided today      Future Appointments   Date Time Provider Aria Carrillo   9/24/2020 11:00 AM Vero Steen, PT [facility]   9/28/2020 10:00 AM Vero Steen, PT [facility]   10/1/2020 10:00 AM Vero Steen, PT [facility]   10/5/2020 10:00 AM Vero Steen, PT [facility]   10/8/2020 10:00 AM Vero Steen, PT [facility]   10/12/2020 10:00 AM Julia Armenta, DPT [facility]   10/15/2020 10:00 AM Vero Steen, PT [facility]   10/19/2020 10:00 AM Vero Steen, PT [facility]   10/22/2020 10:00 AM Vero Steen, PT [facility]   10/26/2020 10:00 AM Vero Steen, PT [facility]   10/29/2020 10:00 AM Vero Steen, PT [facility]   12/1/2020  8:30 AM Tracie Rios MD [facility]         Angela Ramos, PT

## 2020-09-21 NOTE — PROGRESS NOTES
[place] Perla  : 1965  Payor: [id number] / Plan: [address] / Product Type: PPO /  [address] at [address] Dian De La Paz, [address] [place] [address], [facility], [address] [place] [address] Road  [phone]   [phone]                                                          Dajuan Duong MD      OUTPATIENT PHYSICAL THERAPY: Daily Treatment Note 2020 Visit Count:  1    Tx Diagnosis:  Pain in Right Hip (M25.551)  Stiffness of Right Hip, Not elsewhere classified (M25.651)  Stiffness of Right Knee, Not elsewhere classified (M25.661)  Other abnormalities of gait and mobility (R26.89)  Low Back Pain (M54.5)  Abnormal posture (R29.3)   Spondylolisthesis at L3-L4 level [M43.16]  Lumbar stenosis with neurogenic claudication [M48.062]        Pre-treatment Symptoms/Complaints:  See Initial Eval Dated 20 for more details. Pain: Initial:810 Post Session: 6/10   Medications Last Reviewed:  2020     Updated Objective Findings: See Initial Eval for more details. TREATMENT:   THERAPEUTIC EXERCISE: (15 min)  Exercises per grid below to improve mobility, strength and balance. Required minimal visual, verbal and manual cues to promote proper body alignment and promote proper body posture. Progressed resistance and complexity of movement as indicated. Date:  2020 Date:   Date:     Activity/Exercise Parameters Parameters Parameters   Education HEP, POC, PT goals, anatomy/pathology, use of ice,      Knee extension hang 5 min      Quad set  Left X 10, 5 sec hold       Calf stretching  NV     Stairs  X 2 laps                        THERAPEUTIC ACTIVITY: ( 10 minutes): Activities per gid below to improve functional movement related mobility, strength and balance to improve neuro-muscular carryover to daily functional activities for improving patient's quality of life.  Required visual, verbal and manual cues to promote proper body alignment and promote proper body posture/mechanics. Progressed resistance and complexity of movement as indicated. Date:  9/21/2020 Date:   Date:     Activity/Exercise Parameters Parameters Parameters   walking Focus on heel strike        Posture  Cuing for weight bearing                                                                MANUAL THERAPY: (minutes): Joint mobilization, Soft tissue mobilization was utilized and necessary because of the patient's restricted joint motion and restricted motion of soft tissue mobility. Date  9/21/2020    Technique Used Grade  Level # Time(s) Effect while being performed                                                                 HEP Log Date 1.   QS 9/21/2020   2. Knee hang 9/21/2020   3. Ice  9/21/2020   4. Heel strike with gait     5. Confer Portal  Treatment/Session Summary:    Response to Treatment: Pt demonstrated understanding of POC and initial HEP. No increase in pain or adverse reactions. Communication/Consultation:  POC, HEP, PT goals, Faxed initial evaluation to MD.   Equipment provided today: none today      Recommendations/Intent for next treatment session:   Next visit will focus on Manual Therapy Core Stability Quad strengthening Hip strengthening. Treatment Plan of Care Effective Dates: 9/21/2020 TO 12/21/2020 (90 days).   Frequency/Duration: 2 times a week for 90 Days       Total Treatment Billable Duration:   25 min Rx plus Eval   PT Patient Time In/Time Out  Time In: 1100  Time Out: Hieu Ma PT    Future Appointments   Date Time Provider Aria Carrillo   9/24/2020 11:00 AM Anjel Deshpande, PT [facility]   9/28/2020 10:00 AM [facility]hpande, [facility]   10/1/2020 10:00 AM Anjel Deshpande, PT [facility]   10/5/2020 10:00 AM Anejl Deshpande, PT [facility]   10/8/2020 10:00 AM Anjel Deshpande, PT [facility]   10/12/2020 10:00 AM ARMANDO [facility]   10/15/2020 10:00 AM Sheree Nielsen, PT [facility]   10/19/2020 10:00 AM Sheree Nielsen, PT [facility]   10/22/2020 10:00 AM Sheree Nielsen, PT [facility]   10/26/2020 10:00 AM Sheree Nielsen, PT [facility]   10/29/2020 10:00 AM Sheree Nielsen, PT [facility]   12/1/2020  8:30 AM Andriy Rios MD [facility]

## 2020-09-24 ENCOUNTER — HOSPITAL ENCOUNTER (OUTPATIENT)
Dept: PHYSICAL THERAPY | Age: 55
Discharge: HOME | End: 2020-09-24
Payer: COMMERCIAL

## 2020-09-24 PROCEDURE — 97110 THERAPEUTIC EXERCISES: CPT

## 2020-09-24 NOTE — PROGRESS NOTES
Magnus Perla  : 1965  Payor: [id number] / Plan: [address] / Product Type: PPO /  [address], [id number] [place] [address] Road  [phone]   [phone]                                                          Haile Lora MD      OUTPATIENT PHYSICAL THERAPY: Daily Treatment Note 2020 Visit Count:  2    Tx Diagnosis:  Pain in Right Hip (M25.551)  Stiffness of Right Hip, Not elsewhere classified (M25.651)  Stiffness of Right Knee, Not elsewhere classified (M25.661)  Other abnormalities of gait and mobility (R26.89)  Low Back Pain (M54.5)  Abnormal posture (R29.3)   Spondylolisthesis at L3-L4 level [M43.16]  Lumbar stenosis with neurogenic claudication [M48.062]        Pre-treatment Symptoms/Complaints:  Pt states that he was consistent with HEP and knee was a little sore but did ok overall. Pain of left side low back only currently. Pain: Initial:5/10 Post Session:0 /10   Medications Last Reviewed:  2020     Updated Objective Findings: See Initial Eval for more details. TREATMENT:   THERAPEUTIC EXERCISE: (50 min)  Exercises per grid below to improve mobility, strength and balance. Required minimal visual, verbal and manual cues to promote proper body alignment and promote proper body posture. Progressed resistance and complexity of movement as indicated.      Date:  2020 Date:  20 Date:     Activity/Exercise Parameters Parameters Parameters   Education HEP, POC, PT goals, anatomy/pathology, use of ice,  HEP, POC     Knee extension hang 5 min  Prone     Quad set  Left X 10, 5 sec hold   Standing     Calf stretching  NV 2 x 30 sec     Stairs  X 2 laps      nustep   X 8 min level 4    Prone knee hang   With manual mobilizations x 5 min     Hip flexor stretch   Standing LE on box with quad set     iso multif  5 sec x 2 min    iso hip flex  3 x 5 sec B    Prone ham curls   X 10 left, focus on gentle iliopsoas stretch     clams  NV    TKE  NV    piriformis stretch  NV        THERAPEUTIC ACTIVITY: ( minutes): Activities per gid below to improve functional movement related mobility, strength and balance to improve neuro-muscular carryover to daily functional activities for improving patient's quality of life. Required visual, verbal and manual cues to promote proper body alignment and promote proper body posture/mechanics. Progressed resistance and complexity of movement as indicated. Date:  9/21/2020 Date:   Date:     Activity/Exercise Parameters Parameters Parameters   walking Focus on heel strike        Posture  Cuing for weight bearing                                                              MANUAL THERAPY: (5 minutes): Joint mobilization, Soft tissue mobilization was utilized and necessary because of the patient's restricted joint motion and restricted motion of soft tissue mobility. Date  9/24/2020    Technique Used Grade  Level # Time(s) Effect while being performed   PA's IV- Right prox tibia  prox tib-fib   3 bouts each  Decreased joint stiffness                                                Modalities: to reduce inflammation   · Ice application lumbar-gluteals x 10 min supine with leg wedge              HEP Log Date 1. Standing hip flex stretch  9/21/2020   2. Knee hang QS 9/21/2020   3.   9/21/2020   4. Heel strike with gait     5. iso hip flex    6. MedBridge Portal  Treatment/Session Summary:    Response to Treatment: initiated core strengthening continued knee and hip mobilization. pt demonstrated improved knee extension compared to initial evaluation. CKC TKE challenging for quad control. Some increase of \"bee sting\" type pain left posterior lower flank and gluteal region lingering after iso hip flexion but resolved with ice application.     Communication/Consultation:  None    Equipment provided today: none today Recommendations/Intent for next treatment session:   Next visit will focus on Manual Therapy Core Stability Quad strengthening Hip strengthening. Treatment Plan of Care Effective Dates: 9/21/2020 TO 12/21/2020 (90 days).   Frequency/Duration: 2 times a week for 90 Days       Total Treatment Billable Duration: 60 min Rx    PT Patient Time In/Time Out  Time In: 1100  Time Out: [address], PT    Future Appointments   Date Time Provider Aria Carrillo   9/30/2020 10:00 AM Modestoquinn Laldon, PT [facility]   10/2/2020 11:00 AM Modestoquinn Laldon, PT [facility]   10/5/2020 10:00 AM Modestoquinn Patel, PT [facility]   10/8/2020 10:00 AM Modestoquinn Patel, PT [facility]   10/12/2020 10:00 AM Lawrence Kirkland, DPT [facility]   10/15/2020 10:00 AM Modestoquinn Laldon, PT [facility]   10/19/2020 10:00 AM Modestoquinn Laldon, PT [facility]   10/22/2020 10:00 AM Modesto Bojomardon, PT [facility]   10/26/2020 10:00 AM Modestoquinn Laldon, PT [facility]   10/29/2020 10:00 AM Modesto Bourdon, PT [facility]   12/1/2020  8:30 AM Dontae Rios MD [facility]

## 2020-09-28 ENCOUNTER — APPOINTMENT (OUTPATIENT)
Dept: PHYSICAL THERAPY | Age: 55
End: 2020-09-28
Payer: COMMERCIAL

## 2020-09-30 ENCOUNTER — APPOINTMENT (OUTPATIENT)
Dept: PHYSICAL THERAPY | Age: 55
End: 2020-09-30
Payer: COMMERCIAL

## 2020-10-01 ENCOUNTER — APPOINTMENT (OUTPATIENT)
Dept: PHYSICAL THERAPY | Age: 55
End: 2020-10-01
Payer: COMMERCIAL

## 2020-10-01 ENCOUNTER — HOSPITAL ENCOUNTER (OUTPATIENT)
Dept: PHYSICAL THERAPY | Age: 55
Discharge: HOME | End: 2020-10-01
Payer: COMMERCIAL

## 2020-10-01 PROCEDURE — 97140 MANUAL THERAPY 1/> REGIONS: CPT

## 2020-10-01 PROCEDURE — 97110 THERAPEUTIC EXERCISES: CPT

## 2020-10-01 NOTE — PROGRESS NOTES
Td Raygoza Matiasmbjazmín  : 1965  Payor: [id number] / Plan: [address] / Product Type: PPO /  [address], [address], [id number] [place] [address] Road  [phone]   [phone]                                                          Carmen Figueroa MD      OUTPATIENT PHYSICAL THERAPY: Daily Treatment Note 10/1/2020   Visit Count:  3    Tx Diagnosis:  Pain in Right Hip (M25.551)  Stiffness of Right Hip, Not elsewhere classified (M25.651)  Stiffness of Right Knee, Not elsewhere classified (M25.661)  Other abnormalities of gait and mobility (R26.89)  Low Back Pain (M54.5)  Abnormal posture (R29.3)   Spondylolisthesis at L3-L4 level [M43.16]  Lumbar stenosis with neurogenic claudication [M48.062]        Pre-treatment Symptoms/Complaints:  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 Performing HEP consistently without problems. Pain: Initial:8/10 Post Session: no report of pain    Medications Last Reviewed:  10/1/2020     Updated Objective Findings: See Initial Eval for more details. TREATMENT:   THERAPEUTIC EXERCISE: (40 min)  Exercises per grid below to improve mobility, strength and balance. Required minimal visual, verbal and manual cues to promote proper body alignment and promote proper body posture. Progressed resistance and complexity of movement as indicated.      Date:  2020 Date:  20 Date:  10/1/20   Activity/Exercise Parameters Parameters Parameters   Education HEP, POC, PT goals, anatomy/pathology, use of ice,  HEP, POC  New HEP, posture    Quad set  Left X 10, 5 sec hold   Standing     Calf stretching  NV 2 x 30 sec     Stairs  X 2 laps      nustep   X 8 min level 4 10 min level 6    Prone knee hang   With manual mobilizations x 5 min     Hip flexor stretch   Standing LE on box with quad set     iso multif  5 sec x 2 min    iso hip flex  3 x 5 sec B    Prone ham curls   X 10 left, focus on gentle iliopsoas stretch     clams  NV X 10, 5 sec holds   TKE  NV NV   piriformis stretch  NV 3 x 20 sec right    Marching    2 x 40 ft    Side stepping    2 x 40 ft 0 resist   Lunge steps   2 x 40ft    LTR   X 3 min        THERAPEUTIC ACTIVITY: ( minutes): Activities per gid below to improve functional movement related mobility, strength and balance to improve neuro-muscular carryover to daily functional activities for improving patient's quality of life. Required visual, verbal and manual cues to promote proper body alignment and promote proper body posture/mechanics. Progressed resistance and complexity of movement as indicated. Date:  9/21/2020 Date:   Date:     Activity/Exercise Parameters Parameters Parameters   walking Focus on heel strike        Posture  Cuing for weight bearing                                                              MANUAL THERAPY: (10 minutes): Joint mobilization, Soft tissue mobilization was utilized and necessary because of the patient's restricted joint motion and restricted motion of soft tissue mobility. Date  10/1/2020    Technique Used Grade  Level # Time(s) Effect while being performed   PA's IV- Right prox tibia  prox tib-fib, knee in flexion  3 bouts each  Decreased joint stiffness    AP's  IV Right prox tib, in knee flexion and ext  3 bouts  Decreased joint stiffness    Hip distraction  III Right hip in hook lying   3 bouts  Decreased hip stiffness   Hip medial/lateral  III Right hip in neutral  3 min total  Increased hip mobility                           Modalities: to reduce inflammation    · None today               HEP Log Date 1. Standing hip flex stretch  9/21/2020   2. Knee hang QS 9/21/2020   3. Piriformis stretch  10/1/2020      4. Heel strike with gait  10/1/2020      5. iso hip flex    6. Clamshell  **give HO NV   7.  Side stepping **give LORA Back  ** give LORA CHIN             MedBridge Portal  Treatment/Session Summary:    Response to Treatment: Continued with functional core strengthening and introduced hip stretching to add to HEP. Pt with improved knee extension with gait today but still questionable potential. May try heel lift in right shoe to improve alignment of pelvis and spine. Pt coming tomorrow and will give handout on new exercises then. No c/o's at end of session    Communication/Consultation:  None    Equipment provided today: none today      Recommendations/Intent for next treatment session:   Next visit will focus on Manual Therapy Core Stability Quad strengthening Hip strengthening. Add t-band to side step and piriformis. Treatment Plan of Care Effective Dates: 9/21/2020 TO 12/21/2020 (90 days).   Frequency/Duration: 2 times a week for 90 Days       Total Treatment Billable Duration: 50 min Rx      PT Patient Time In/Time Out  Time In: 1105  Time Out: Hieu Ma PT    Future Appointments   Date Time Provider Aria Carrillo   10/2/2020 11:00 AM Estel Lente, PT [facility]   10/5/2020 10:00 AM Estel Lente, PT [facility]   10/8/2020 10:00 AM Estel Lente, PT [facility]   10/12/2020 10:00 AM Olga Lidia Arcos, DPT [facility]   10/15/2020 10:00 AM Estel Lente, PT [facility]   10/19/2020 10:00 AM Estel Lente, PT [facility]   10/22/2020 10:00 AM Estel Lente, PT [facility]   10/26/2020 10:00 AM Estel Lente, PT [facility]   10/29/2020 10:00 AM Estel Lente, PT [facility]   12/1/2020  8:30 AM Binh Rios MD [facility]

## 2020-10-02 ENCOUNTER — HOSPITAL ENCOUNTER (OUTPATIENT)
Dept: PHYSICAL THERAPY | Age: 55
Discharge: HOME | End: 2020-10-02
Payer: COMMERCIAL

## 2020-10-02 PROCEDURE — 97140 MANUAL THERAPY 1/> REGIONS: CPT

## 2020-10-02 PROCEDURE — 97110 THERAPEUTIC EXERCISES: CPT

## 2020-10-05 ENCOUNTER — HOSPITAL ENCOUNTER (OUTPATIENT)
Dept: PHYSICAL THERAPY | Age: 55
Discharge: HOME | End: 2020-10-05
Payer: COMMERCIAL

## 2020-10-05 PROCEDURE — 97110 THERAPEUTIC EXERCISES: CPT

## 2020-10-08 ENCOUNTER — HOSPITAL ENCOUNTER (OUTPATIENT)
Dept: PHYSICAL THERAPY | Age: 55
Discharge: HOME | End: 2020-10-08
Payer: COMMERCIAL

## 2020-10-08 PROCEDURE — 97140 MANUAL THERAPY 1/> REGIONS: CPT

## 2020-10-08 PROCEDURE — 97110 THERAPEUTIC EXERCISES: CPT

## 2020-10-08 NOTE — PROGRESS NOTES
Rehtt Perla  : 1965  Payor: [id number] [place] [address] / Plan: [address] [facility] [address] [facility] [address] / Product Type: O /  [address] [place] [address] at [address] [place] [address]. [address], [address] [place] [address]  [phone]   [phone]                                                          Nghia Zayas MD      OUTPATIENT PHYSICAL THERAPY: Daily Treatment Note 10/8/2020   Visit Count:  6    Tx Diagnosis:  Pain in Right Hip (M25.551)  Stiffness of Right Hip, Not elsewhere classified (M25.651)  Stiffness of Right Knee, Not elsewhere classified (M25.661)  Other abnormalities of gait and mobility (R26.89)  Low Back Pain (M54.5)  Abnormal posture (R29.3)   Spondylolisthesis at L3-L4 level [M43.16]  Lumbar stenosis with neurogenic claudication [M48.062]        Pre-treatment Symptoms/Complaints:   Pt reports that he is doing pretty well with his HEP. Pain: Initial: -7/10  Post Session:no c/o's    Medications Last Reviewed:  10/8/2020     Updated Objective Findings: See Initial Eval for more details. TREATMENT:   THERAPEUTIC EXERCISE: (40 min)  Exercises per grid below to improve mobility, strength and balance. Required minimal visual, verbal and manual cues to promote proper body alignment and promote proper body posture. Progressed resistance and complexity of movement as indicated.      Date:  2020 Date:  20 Date:  10/1/20 10/2/20 10/5/20 10/7/20   Activity/Exercise Parameters Parameters Parameters      Education HEP, POC, PT goals, anatomy/pathology, use of ice,  HEP, POC  New HEP, posture  Use of heel lift, new HEP  Heel lift wear   Quad set  Left X 10, 5 sec hold   Standing        Calf stretching  NV 2 x 30 sec        Stairs  X 2 laps         nustep   X 8 min level 4 10 min level 6   10 min level 3 (scifit) 10 min level 3.5 (sci fit )   Prone knee hang   With manual mobilizations x 5 min        Hip flexor stretch   Standing LE on box with quad set   Manually      iso multif  5 sec x 2 min       iso hip flex  3 x 5 sec B       Prone hip ext with knee at 90    X 7, 5 sec hold      Prone ham curls   X 10 left, focus on gentle iliopsoas stretch   X 10      clams  NV X 10, 5 sec holds HEP     TKE  NV NV SAQ with 10# x 20 reps      piriformis stretch  NV 3 x 20 sec right   Seated 3 sets 3x 10 sec holds B Seated 2 x 20 sec B    Marching    2 x 40 ft       Side stepping    2 x 40 ft 0 resist  YTB 2 x 40ft  NV   Lunge steps   2 x 40ft       LTR   X 3 min  X 5      Bridges      X 10, 5 sec With resist band next visit     Treadmill     3min 30 sec 1. 9mph   2 min 1.8 mph     Walking      4 X 3 loops  4# x 4 loops ([facility] [id number])    Sciatic nerve glide      slump test with head node x 10    Hip add     Ball squeeze seat 3 x 10 sec (2 sets)    Sit to stand      With 2# wand 2 x 10  3# wand 2 x 10                 THERAPEUTIC ACTIVITY: ( minutes): Activities per gid below to improve functional movement related mobility, strength and balance to improve neuro-muscular carryover to daily functional activities for improving patient's quality of life. Required visual, verbal and manual cues to promote proper body alignment and promote proper body posture/mechanics. Progressed resistance and complexity of movement as indicated. Date:  9/21/2020 Date:   Date:     Activity/Exercise Parameters Parameters Parameters   walking Focus on heel strike        Posture  Cuing for weight bearing                                                              MANUAL THERAPY: ( 15 minutes): Joint mobilization, Soft tissue mobilization was utilized and necessary because of the patient's restricted joint motion and restricted motion of soft tissue mobility.         Date  10/8/2020       Technique Used Grade  Level # Time(s) Effect while being performed   PA's   Not today     AP's  IV Right prox tib, in knee flexion and ext  3 bouts  Decreased joint stiffness    Hip distraction Right, in supine, long distraction  2 bouts  Increased joint space, decreased stiffness    Hip medial/lateral  III Right hip, knee over bolster (25 deg flex) 2 bouts  Decrease tension surround joint    STM/MFR    not today     Deep MFR/ trigger point release   Right iliopsoas, supine, knee over bolster 10 min  Decrease muscle muscle spasm             Modalities: to reduce inflammation    · None today               HEP Log Date 1. Standing hip flex stretch  9/21/2020   2. Knee hang QS 9/21/2020   3. Piriformis stretch  10/8/2020      4. Heel strike with gait  10/8/2020      5. iso hip flex 10/8/2020    6. Clamshell  10/8/2020     7. Piriformis     Marching     Bridges  10/8/2020          MedBridge Portal  Treatment/Session Summary:    Response to Treatment: Continued to focus on increasing walking tolerance and endurance- intervals of walking with free weights and seated hip stretching, joint distraction. Able to perform 4 laps each round today prior to pain limiting. Pain seemed to increased of right anterior hip and thigh during ambulation and resolve quickly with rest. Encouragement needed during walking for decreasing downward gaze and lateral trunk movement as well as to engage abdominals. Manual to release to iliopsoas due to symptom location and nature. Gait pattern and alignment improved with heel lift and post manual.        Communication/Consultation:  Pt advised of wear schedule heel lift and discussed adjusting height if adverse reaction over weekend. Equipment provided today: Heel lift      Recommendations/Intent for next treatment session:   Next visit will focus on Manual Therapy Core Stability Quad strengthening Hip strengthening. Add t-band to bridges, continue with hip and core strengthening and walking tolerance. Manual hip/spine/knee mobs and DTM as needed         Treatment Plan of Care Effective Dates: 9/21/2020 TO 12/21/2020 (90 days).   Frequency/Duration: 2 times a week for 90 Days Total Treatment Billable Duration: 55 min Rx      PT Patient Time In/Time Out  Time In: 1000  Time Out: 43 [facility], PT    Future Appointments   Date Time Provider Aria Carrillo   10/12/2020 10:00 AM Azra Page, DPT [facility]   10/15/2020 10:00 AM Suhail Beard, PT [facility]   10/19/2020 10:00 AM Suhail Beard, PT [facility]   10/22/2020 10:00 AM Suhail Beard, PT [facility]   10/26/2020 10:00 AM Suhail Beard, PT [facility]   10/29/2020 10:00 AM Suhail Beard, PT [facility]   12/1/2020  8:30 AM Benton Rios MD [facility]

## 2020-10-12 ENCOUNTER — HOSPITAL ENCOUNTER (OUTPATIENT)
Dept: PHYSICAL THERAPY | Age: 55
Discharge: HOME | End: 2020-10-12
Payer: COMMERCIAL

## 2020-10-13 ENCOUNTER — APPOINTMENT (OUTPATIENT)
Dept: PHYSICAL THERAPY | Age: 55
End: 2020-10-13
Payer: COMMERCIAL

## 2020-10-15 ENCOUNTER — HOSPITAL ENCOUNTER (OUTPATIENT)
Dept: PHYSICAL THERAPY | Age: 55
Discharge: HOME | End: 2020-10-15
Payer: COMMERCIAL

## 2020-10-15 PROCEDURE — 97140 MANUAL THERAPY 1/> REGIONS: CPT

## 2020-10-15 PROCEDURE — 97110 THERAPEUTIC EXERCISES: CPT

## 2020-10-15 NOTE — PROGRESS NOTES
Yazmin Perla  : 1965  Payor: [id number] / Plan: [id number] / Product Type: PPO /  [address], [id number] [place] [address] Road  [phone]   [phone]                                                          Murphy Baptiste MD      OUTPATIENT PHYSICAL THERAPY: Daily Treatment Note 10/15/2020   Visit Count:  7    Tx Diagnosis:  Pain in Right Hip (M25.551)  Stiffness of Right Hip, Not elsewhere classified (M25.651)  Stiffness of Right Knee, Not elsewhere classified (M25.661)  Other abnormalities of gait and mobility (R26.89)  Low Back Pain (M54.5)  Abnormal posture (R29.3)   Spondylolisthesis at L3-L4 level [M43.16]  Lumbar stenosis with neurogenic claudication [M48.062]        Pre-treatment Symptoms/Complaints:   Pt reports that he is doing pretty well with his HEP. Increased pain with a lot of driving this past week. He also states that he has had discomfort under surg scar. Pain: Initial: 8/10  Post Session:4-   Medications Last Reviewed:  10/15/2020     Updated Objective Findings: See Initial Eval for more details. TREATMENT:   THERAPEUTIC EXERCISE: (45 min)  Exercises per grid below to improve mobility, strength and balance. Required minimal visual, verbal and manual cues to promote proper body alignment and promote proper body posture. Progressed resistance and complexity of movement as indicated.      Date:  2020 Date:  20 Date:  10/1/20 10/2/20 10/5/20 10/7/20 10/15/20   Activity/Exercise Parameters Parameters Parameters       Education HEP, POC, PT goals, anatomy/pathology, use of ice,  HEP, POC  New HEP, posture  Use of heel lift, new HEP  Heel lift wear    Quad set  Left X 10, 5 sec hold   Standing      VMO focus, seated    Calf stretching  NV 2 x 30 sec         Stairs  X 2 laps          nustep   X 8 min level 4 10 min level 6   10 min level 3 (scifit) 10 min level 3.5 (sci fit ) 10 min level 3.5    Prone knee hang   With manual mobilizations x 5 min         Hip flexor stretch   Standing LE on box with quad set   Manually       iso multif  5 sec x 2 min        iso hip flex  3 x 5 sec B        Prone hip ext with knee at 90    X 7, 5 sec hold       Prone ham curls   X 10 left, focus on gentle iliopsoas stretch   X 10       clams  NV X 10, 5 sec holds HEP      TKE  NV NV SAQ with 10# x 20 reps    At end of sit to stand    piriformis stretch  NV 3 x 20 sec right   Seated 3 sets 3x 10 sec holds B Seated 2 x 20 sec B     Marching    2 x 40 ft     Step tap on bolster x 1 min    Side stepping    2 x 40 ft 0 resist  YTB 2 x 40ft  NV Modify TB to above knee next session    Lunge steps   2 x 40ft        LTR   X 3 min  X 5       Bridges      X 10, 5 sec With resist band next visit      Treadmill     3min 30 sec 1. 9mph   2 min 1.8 mph      Walking      4 X 3 loops  4# x 4 loops ([id number]  5# x 4 , 8/10    5# x 4 laps (470 ft each  Round)    Sciatic nerve glide      slump test with head node x 10     Hip add     Ball squeeze seat 3 x 10 sec (2 sets)     Sit to stand      With 2# wand 2 x 10  3# wand 2 x 10  3# wand overhead x 10, focus on VMO and glute contraction    Step overs        Over bolster x 10 laterals B        THERAPEUTIC ACTIVITY: (4  minutes): Activities per gid below to improve functional movement related mobility, strength and balance to improve neuro-muscular carryover to daily functional activities for improving patient's quality of life. Required visual, verbal and manual cues to promote proper body alignment and promote proper body posture/mechanics. Progressed resistance and complexity of movement as indicated.      Date:  9/21/2020 Date:  10/15/20 Date:     Activity/Exercise Parameters Parameters Parameters   walking Focus on heel strike  Demo-do: heel strike     Posture  Cuing for weight bearing  Cuing to decrease cervical and thoracic flexion                                                         MANUAL THERAPY: (12 minutes): Joint mobilization, Soft tissue mobilization was utilized and necessary because of the patient's restricted joint motion and restricted motion of soft tissue mobility. Date  10/15/2020       Technique Used Grade  Level # Time(s) Effect while being performed   PA's   Not today     AP's  IV Right prox tib, in knee flexion and ext  Not today  Decreased joint stiffness    Hip distraction   Right, in supine, long distraction  Not today  Increased joint space, decreased stiffness    Hip medial/lateral  III Right hip, knee over bolster (25 deg flex) Not today    STM/MFR Skin glide and rolling  Along lumbar surg scar and surrounding area   x 10 min  Decreased adhesions affecting movement of spine and muscle     Deep MFR/ trigger point release   Right lumbar paraspinal  2 min   Decrease muscle muscle spasm             Modalities: to reduce inflammation    · None today               HEP Log Date 1. Standing hip flex stretch  9/21/2020   2. Knee hang QS 9/21/2020   3. Piriformis stretch  10/15/2020      4. Heel strike with gait  10/15/2020      5. iso hip flex 10/15/2020    6. Clamshell  10/15/2020     7. Piriformis     Marching     Bridges  10/15/2020          [facility] Portal  Treatment/Session Summary:    Response to Treatment:      Pt able to tolerate about 900 ft of walking with one rest break today carrying 10# of free weights, which was progression from last week. Pt continues to be limited by right hip and thigh pain while walking but pain quickly relieved to below baseline with <1 min seated position. Due to c/o localized pain of low back, assessed tissue and found superficial scar adhesions reproducing same symptoms with palpation. Addressed with manual techniques above and improved tissue mobility after completed--may need to perform at next visit (s). Pain reported 4-5/10 after session today. Communication/Consultation:  Discussed gait techniques to avoid catching right toe on carpet. Also advised pt to modify side stepping exercise to tolerate better by securing band just above knee instead of ankle     Equipment provided today:       Recommendations/Intent for next treatment session:   Next visit will focus on Manual Therapy Core Stability Quad strengthening Hip strengthening. Add t-band to bridges, continue with hip and core strengthening and walking tolerance. Manual hip/spine/knee mobs and DTM as needed         Treatment Plan of Care Effective Dates: 9/21/2020 TO 12/21/2020 (90 days).   Frequency/Duration: 2 times a week for 90 Days       Total Treatment Billable Duration: 61 min Rx      PT Patient Time In/Time Out  Time In: 1000  Time Out: [address], PT    Future Appointments   Date Time Provider Aria Carrillo   10/19/2020 10:00 AM Lawence Ant, PT [facility]   10/22/2020 10:00 AM Lawence Ant, PT [facility]   10/26/2020 10:00 AM Lawence Ant, PT [facility]   10/29/2020 10:00 AM Lawence Ant, PT [facility]   12/1/2020  8:30 AM Alayna Rios MD [facility]

## 2020-10-19 ENCOUNTER — HOSPITAL ENCOUNTER (OUTPATIENT)
Dept: PHYSICAL THERAPY | Age: 55
Discharge: HOME | End: 2020-10-19
Payer: COMMERCIAL

## 2020-10-19 PROCEDURE — 97140 MANUAL THERAPY 1/> REGIONS: CPT

## 2020-10-19 PROCEDURE — 97110 THERAPEUTIC EXERCISES: CPT

## 2020-10-19 NOTE — PROGRESS NOTES
Gisella Ann Matiasmbjazmín  : 1965  Payor: [id number] / Plan: [id number] / Product Type: PPO /  [address] Tiffany Rivas., [address] [facility], [id number] [place] [address] Road  [phone]   [phone]                                                          Eleuterio Mendoza MD      OUTPATIENT PHYSICAL THERAPY: Daily Treatment Note 10/19/2020   Visit Count:  8    Tx Diagnosis:  Pain in Right Hip (M25.551)  Stiffness of Right Hip, Not elsewhere classified (M25.651)  Stiffness of Right Knee, Not elsewhere classified (M25.661)  Other abnormalities of gait and mobility (R26.89)  Low Back Pain (M54.5)  Abnormal posture (R29.3)   Spondylolisthesis at L3-L4 level [M43.16]  Lumbar stenosis with neurogenic claudication [M48.062]        Pre-treatment Symptoms/Complaints:   Pt arriving with limp today stating that after helping set up for a birthday party on Friday (running drop cords-bending and walking up and down hill several times dragging cord) pain increased that night and then progressed next day to feeling of dragging B LE (delayed motor). Pt's family suggested to go to hospital but pt refused. Next day (yesterday) states he rested and symptoms improved. Pt did contact MD to inform of this -- seeing MD 10/26 unless needs earlier. Pt states that since he was in pain, he did not do any exercises since last session and reports he forgot to try ice application. Currently pain is moderate and of low back and right groin increasing with weightbearing, no c/o's of numbness, tingling, or delay motor control of legs currently. Pt denies any bowel or bladder changes with symptoms, no falling/tripping/legs giving way with symptoms. Pain: Initial: 8/10  Post Session:reports no signficant pain after ice application    Medications Last Reviewed:  10/19/2020      Updated Objective Findings: See Initial Eval for more details.    Neuro screen:   · DTR's: symmetrical 1+ patella B, 0 achilles B --pt tends to guard so this my not be accurate   · Derm: lateral R thigh, medial calf R mild diminished with light touch     TREATMENT:   THERAPEUTIC EXERCISE: (30 min)  Exercises per grid below to improve mobility, strength and balance. Required minimal visual, verbal and manual cues to promote proper body alignment and promote proper body posture. Progressed resistance and complexity of movement as indicated. Date:  9/21/2020 Date:  9/24/20 Date:  10/1/20 10/2/20 10/5/20 10/7/20 10/15/20 10/19/20    Activity/Exercise Parameters Parameters Parameters        Education HEP, POC, PT goals, anatomy/pathology, use of ice,  HEP, POC  New HEP, posture  Use of heel lift, new HEP  Heel lift wear     Quad set  Left X 10, 5 sec hold   Standing      VMO focus, seated     Calf stretching  NV 2 x 30 sec          Stairs  X 2 laps           nustep   X 8 min level 4 10 min level 6   10 min level 3 (scifit) 10 min level 3.5 (sci fit ) 10 min level 3.5  ----   Prone knee hang   With manual mobilizations x 5 min          Hip flexor stretch   Standing LE on box with quad set   Manually        iso multif  5 sec x 2 min         iso hip flex  3 x 5 sec B         Prone hip ext with knee at 90    X 7, 5 sec hold        Prone ham curls   X 10 left, focus on gentle iliopsoas stretch   X 10        clams  NV X 10, 5 sec holds HEP    Supine hooklying RTB x 10   TKE  NV NV SAQ with 10# x 20 reps    At end of sit to stand  Seated LAQ with focus on TKE with VMO x 10    piriformis stretch  NV 3 x 20 sec right   Seated 3 sets 3x 10 sec holds B Seated 2 x 20 sec B   2 x 20 sec B supine    Marching    2 x 40 ft     Step tap on bolster x 1 min     Side stepping    2 x 40 ft 0 resist  YTB 2 x 40ft  NV Modify TB to above knee next session     Lunge steps   2 x 40ft         LTR   X 3 min  X 5        Bridges      X 10, 5 sec With resist band next visit    X 10   W/ RTB x 5    Treadmill     3min 30 sec 1. 9mph   2 min 1.8 mph Walking      4 X 3 loops  4# x 4 loops ([facility])  5# x 4 , 8/10    5# x 4 laps (470 ft each  Round)  6# x 120 ft, limited by pain    Sciatic nerve glide      slump test with head node x 10      Hip add     Ball squeeze seat 3 x 10 sec (2 sets)      Sit to stand      With 2# wand 2 x 10  3# wand 2 x 10  3# wand overhead x 10, focus on VMO and glute contraction     Step overs        Over bolster x 10 laterals B     Calf raises         X 3 DL (pain increased of right hip)                                        THERAPEUTIC ACTIVITY: (  minutes): Activities per gid below to improve functional movement related mobility, strength and balance to improve neuro-muscular carryover to daily functional activities for improving patient's quality of life. Required visual, verbal and manual cues to promote proper body alignment and promote proper body posture/mechanics. Progressed resistance and complexity of movement as indicated. Date:  9/21/2020 Date:  10/15/20 Date:     Activity/Exercise Parameters Parameters Parameters   walking Focus on heel strike  Demo-do: heel strike     Posture  Cuing for weight bearing  Cuing to decrease cervical and thoracic flexion                                                            MANUAL THERAPY: (15 minutes): Joint mobilization, Soft tissue mobilization was utilized and necessary because of the patient's restricted joint motion and restricted motion of soft tissue mobility.         Date  10/19/2020       Technique Used Grade  Level # Time(s) Effect while being performed   PA's   Not today     AP's  IV Right prox tib, in knee flexion and ext  Not today  Decreased joint stiffness    Hip distraction   Right, in supine, long distraction  3 bouts   Increased joint space, decreased stiffness    Hip medial/lateral  III Right hip, knee over bolster (25 deg flex) 3 bouts  Decrease hip hip joint stiffness    STM/MFR Skin glide and rolling  Along lumbar surg scar and surrounding area  NOT TODAY  Decreased adhesions affecting movement of spine and muscle     Deep MFR/ trigger point release   Right lumbar paraspinal    NOT TODAY Decrease muscle muscle spasm             Modalities: (10 minutes) to reduce inflammation    · Ice application to lower lumbar x 10 min, supine with leg wedge               HEP Log Date 1. Standing hip flex stretch  9/21/2020   2. Knee hang QS 9/21/2020   3. Piriformis stretch  10/19/2020      4. Heel strike with gait  10/19/2020      5. iso hip flex 10/19/2020    6. Clamshell  10/19/2020     7. Piriformis     Marching     Bridges  10/19/2020          [email or web address] Portal  Treatment/Session Summary:    Response to Treatment:  Assessed pt for contraindications to therapy due to new symptoms over weekend. No significant findings (see subjective and objective) but limited activities today due to pain level. Focused on gluteal strength in none weightbearing. Significant encouragement with posture and pattern during ambulation today. Ice application to lower lumbar and gluteals decreased pain--encouraged to perform at home. Communication/Consultation:      Equipment provided today:       Recommendations/Intent for next treatment session:   Next visit will focus on Manual Therapy Core Stability Quad strengthening Hip strengthening. Add t-band to bridges, continue with hip and core strengthening and walking tolerance. Manual hip/spine/knee mobs and DTM as needed         Treatment Plan of Care Effective Dates: 9/21/2020 TO 12/21/2020 (90 days).   Frequency/Duration: 2 times a week for 90 Days       Total Treatment Billable Duration: 45 min Rx      PT Patient Time In/Time Out  Time In: 1000  Time Out: 43 [facility], PT    Future Appointments   Date Time Provider Aria Carrillo   10/22/2020 10:00 AM Sarah [facility]   10/26/2020 10:00 AM Sarah Cain, PT [facility]   10/29/2020 10:00 AM [place] [facility] Stacey Mccormick, PT [facility]   12/1/2020  8:30 AM Clarence Rios MD [facility]

## 2020-10-22 ENCOUNTER — HOSPITAL ENCOUNTER (OUTPATIENT)
Dept: PHYSICAL THERAPY | Age: 55
Discharge: HOME | End: 2020-10-22
Payer: COMMERCIAL

## 2020-10-22 PROCEDURE — 97110 THERAPEUTIC EXERCISES: CPT

## 2020-10-22 PROCEDURE — 97140 MANUAL THERAPY 1/> REGIONS: CPT

## 2020-10-22 NOTE — PROGRESS NOTES
Cierra Perla  : 1965  Payor: [id number] [facility] / Plan: [address] / Product Type: PPO /  [address] [facility] [address], [id number] [place] [address] Road  [phone]   [phone]                                                          Shanna Ball MD      OUTPATIENT PHYSICAL THERAPY: Daily Treatment Note 10/22/2020   Visit Count:  9    Tx Diagnosis:  Pain in Right Hip (M25.551)  Stiffness of Right Hip, Not elsewhere classified (M25.651)  Stiffness of Right Knee, Not elsewhere classified (M25.661)  Other abnormalities of gait and mobility (R26.89)  Low Back Pain (M54.5)  Abnormal posture (R29.3)   Spondylolisthesis at L3-L4 level [M43.16]  Lumbar stenosis with neurogenic claudication [M48.062]        Pre-treatment Symptoms/Complaints:   Pt states that he has been doing really well since last session with minimal pain and no LE sensation or motor changes. Pain: Initial: 2/10  2/10   Medications Last Reviewed:  10/22/2020      Updated Objective Findings: See Initial Eval for more details. Neuro screen:   · DTR's: symmetrical 1+ patella B, 0 achilles B --pt tends to guard so this my not be accurate   · Derm: lateral R thigh, medial calf R mild diminished with light touch     TREATMENT:   THERAPEUTIC EXERCISE: (30 min)  Exercises per grid below to improve mobility, strength and balance. Required minimal visual, verbal and manual cues to promote proper body alignment and promote proper body posture. Progressed resistance and complexity of movement as indicated.      Date:  2020 Date:  20 Date:  10/1/20 10/2/20 10/5/20 10/7/20 10/15/20 10/19/20  10/22/20     Activity/Exercise Parameters Parameters Parameters          Education HEP, POC, PT goals, anatomy/pathology, use of ice,  HEP, POC  New HEP, posture  Use of heel lift, new HEP  Heel lift wear       Quad set  Left X 10, 5 sec hold   Standing      VMO focus, seated Calf stretching  NV 2 x 30 sec            Stairs  X 2 laps             nustep   X 8 min level 4 10 min level 6   10 min level 3 (scifit) 10 min level 3.5 (sci fit ) 10 min level 3.5  - 10 min level 4     Prone knee hang   With manual mobilizations x 5 min            Hip flexor stretch   Standing LE on box with quad set   Manually          iso multif  5 sec x 2 min           iso hip flex  3 x 5 sec B           Prone hip ext with knee at 90    X 7, 5 sec hold          Prone ham curls   X 10 left, focus on gentle iliopsoas stretch   X 10          clams  NV X 10, 5 sec holds HEP    Supine hooklying RTB x 10 Supine RTB with TrA brace     TKE  NV NV SAQ with 10# x 20 reps    At end of sit to stand  Seated LAQ with focus on TKE with VMO x 10      piriformis stretch  NV 3 x 20 sec right   Seated 3 sets 3x 10 sec holds B Seated 2 x 20 sec B   2 x 20 sec B supine      Marching    2 x 40 ft     Step tap on bolster x 1 min       Side stepping    2 x 40 ft 0 resist  YTB 2 x 40ft  NV Modify TB to above knee next session   5 x 2 steps to each side   RTB x 10     Lunge steps   2 x 40ft           LTR   X 3 min  X 5          Bridges      X 10, 5 sec With resist band next visit    X 10   W/ RTB x 5  With ball squeeze (hip add) 2 x 10, 5 sec holds     Treadmill     3min 30 sec 1. 9mph   2 min 1.8 mph         Walking      4 X 3 loops  4# x 4 loops (middle fonckuh-zgnau-krr)  5# x 4 , 8/10    5# x 4 laps (470 ft each  Round)  6# x 120 ft, limited by pain  8# x 410 limited by pain of right hip  8# x 270 ft     Sciatic nerve glide      slump test with head node x 10        Hip add     Ball squeeze seat 3 x 10 sec (2 sets)    With bridges     Sit to stand      With 2# wand 2 x 10  3# wand 2 x 10  3# wand overhead x 10, focus on VMO and glute contraction       Step overs        Over bolster x 10 laterals B       Calf raises         X 3 DL (pain increased of right hip)                                                THERAPEUTIC ACTIVITY: ( minutes): Activities per gid below to improve functional movement related mobility, strength and balance to improve neuro-muscular carryover to daily functional activities for improving patient's quality of life. Required visual, verbal and manual cues to promote proper body alignment and promote proper body posture/mechanics. Progressed resistance and complexity of movement as indicated. Date:  9/21/2020 Date:  10/15/20 Date:     Activity/Exercise Parameters Parameters Parameters   walking Focus on heel strike  Demo-do: heel strike     Posture  Cuing for weight bearing  Cuing to decrease cervical and thoracic flexion                                                            MANUAL THERAPY: (25 minutes): Joint mobilization, Soft tissue mobilization was utilized and necessary because of the patient's restricted joint motion and restricted motion of soft tissue mobility. Date  10/22/2020       Technique Used Grade  Level # Time(s) Effect while being performed   PA's   Not today     AP's  IV Right prox tib, in knee flexion and ext  Not today  Decreased joint stiffness    Hip distraction with rotation    Right, in supine, use of belt   2 x 5 bouts   Increased joint space, decreased stiffness    Hip medial/lateral  III Right hip, knee over bolster (25 deg flex) 3 bouts  Decrease hip hip joint stiffness    STM/MFR Skin glide and rolling  Along lumbar surg scar and surrounding area  NOT TODAY  Decreased adhesions affecting movement of spine and muscle     Deep MFR/ trigger point release   Right lumbar paraspinal    NOT TODAY Decrease muscle muscle spasm             Modalities: (10 minutes) to reduce inflammation    · Ice application to lower lumbar x 10 min, supine with leg wedge               HEP Log Date 1. Standing hip flex stretch  9/21/2020   2. Knee hang QS 9/21/2020   3. Piriformis stretch  10/22/2020      4. Heel strike with gait  10/22/2020      5. iso hip flex 10/22/2020    6. Jaleel  10/22/2020     7. Piriformis     Marching     Bridges  10/22/2020          [facility] Portal  Treatment/Session Summary:    Response to Treatment:  pt with increased tolerance and decreased pain level today verses last session. continued focusing trunk control during gait and activities, added TrA bracing and encouraged practice at home. Mobilized right hip to increase mobility during functional activities/gait. Mobs relieved pain however, returned with > 200 ft of ambulation. Will continue to work on core strength and joint mechanics. Communication/Consultation:   ---   Equipment provided today:  ----     Recommendations/Intent for next treatment session:   Next visit will focus on Manual Therapy Core Stability Quad strengthening Hip strengthening. Add t-band to bridges, continue with hip and core strengthening and walking tolerance. Manual hip/spine/knee mobs and DTM as needed         Treatment Plan of Care Effective Dates: 9/21/2020 TO 12/21/2020 (90 days).   Frequency/Duration: 2 times a week for 90 Days       Total Treatment Billable Duration: 55 min Rx      PT Patient Time In/Time Out  Time In: 1000  Time Out: 43 [facility]    Future Appointments   Date Time Provider Aria Carrillo   10/26/2020 10:00 AM Mayank Spear PT [facility]   10/29/2020 10:00 AM Mayank Spear PT [facility]   12/1/2020  8:30 AM Dylon Rios MD [facility]

## 2020-10-26 ENCOUNTER — HOSPITAL ENCOUNTER (OUTPATIENT)
Dept: PHYSICAL THERAPY | Age: 55
Discharge: HOME | End: 2020-10-26
Payer: COMMERCIAL

## 2020-10-26 PROCEDURE — 97110 THERAPEUTIC EXERCISES: CPT

## 2020-10-26 NOTE — PROGRESS NOTES
Maggie Foss Minda  : 1965  Payor: [facility] / Plan: [address] / Product Type: PPO /  [id number] at [address] [place] [address]. [address] [place] [address] [address], [id number] [place] [address] Road  [phone]   [phone]                                                          Lord Anita MD      OUTPATIENT PHYSICAL THERAPY: Daily Treatment Note 10/26/2020   Visit Count:  10    Tx Diagnosis:  Pain in Right Hip (M25.551)  Stiffness of Right Hip, Not elsewhere classified (M25.651)  Stiffness of Right Knee, Not elsewhere classified (M25.661)  Other abnormalities of gait and mobility (R26.89)  Low Back Pain (M54.5)  Abnormal posture (R29.3)   Spondylolisthesis at L3-L4 level [M43.16]  Lumbar stenosis with neurogenic claudication [M48.062]      Pre-treatment Symptoms/Complaints: pt states nagging of low back over weekend. Did a lot of walking over weekend, 10 min at a time before needed break. Pain: Initial: 4/10  4/10   Medications Last Reviewed:  10/26/2020      Updated Objective Findings: See Initial Eval for more details. GOALS: (Goals have been discussed and agreed upon with patient.)       Short-Term Goals~4 weeks  Goal Met   1. Margarita Lyons will be independent with HEP 1.  [x]? Date:10/26/2020    2. Margarita Lyons will participate in LE stretching program to increase flexibility as well as core stabilization exercises to help with maintaining spinal alignment and joint mechanics during ADLs and work related activities. 2.  [x]? Date:10/26/2020    3. Margarita Lyons will report improved sleep consistently over a 1-2 week period. 3.  [x]? Date:10/26/2020    4. Margarita Lyons will participate in LE strengthening program with weights/resistance as appropriate to help with gait, ADLS and work related activities. 4.  [x]? Date:10/26/2020    5.  Margarita Lyons will participate in static and dynamic balance activities to decrease the risk for falls and improve lumbar/pelvic stabilization mechanics. 5.  [x]? Date:10/26/2020    6. Krystian Bowling will tolerate manual therapy/joint mobilizations/soft tissue to increase ROM and decrease pain     6. [x]? Date:10/26/2020       Krystian Bowling will demonstrate a 5 degree improvement of right knee extension (or obtain heel lift for right shoe if not possible to improve knee ROM) to increase postural/spinal alignment. [x]? Date:10/26/2020              Long Term Goals~8 weeks Goal Met   1. Krystian Bowling will demonstrate a 15 point improvement on the Oswestry to show improvement in function. 1.  []? Date:   2. Krystian Bowling will report 0-1/10 pain at rest and during ADLs. 2.  []? Date:   3. Krystian Bowling will demonstrate ability to lift at least 20 lbs from floor with safe lifting techniques, protecting lumbar spine, to enable pt to perform work activities safely. 3.  []? Date:   4. Krystian Bowling will be able to perform SLS >5 seconds bilaterally to help with gait and improve balance 4. []? Date:     Krystian Bowling will ambulate 10 min without increased LE symptoms demonstrating good stabilization and dynamics of lumbar and pelvic region.                 Neuro screen:   · DTR's: symmetrical 1+ patella B, 0 achilles B --pt tends to guard so this my not be accurate   · Derm: lateral R thigh, medial calf R mild diminished with light touch     TREATMENT:   THERAPEUTIC EXERCISE: (55 min)  Exercises per grid below to improve mobility, strength and balance. Required minimal visual, verbal and manual cues to promote proper body alignment and promote proper body posture. Progressed resistance and complexity of movement as indicated.      Date:  9/21/2020 Date:  9/24/20 Date:  10/1/20 10/2/20 10/5/20 10/7/20 10/15/20 10/19/20  10/22/20  10/26/20   Activity/Exercise Parameters Parameters Parameters          Education HEP, POC, PT goals, anatomy/pathology, use of ice,  HEP, POC  New HEP, posture  Use of heel lift, new HEP  Heel lift wear       Quad set  Left X 10, 5 sec hold   Standing      VMO focus, seated       Calf stretching  NV 2 x 30 sec            Stairs  X 2 laps             nustep   X 8 min level 4 10 min level 6   10 min level 3 (scifit) 10 min level 3.5 (sci fit ) 10 min level 3.5  - 10 min level 4  10 min level 4    Prone knee hang   With manual mobilizations x 5 min            Hip flexor stretch   Standing LE on box with quad set   Manually       Modified pascale stretch - leg off side bed, R LE 3 x 5 knee flex 10sec hold   iso multif  5 sec x 2 min           iso hip flex  3 x 5 sec B           Prone hip ext with knee at 90    X 7, 5 sec hold          Prone ham curls   X 10 left, focus on gentle iliopsoas stretch   X 10          clams  NV X 10, 5 sec holds HEP    Supine hooklying RTB x 10 Supine RTB with TrA brace     TKE  NV NV SAQ with 10# x 20 reps    At end of sit to stand  Seated LAQ with focus on TKE with VMO x 10      piriformis stretch  NV 3 x 20 sec right   Seated 3 sets 3x 10 sec holds B Seated 2 x 20 sec B   2 x 20 sec B supine      Marching    2 x 40 ft     Step tap on bolster x 1 min       Side stepping    2 x 40 ft 0 resist  YTB 2 x 40ft  NV Modify TB to above knee next session   5 x 2 steps to each side   RTB x 10     Lunge steps   2 x 40ft           LTR   X 3 min  X 5          Bridges      X 10, 5 sec With resist band next visit    X 10   W/ RTB x 5  With ball squeeze (hip add) 2 x 10, 5 sec holds  With ball squeeze    Treadmill     3min 30 sec 1. 9mph   2 min 1.8 mph         Walking      4 X 3 loops  4# x 4 loops (middle [id number])  5# x 4 , 8/10    5# x 4 laps (470 ft each  Round)  6# x 120 ft, limited by pain  8# x 410 limited by pain of right hip  8# x 270 ft  8# dumbells (farmers carry) 2 x  550ft      Sciatic nerve glide      slump test with head node x 10        Hip add     Ball squeeze seat 3 x 10 sec (2 sets)    With bridges  [facility] x 10 seated after walking to traction/pelvis  hip    Sit to stand      With 2# wand 2 x 10  3# wand 2 x 10  3# wand overhead x 10, focus on VMO and glute contraction    3 #wand overhead 2 x 10    Step overs        Over bolster x 10 laterals B       Calf raises         X 3 DL (pain increased of right hip)     Seated lumbar flexion           intermitently during rest breaks ~ 5x 10 sec, 3 sets    Shuttle           NV                    THERAPEUTIC ACTIVITY: (  minutes): Activities per gid below to improve functional movement related mobility, strength and balance to improve neuro-muscular carryover to daily functional activities for improving patient's quality of life. Required visual, verbal and manual cues to promote proper body alignment and promote proper body posture/mechanics. Progressed resistance and complexity of movement as indicated. Date:  9/21/2020 Date:  10/15/20 Date:     Activity/Exercise Parameters Parameters Parameters   walking Focus on heel strike  Demo-do: heel strike     Posture  Cuing for weight bearing  Cuing to decrease cervical and thoracic flexion                                                            MANUAL THERAPY: (0 minutes): Joint mobilization, Soft tissue mobilization was utilized and necessary because of the patient's restricted joint motion and restricted motion of soft tissue mobility.         Date  NOT TODAY       Technique Used Grade  Level # Time(s) Effect while being performed   PA's   Not today     AP's  IV Right prox tib, in knee flexion and ext  Not today  Decreased joint stiffness    Hip distraction with rotation    Right, in supine, use of belt   2 x 5 bouts   Increased joint space, decreased stiffness    Hip medial/lateral  III Right hip, knee over bolster (25 deg flex) 3 bouts  Decrease hip hip joint stiffness    STM/MFR Skin glide and rolling  Along lumbar surg scar and surrounding area  NOT TODAY  Decreased adhesions affecting movement of spine and muscle     Deep MFR/ trigger point release   Right lumbar paraspinal    NOT TODAY Decrease muscle muscle spasm             Modalities: (10 minutes) to reduce inflammation    · Ice application to lower lumbar x 10 min, supine with leg wedge               HEP Log Date 1. Standing hip flex stretch  9/21/2020   2. Knee hang QS 9/21/2020   3. Piriformis stretch  10/26/2020      4. Heel strike with gait  10/26/2020      5. iso hip flex 10/26/2020    6. Clamshell  10/26/2020     7. Piriformis     Marching     Bridges  10/26/2020          [facility] Portal  Treatment/Session Summary:    Response to Treatment:  pt continues to improve walking tolerance--able to walk >500 ft today before rest/recovery. Right hip continues to be limiting factor to weightbearing activities. Added modified Perry stretching for home. Encouraged pt to widen TEODORO and to decrease step length during gait to improve walking tolerance/decrease stress on right hip. Pt has met all STG and is progressively working toward LTG. Communication/Consultation:   ---   Equipment provided today:  ----     Recommendations/Intent for next treatment session:   Next visit will focus on Manual Therapy Core Stability Quad strengthening Hip strengthening. Add t-band to bridges, continue with hip and core strengthening and walking tolerance. Manual hip/spine/knee mobs and DTM as needed         Treatment Plan of Care Effective Dates: 9/21/2020 TO 12/21/2020 (90 days).   Frequency/Duration: 2 times a week for 90 Days       Total Treatment Billable Duration: 55 min      PT Patient Time In/Time Out  Time In: 1000  Time Out: 43 [facility], PT    Future Appointments   Date Time Provider Aria Carrillo   10/29/2020 10:00 AM Cathie Gipson PT [facility]   11/2/2020 10:00 AM HALLEY Ledezma [facility]   11/5/2020 10:00 AM HALLEY Ledezma [facility]   11/9/2020 10:00 AM HALLEY [facility]   11/12/2020 10:00 AM Arthur Saldivar, PT [facility]   11/16/2020 10:00 AM Arthur Saldivar, PT [facility]   11/19/2020 10:00 AM Arthur Saldivar, PT [facility]   12/1/2020  8:30 [facility]

## 2020-10-29 ENCOUNTER — HOSPITAL ENCOUNTER (OUTPATIENT)
Dept: PHYSICAL THERAPY | Age: 55
Discharge: HOME | End: 2020-10-29
Payer: COMMERCIAL

## 2020-10-29 NOTE — PROGRESS NOTES
Steph Perla  : 1965  Primary: Edith Levine, Susan. \[facility].\""  Secondary:  [address] at . [address]  [address], [address]  [phone]   [phone]        OUTPATIENT DAILY NOTE    NAME/AGE/GENDER: Dennise Davila is a 60 y.o. male. DATE: 10/29/2020    Mr. Jose Maria Woodward for today's appointment due to work.     Melida Diehl, PT

## 2020-11-02 ENCOUNTER — HOSPITAL ENCOUNTER (OUTPATIENT)
Dept: PHYSICAL THERAPY | Age: 55
Discharge: HOME | End: 2020-11-02
Payer: COMMERCIAL

## 2020-11-02 PROCEDURE — 97110 THERAPEUTIC EXERCISES: CPT

## 2020-11-02 NOTE — PROGRESS NOTES
Gabino Perla  : 1965  Payor: [address] [facility] / Plan: [address] / Product Type: PPO /  [address] at [address]cia [address]iz[address], [address] [place], [address]  [phone]   [phone]                                                          Brna Santana MD      OUTPATIENT PHYSICAL THERAPY: Daily Treatment Note 2020   Visit Count:  11    Tx Diagnosis:  Pain in Right Hip (M25.551)  Stiffness of Right Hip, Not elsewhere classified (M25.651)  Stiffness of Right Knee, Not elsewhere classified (M25.661)  Other abnormalities of gait and mobility (R26.89)  Low Back Pain (M54.5)  Abnormal posture (R29.3)   Spondylolisthesis at L3-L4 level [M43.16]  Lumbar stenosis with neurogenic claudication [M48.062]      Pre-treatment Symptoms/Complaints: pt states doing good today. He missed last appointment due to having to work. MD rescheduled f/u appointment in 2 weeks--would like pt to complete therapy. 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 GOALS: (Goals have been discussed and agreed upon with patient.)       Short-Term Goals~4 weeks  Goal Met   1. Sheron Sarmiento will be independent with HEP 1.  [x]? Date:10/26/2020    2. Sheron Sarmiento will participate in LE stretching program to increase flexibility as well as core stabilization exercises to help with maintaining spinal alignment and joint mechanics during ADLs and work related activities. 2.  [x]? Date:10/26/2020    3. Sheron Sarmiento will report improved sleep consistently over a 1-2 week period. 3.  [x]? Date:10/26/2020    4.  Sheron Sarmiento will participate in LE strengthening program with weights/resistance as appropriate to help with gait, ADLS and work related activities. 4.  [x]? Date:10/26/2020    5. Mayela Sow will participate in static and dynamic balance activities to decrease the risk for falls and improve lumbar/pelvic stabilization mechanics. 5.  [x]? Date:10/26/2020    6. Mayela Sow will tolerate manual therapy/joint mobilizations/soft tissue to increase ROM and decrease pain     6. [x]? Date:10/26/2020       Mayela Sow will demonstrate a 5 degree improvement of right knee extension (or obtain heel lift for right shoe if not possible to improve knee ROM) to increase postural/spinal alignment. [x]? Date:10/26/2020              Long Term Goals~8 weeks Goal Met   1. Mayela Sow will demonstrate a 15 point improvement on the Oswestry to show improvement in function. 1.  []? Date:   2. Mayela Sow will report 0-1/10 pain at rest and during ADLs. 2.  []? Date:   3. Mayela Sow will demonstrate ability to lift at least 20 lbs from floor with safe lifting techniques, protecting lumbar spine, to enable pt to perform work activities safely. 3.  []? Date:   4. Mayela Sow will be able to perform SLS >5 seconds bilaterally to help with gait and improve balance 4. []? Date:     Mayela Sow will ambulate 10 min without increased LE symptoms demonstrating good stabilization and dynamics of lumbar and pelvic region.              Sit to  30 sec:  11  (11/2/20)  Neuro screen:   · DTR's: symmetrical 1+ patella B, 0 achilles B --pt tends to guard so this my not be accurate   · Derm: lateral R thigh, medial calf R mild diminished with light touch     TREATMENT:   THERAPEUTIC EXERCISE: (55 min)  Exercises per grid below to improve mobility, strength and balance. Required minimal visual, verbal and manual cues to promote proper body alignment and promote proper body posture. Progressed resistance and complexity of movement as indicated.      Date:  9/21/2020 Date:  9/24/20 Date:  10/1/20 10/2/20 10/5/20 10/7/20 10/15/20 10/19/20  10/22/20  10/26/20 11/2/20   Activity/Exercise Parameters Parameters Parameters           Education HEP, POC, PT goals, anatomy/pathology, use of ice,  HEP, POC  New HEP, posture  Use of heel lift, new HEP  Heel lift wear     Gait: use of ankle PF to propel to decrease hip and back strain   Quad set  Left X 10, 5 sec hold   Standing      VMO focus, seated        Calf stretching  NV 2 x 30 sec             Stairs  X 2 laps              nustep   X 8 min level 4 10 min level 6   10 min level 3 (scifit) 10 min level 3.5 (sci fit ) 10 min level 3.5  - 10 min level 4  10 min level 4  10 min Level 4    Prone knee hang   With manual mobilizations x 5 min             Hip flexor stretch   Standing LE on box with quad set   Manually       Modified pascale stretch - leg off side bed, R LE 3 x 5 knee flex 10sec hold Mod Girish stretch with OP x 5 with knee flex off side of bed     iso multif  5 sec x 2 min            iso hip flex  3 x 5 sec B            Prone hip ext with knee at 90    X 7, 5 sec hold           Prone ham curls   X 10 left, focus on gentle iliopsoas stretch   X 10           clams  NV X 10, 5 sec holds HEP    Supine hooklying RTB x 10 Supine RTB with TrA brace   hooklying RTB x 10    TKE  NV NV SAQ with 10# x 20 reps    At end of sit to stand  Seated LAQ with focus on TKE with VMO x 10       piriformis stretch  NV 3 x 20 sec right   Seated 3 sets 3x 10 sec holds B Seated 2 x 20 sec B   2 x 20 sec B supine       Marching    2 x 40 ft     Step tap on bolster x 1 min        Side stepping    2 x 40 ft 0 resist  YTB 2 x 40ft  NV Modify TB to above knee next session   5 x 2 steps to each side   RTB x 10      Lunge steps   2 x 40ft            SKTC            B 3 x 20 sec    LTR   X 3 min  X 5        X 10 with OP    Bridges      X 10, 5 sec With resist band next visit    X 10   W/ RTB x 5  With ball squeeze (hip add) 2 x 10, 5 sec holds  With ball squeeze  With RTB x 10, 5sec   Treadmill     3min 30 sec 1. 9mph   2 min 1.8 mph          Walking      4 X 3 loops  4# x 4 loops (middle [id number])  5# x 4 , 8/10    5# x 4 laps (470 ft each  Round)  6# x 120 ft, limited by pain  8# x 410 limited by pain of right hip  8# x 270 ft  8# dumbells (farmers carry) 2 x  550ft    1 x 600 ft 8#   1 x 450ft  8#   Sciatic nerve glide      slump test with head node x 10         Hip add     Ball squeeze seat 3 x 10 sec (2 sets)    With bridges  Ball squeeze 5 sec x 10 seated after walking to traction/pelvis  hip  Ball squeeze    Sit to stand      With 2# wand 2 x 10  3# wand 2 x 10  3# wand overhead x 10, focus on VMO and glute contraction    3 #wand overhead 2 x 10  With 8# dumbells x 10  overhead press up   Tactile cuing    Step overs        Over bolster x 10 laterals B        Calf raises         X 3 DL (pain increased of right hip)   2 x 10 wall to guide        Seated lumbar flexion           intermitently during rest breaks ~ 5x 10 sec, 3 sets  -----   Shuttle           NV NV (occupied today)                     THERAPEUTIC ACTIVITY: (  minutes): Activities per gid below to improve functional movement related mobility, strength and balance to improve neuro-muscular carryover to daily functional activities for improving patient's quality of life. Required visual, verbal and manual cues to promote proper body alignment and promote proper body posture/mechanics. Progressed resistance and complexity of movement as indicated.      Date:  9/21/2020 Date:  10/15/20 Date:     Activity/Exercise Parameters Parameters Parameters   walking Focus on heel strike  Demo-do: heel strike     Posture  Cuing for weight bearing  Cuing to decrease cervical and thoracic flexion                                                            MANUAL THERAPY: (0 minutes): Joint mobilization, Soft tissue mobilization was utilized and necessary because of the patient's restricted joint motion and restricted motion of soft tissue mobility. Date  NOT TODAY       Technique Used Grade  Level # Time(s) Effect while being performed   PA's   Not today     AP's  IV Right prox tib, in knee flexion and ext  Not today  Decreased joint stiffness    Hip distraction with rotation    Right, in supine, use of belt   2 x 5 bouts   Increased joint space, decreased stiffness    Hip medial/lateral  III Right hip, knee over bolster (25 deg flex) 3 bouts  Decrease hip hip joint stiffness    STM/MFR Skin glide and rolling  Along lumbar surg scar and surrounding area  NOT TODAY  Decreased adhesions affecting movement of spine and muscle     Deep MFR/ trigger point release   Right lumbar paraspinal    NOT TODAY Decrease muscle muscle spasm             Modalities: (10 minutes) to reduce inflammation    · Ice application to lower lumbar x 10 min, supine with leg wedge               HEP Log Date 1. Standing hip flex stretch  9/21/2020   2. Knee hang QS 9/21/2020   3. Piriformis stretch  11/2/2020      4. Heel strike with gait  11/2/2020      5. iso hip flex 11/2/2020    6. Clamshell  11/2/2020     7. Piriformis     Marching     Bridges  11/2/2020          [facility] Portal  Treatment/Session Summary:    Response to Treatment:  Added calf raises to HEP to encourage use of toe off phase in gait. Discussed this phase of gait to patient advising to incorporate more calf propulsion during ambulation on regular activities. Pt continues to be be limited with ambulation due to right hip and groin pain -- gait pattern significantly deteriorates when pain increases, demonstrating use of momentum and lateral trunk movement to advance LE's. Will continue to focus on walking endurance as tolerated. Communication/Consultation:   ---   Equipment provided today:  ----     Recommendations/Intent for next treatment session:   Next visit will focus on Manual Therapy Core Stability Quad strengthening Hip strengthening.  Add t-band to bridges, continue with hip and core strengthening and walking tolerance. Manual hip/spine/knee mobs and DTM as needed         Treatment Plan of Care Effective Dates: 9/21/2020 TO 12/21/2020 (90 days).   Frequency/Duration: 2 times a week for 90 Days       Total Treatment Billable Duration: 55 min      PT Patient Time In/Time Out  Time In: 1000  Time Out: 43 [facility], PT    Future Appointments   Date Time Provider Aria Sunshinei   11/5/2020 10:00 AM Jenna Damon, PT [facility]   11/9/2020 10:00 AM Jenna Damon, PT [facility]   11/12/2020 10:00 AM Jenna Damon, PT [facility]   11/16/2020 10:00 AM Jenna Damon, PT [facility]   11/19/2020 10:00 AM Jenna Damon, PT [facility]   12/1/2020  8:30 AM Willy Rios MD [facility]

## 2020-11-05 ENCOUNTER — APPOINTMENT (OUTPATIENT)
Dept: PHYSICAL THERAPY | Age: 55
End: 2020-11-05
Payer: COMMERCIAL

## 2020-11-09 ENCOUNTER — HOSPITAL ENCOUNTER (OUTPATIENT)
Dept: PHYSICAL THERAPY | Age: 55
Discharge: HOME | End: 2020-11-09
Payer: COMMERCIAL

## 2020-11-09 PROCEDURE — 97140 MANUAL THERAPY 1/> REGIONS: CPT

## 2020-11-09 PROCEDURE — 97110 THERAPEUTIC EXERCISES: CPT

## 2020-11-09 NOTE — PROGRESS NOTES
Ashlyn Perla  : 1965  Payor: [address] [place] [address] / Plan: [address] / Product Type: PPO /  [address] [place] [address] at [address] [place] [address]. Karla Richardson, [address], [id number] [place] [address] Road  [phone]   [phone]                                                          Artur Patel MD      OUTPATIENT PHYSICAL THERAPY: Daily Treatment Note 2020   Visit Count:  12    Tx Diagnosis:  Pain in Right Hip (M25.551)  Stiffness of Right Hip, Not elsewhere classified (M25.651)  Stiffness of Right Knee, Not elsewhere classified (M25.661)  Other abnormalities of gait and mobility (R26.89)  Low Back Pain (M54.5)  Abnormal posture (R29.3)   Spondylolisthesis at L3-L4 level [M43.16]  Lumbar stenosis with neurogenic claudication [M48.062]      Pre-treatment Symptoms/Complaints: pt states that he pushed his walking over weekend but chest pain caused him to stop-pt reports he has angina frequently but getting worse recently--takes nitro. In addition:  Pt also states that he has been experiencing B lower leg pain and tingling intermtttently since he increased activity. 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 Javan Dumas will be independent with HEP 1.  [x]? Date:10/26/2020    2. Javan Dumas will participate in LE stretching program to increase flexibility as well as core stabilization exercises to help with maintaining spinal alignment and joint mechanics during ADLs and work related activities. 2.  [x]? Date:10/26/2020    3. Javan Dumas will report improved sleep consistently over a 1-2 week period. 3.  [x]? Date:10/26/2020    4.  Gilles Tolbertuse [place] Patrice will participate in LE strengthening program with weights/resistance as appropriate to help with gait, ADLS and work related activities. 4.  [x]? Date:10/26/2020    5. Omaira Yusuf will participate in static and dynamic balance activities to decrease the risk for falls and improve lumbar/pelvic stabilization mechanics. 5.  [x]? Date:10/26/2020    6. Omaira Yusuf will tolerate manual therapy/joint mobilizations/soft tissue to increase ROM and decrease pain     6. [x]? Date:10/26/2020       Omaira Yusuf will demonstrate a 5 degree improvement of right knee extension (or obtain heel lift for right shoe if not possible to improve knee ROM) to increase postural/spinal alignment. [x]? Date:10/26/2020              Long Term Goals~8 weeks Goal Met   1. Omaira Yusuf will demonstrate a 15 point improvement on the Oswestry to show improvement in function. 1.  []? Date:   2. Omaira Yusuf will report 0-1/10 pain at rest and during ADLs. 2.  []? Date:   3. Omaira Yusuf will demonstrate ability to lift at least 20 lbs from floor with safe lifting techniques, protecting lumbar spine, to enable pt to perform work activities safely. 3.  []? Date:   4. Omaira Yusuf will be able to perform SLS >5 seconds bilaterally to help with gait and improve balance 4. []? Date:     Omaira Yusuf will ambulate 10 min without increased LE symptoms demonstrating good stabilization and dynamics of lumbar and pelvic region.              Sit to  30 sec:  11  (11/2/20)  Neuro screen:   · DTR's: symmetrical 1+ patella B, 0 achilles B --pt tends to guard so this my not be accurate   · Derm: lateral R thigh, medial calf R mild diminished with light touch     TREATMENT:   THERAPEUTIC EXERCISE: (30 min)  Exercises per grid below to improve mobility, strength and balance.   Required minimal visual, verbal and manual cues to promote proper body alignment and promote proper body posture. Progressed resistance and complexity of movement as indicated.      Date:  9/21/2020 Date:  9/24/20 Date:  10/1/20 10/2/20 10/5/20 10/7/20 10/15/20 10/19/20  10/22/20  10/26/20 11/2/20 11/9/20    Activity/Exercise Parameters Parameters Parameters            Education HEP, POC, PT goals, anatomy/pathology, use of ice,  HEP, POC  New HEP, posture  Use of heel lift, new HEP  Heel lift wear     Gait: use of ankle PF to propel to decrease hip and back strain    Quad set  Left X 10, 5 sec hold   Standing      VMO focus, seated         Calf stretching  NV 2 x 30 sec              Stairs  X 2 laps               nustep   X 8 min level 4 10 min level 6   10 min level 3 (scifit) 10 min level 3.5 (sci fit ) 10 min level 3.5  - 10 min level 4  10 min level 4  10 min Level 4  10 min level 4    Prone knee hang   With manual mobilizations x 5 min              Hip flexor stretch   Standing LE on box with quad set   Manually       Modified pascale stretch - leg off side bed, R LE 3 x 5 knee flex 10sec hold Mod Girish stretch with OP x 5 with knee flex off side of bed      iso multif  5 sec x 2 min             iso hip flex  3 x 5 sec B             Prone hip ext with knee at 90    X 7, 5 sec hold            Prone ham curls   X 10 left, focus on gentle iliopsoas stretch   X 10         X 10 B    clams  NV X 10, 5 sec holds HEP    Supine hooklying RTB x 10 Supine RTB with TrA brace   hooklying RTB x 10     TKE  NV NV SAQ with 10# x 20 reps    At end of sit to stand  Seated LAQ with focus on TKE with VMO x 10        piriformis stretch  NV 3 x 20 sec right   Seated 3 sets 3x 10 sec holds B Seated 2 x 20 sec B   2 x 20 sec B supine     RTB x 40 ft    Marching    2 x 40 ft     Step tap on bolster x 1 min         Side stepping    2 x 40 ft 0 resist  YTB 2 x 40ft  NV Modify TB to above knee next session   5 x 2 steps to each side   RTB x 10    RTB 2 x 40    Lunge steps   2 x 40ft             SKTC            B 3 x 20 sec LTR   X 3 min  X 5        X 10 with OP     Bridges      X 10, 5 sec With resist band next visit    X 10   W/ RTB x 5  With ball squeeze (hip add) 2 x 10, 5 sec holds  With ball squeeze  With RTB x 10, 5sec    Treadmill     3min 30 sec 1. 9mph   2 min 1.8 mph           Walking      4 X 3 loops  4# x 4 loops (middle [id number])  5# x 4 , 8/10    5# x 4 laps (470 ft each  Round)  6# x 120 ft, limited by pain  8# x 410 limited by pain of right hip  8# x 270 ft  8# dumbells (farmers carry) 2 x  550ft    1 x 600 ft 8#   1 x 450ft  8# 5 x 40ft focus on giat pattern and posture    Sciatic nerve glide      slump test with head node x 10          Hip add     Ball squeeze seat 3 x 10 sec (2 sets)    With bridges  Ball squeeze 5 sec x 10 seated after walking to traction/pelvis  hip  Ball squeeze     Sit to stand      With 2# wand 2 x 10  3# wand 2 x 10  3# wand overhead x 10, focus on VMO and glute contraction    3 #wand overhead 2 x 10  With 8# dumbells x 10  overhead press up   Tactile cuing     Step overs        Over bolster x 10 laterals B         Calf raises         X 3 DL (pain increased of right hip)   2 x 10 wall to guide         Seated lumbar flexion           intermitently during rest breaks ~ 5x 10 sec, 3 sets  -----    Shuttle           NV NV (occupied today)                       THERAPEUTIC ACTIVITY: (  minutes): Activities per gid below to improve functional movement related mobility, strength and balance to improve neuro-muscular carryover to daily functional activities for improving patient's quality of life. Required visual, verbal and manual cues to promote proper body alignment and promote proper body posture/mechanics. Progressed resistance and complexity of movement as indicated.      Date:  9/21/2020 Date:  10/15/20 Date:  11/9/20   Activity/Exercise Parameters Parameters Parameters   walking Focus on heel strike  Demo-do: heel strike Decreased step length, wider TEODORO   Posture  Cuing for weight bearing  Cuing to decrease cervical and thoracic flexion                                                            MANUAL THERAPY: (25 minutes): Joint mobilization, Soft tissue mobilization was utilized and necessary because of the patient's restricted joint motion and restricted motion of soft tissue mobility. Date  NOT TODAY       Technique Used Grade  Level # Time(s) Effect while being performed   PA's IV-- prox sacum, T-L junction  3 bouts  decreased joint stiffness    AP's  ---------- ------------------ Not today  Decreased joint stiffness    Hip distraction with rotation    Right, in supine, use of belt   2 x 5 bouts   Increased joint space, decreased stiffness    Hip medial/lateral  III Right hip, knee bent to 90 deg (prone) 3 bouts  Decrease hip hip joint stiffness    STM/MFR Skin glide and rolling  Along lumbar surg scar and surrounding area    Decreased adhesions affecting movement of spine and muscle     Deep MFR/ trigger point release   Bilateral lumbar paraspinal    Decrease muscle muscle spasm             Modalities: (0 minutes) to reduce inflammation    · Ice application to lower lumbar x 10 min, supine with leg wedge               HEP Log Date 1. Standing hip flex stretch  9/21/2020   2. Knee hang QS 9/21/2020   3. Piriformis stretch  11/9/2020      4. Heel strike with gait  11/9/2020      5. iso hip flex 11/9/2020    6. Clamshell  11/9/2020     7. Piriformis     Marching     Bridges  11/9/2020          Med[facility] Portal  Treatment/Session Summary:    Response to Treatment:  monitored O2 sats, pulse and symptoms throughout session today due to recent symptoms. No discoloration of lower legs where feeling tingling/cramping feeling with exercising. Functionally worked on smaller controlled steps and wider TEODORO during gait today to decrease right hip pain being limiting to ambulation---this seemed to decrease hip pain.    Addressed tissue restriction of lumbar spine to decrease constant tightness of low back--improved mobility of tissue noted with manual treatment today. Encouraged pt to call MD regarding increased angina and lower leg symptoms. Communication/Consultation:   ---   Equipment provided today:  ----     Recommendations/Intent for next treatment session:   Next visit will focus on Manual Therapy Core Stability Quad strengthening Hip strengthening. Add t-band to bridges, continue with hip and core strengthening and walking tolerance. Manual hip/spine/knee mobs and DTM as needed         Treatment Plan of Care Effective Dates: 9/21/2020 TO 12/21/2020 (90 days).   Frequency/Duration: 2 times a week for 90 Days       Total Treatment Billable Duration: 55 min      PT Patient Time In/Time Out  Time In: 1000  Time Out: 43 [facility],     Future Appointments   Date Time Provider Aria Carrillo   11/12/2020 10:00 AM John Abdullahi, PT [facility]   11/16/2020 10:00 AM John Abdullahi, PT [facility]   11/19/2020 10:00 AM John Abdullahi, PT [facility]   12/1/2020  8:30 AM Yuly Rios MD [facility]

## 2020-11-12 ENCOUNTER — HOSPITAL ENCOUNTER (OUTPATIENT)
Dept: PHYSICAL THERAPY | Age: 55
Discharge: HOME | End: 2020-11-12
Payer: COMMERCIAL

## 2020-11-12 NOTE — PROGRESS NOTES
Cayetano Perla  : 1965  Primary: [facility]  Secondary:  [address] at . [address]  [address]. [address], [address] [place] [address]  [phone]   [phone]        OUTPATIENT DAILY NOTE    NAME/AGE/GENDER: Zaira Alcantar is a 60 y.o. male. DATE: 2020    Michael Hawk Alber for today's appointment due to work obligations. Will f/u at next scheduled appointment.      Hakan Lee, PT

## 2020-11-16 ENCOUNTER — HOSPITAL ENCOUNTER (OUTPATIENT)
Dept: PHYSICAL THERAPY | Age: 55
Discharge: HOME | End: 2020-11-16
Payer: COMMERCIAL

## 2020-11-16 PROCEDURE — 97110 THERAPEUTIC EXERCISES: CPT

## 2020-11-16 NOTE — PROGRESS NOTES
Ama Perla  : 1965  Payor: [address] [place] [address] / Plan: [address] / Product Type: PPO /  Yoly Rodriguezf at [address] [facility] [address], [id number] [place] [address] Road  [phone]   [phone]                                                          Parviz White MD      OUTPATIENT PHYSICAL THERAPY: Daily Treatment Note 2020   Visit Count:  13    Tx Diagnosis:  Pain in Right Hip (M25.551)  Stiffness of Right Hip, Not elsewhere classified (M25.651)  Stiffness of Right Knee, Not elsewhere classified (M25.661)  Other abnormalities of gait and mobility (R26.89)  Low Back Pain (M54.5)  Abnormal posture (R29.3)   Spondylolisthesis at L3-L4 level [M43.16]  Lumbar stenosis with neurogenic claudication [M48.062]      Pre-treatment Symptoms/Complaints: pt states that he pushed his walking over weekend but chest pain caused him to stop-pt reports he has angina frequently but getting worse recently--takes nitro. In addition:  Pt also states that he has been experiencing B lower leg pain and tingling intermtttently since he increased activity. Pt states that he went to cardiologist about new angina symptoms and they figured out he has not been taking his BP meds over the last 3 months. Unable to get BP meds yet due to [facility] saying he already got them, MD also unable to get it pushed either. Should begin this week. 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 Denia Ramirez will be independent with HEP 1.  [x]? Date:10/26/2020    2.  Denia Ramirez will participate in LE stretching program to increase flexibility as well as core stabilization exercises to help with maintaining spinal alignment and joint mechanics during ADLs and work related activities. 2.  [x]? Date:10/26/2020    3. Diane Valladares will report improved sleep consistently over a 1-2 week period. 3.  [x]? Date:10/26/2020    4. Diane Valladares will participate in LE strengthening program with weights/resistance as appropriate to help with gait, ADLS and work related activities. 4.  [x]? Date:10/26/2020    5. Diane Valladares will participate in static and dynamic balance activities to decrease the risk for falls and improve lumbar/pelvic stabilization mechanics. 5.  [x]? Date:10/26/2020    6. Diane Valladares will tolerate manual therapy/joint mobilizations/soft tissue to increase ROM and decrease pain     6. [x]? Date:10/26/2020       Diane Valladares will demonstrate a 5 degree improvement of right knee extension (or obtain heel lift for right shoe if not possible to improve knee ROM) to increase postural/spinal alignment. [x]? Date:10/26/2020              Long Term Goals~8 weeks Goal Met   1. Diane Valladares will demonstrate a 15 point improvement on the Oswestry to show improvement in function. 1.  []? Date:   2. Diane Valladares will report 0-1/10 pain at rest and during ADLs. 2.  []? Date:   3. Diane Valladares will demonstrate ability to lift at least 20 lbs from floor with safe lifting techniques, protecting lumbar spine, to enable pt to perform work activities safely. 3.  []? Date:   4. Diane Valladares will be able to perform SLS >5 seconds bilaterally to help with gait and improve balance 4. []?  Date:     Diane Valladares will ambulate 10 min without increased LE symptoms demonstrating good stabilization and dynamics of lumbar and pelvic region.              Sit to  30 sec:  11  (11/2/20)  Neuro screen:   · DTR's: symmetrical 1+ patella B, 0 achilles B --pt tends to guard so this my not be accurate   · Derm: lateral R thigh, medial calf R mild diminished with light touch     TREATMENT:   THERAPEUTIC EXERCISE: (40 min)  Exercises per grid below to improve mobility, strength and balance. Required minimal visual, verbal and manual cues to promote proper body alignment and promote proper body posture. Progressed resistance and complexity of movement as indicated.      Date:  9/21/2020 Date:  9/24/20 Date:  10/1/20 10/2/20 10/5/20 10/7/20 10/15/20 10/19/20  10/22/20  10/26/20 11/2/20 11/9/20  11/16/20   Activity/Exercise Parameters Parameters Parameters             Education HEP, POC, PT goals, anatomy/pathology, use of ice,  HEP, POC  New HEP, posture  Use of heel lift, new HEP  Heel lift wear     Gait: use of ankle PF to propel to decrease hip and back strain     Quad set  Left X 10, 5 sec hold   Standing      VMO focus, seated          Calf stretching  NV 2 x 30 sec               Stairs  X 2 laps                nustep   X 8 min level 4 10 min level 6   10 min level 3 (scifit) 10 min level 3.5 (sci fit ) 10 min level 3.5  - 10 min level 4  10 min level 4  10 min Level 4  10 min level 4  10 min level 5   Prone knee hang   With manual mobilizations x 5 min               Hip flexor stretch   Standing LE on box with quad set   Manually       Modified pascale stretch - leg off side bed, R LE 3 x 5 knee flex 10sec hold Mod Girish stretch with OP x 5 with knee flex off side of bed       iso multif  5 sec x 2 min              iso hip flex  3 x 5 sec B              Prone hip ext with knee at 90    X 7, 5 sec hold             Prone ham curls   X 10 left, focus on gentle iliopsoas stretch   X 10         X 10 B     clams  NV X 10, 5 sec holds HEP    Supine hooklying RTB x 10 Supine RTB with TrA brace   hooklying RTB x 10      TKE  NV NV SAQ with 10# x 20 reps    At end of sit to stand  Seated LAQ with focus on TKE with VMO x 10         piriformis stretch NV 3 x 20 sec right   Seated 3 sets 3x 10 sec holds B Seated 2 x 20 sec B   2 x 20 sec B supine     RTB x 40 ft     Marching    2 x 40 ft     Step tap on bolster x 1 min          Side stepping    2 x 40 ft 0 resist  YTB 2 x 40ft  NV Modify TB to above knee next session   5 x 2 steps to each side   RTB x 10    RTB 2 x 40  RTB 2 x 10    Lunge steps   2 x 40ft              SKTC            B 3 x 20 sec      LTR   X 3 min  X 5        X 10 with OP      Bridges      X 10, 5 sec With resist band next visit    X 10   W/ RTB x 5  With ball squeeze (hip add) 2 x 10, 5 sec holds  With ball squeeze  With RTB x 10, 5sec     Treadmill     3min 30 sec 1. 9mph   2 min 1.8 mph            Walking      4 X 3 loops  4# x 4 loops (middle [id number])  5# x 4 , 8/10    5# x 4 laps (470 ft each  Round)  6# x 120 ft, limited by pain  8# x 410 limited by pain of right hip  8# x 270 ft  8# dumbells (farmers carry) 2 x  550ft    1 x 600 ft 8#   1 x 450ft  8# 5 x 40ft focus on giat pattern and posture  4 x 20 fwd backward RTB    2 x Sciatic nerve glide      slump test with head node x 10           Hip add     Ball squeeze seat 3 x 10 sec (2 sets)    With bridges  Ball squeeze 5 sec x 10 seated after walking to traction/pelvis  hip  Ball squeeze      Sit to stand      With 2# wand 2 x 10  3# wand 2 x 10  3# wand overhead x 10, focus on VMO and glute contraction    3 #wand overhead 2 x 10  With 8# dumbells x 10  overhead press up   Tactile cuing   10# kettle 2 x 10 reps    Step overs        Over bolster x 10 laterals B          Calf raises         X 3 DL (pain increased of right hip)   2 x 10 wall to guide          Seated lumbar flexion           intermitently during rest breaks ~ 5x 10 sec, 3 sets  -----  With ball x 3 min    Shuttle           NV NV (occupied today)                         THERAPEUTIC ACTIVITY: (5  minutes):  Activities per gid below to improve functional movement related mobility, strength and balance to improve neuro-muscular carryover to daily functional activities for improving patient's quality of life. Required visual, verbal and manual cues to promote proper body alignment and promote proper body posture/mechanics. Progressed resistance and complexity of movement as indicated. Date:  9/21/2020 Date:  10/15/20 Date:  11/9/20 11/16/20    Activity/Exercise Parameters Parameters Parameters    walking Focus on heel strike  Demo-do: heel strike Decreased step length, wider TEODORO 5# dumbells  Decreased step length and ETODORO   Posture  Cuing for weight bearing  Cuing to decrease cervical and thoracic flexion                                                                        MANUAL THERAPY: (00 minutes): Joint mobilization, Soft tissue mobilization was utilized and necessary because of the patient's restricted joint motion and restricted motion of soft tissue mobility. Date  NOT TODAY       Technique Used Grade  Level # Time(s) Effect while being performed   PA's IV-- prox sacum, T-L junction  3 bouts  decreased joint stiffness    AP's  ---------- ------------------ Not today  Decreased joint stiffness    Hip distraction with rotation    Right, in supine, use of belt   2 x 5 bouts   Increased joint space, decreased stiffness    Hip medial/lateral  III Right hip, knee bent to 90 deg (prone) 3 bouts  Decrease hip hip joint stiffness    STM/MFR Skin glide and rolling  Along lumbar surg scar and surrounding area    Decreased adhesions affecting movement of spine and muscle     Deep MFR/ trigger point release   Bilateral lumbar paraspinal    Decrease muscle muscle spasm             Modalities: (0 minutes) to reduce inflammation    · Ice application to lower lumbar x 10 min, supine with leg wedge               HEP Log Date 1. Standing hip flex stretch  9/21/2020   2. Knee hang QS 9/21/2020   3. Piriformis stretch  11/16/2020      4.  Heel strike with gait  11/16/2020      5. iso hip flex 11/16/2020    6. Clamshell  11/16/2020     7. Piriformis     Marching     Bridges  11/16/2020          Med[facility] Portal  Treatment/Session Summary:    Response to Treatment:  LB symptoms same through all exercises and may mimic kidney pain pattern ---discussed with pt possible visceral origin and to follow up with MD for precaution. Overall, limited exercise today due to pt's medication subjective report and MD recommendations (per pt). Will resume when meds are reconciled. Communication/Consultation:   ---   Equipment provided today:  ----     Recommendations/Intent for next treatment session:   Next visit will focus on Manual Therapy Core Stability Quad strengthening Hip strengthening. Add t-band to bridges, continue with hip and core strengthening and walking tolerance. Manual hip/spine/knee mobs and DTM as needed         Treatment Plan of Care Effective Dates: 9/21/2020 TO 12/21/2020 (90 days).   Frequency/Duration: 2 times a week for 90 Days       Total Treatment Billable Duration: 45 min    Time in: 10:00  Time out: 10:45       Hkaan Lee, PT    Future Appointments   Date Time Provider Aria Carrillo   11/16/2020 10:00 AM [facility]   11/19/2020 10:00 AM Jenna Damon, PT [facility]   12/1/2020  8:30 AM Willy Rios MD [facility]

## 2020-11-19 ENCOUNTER — HOSPITAL ENCOUNTER (OUTPATIENT)
Dept: PHYSICAL THERAPY | Age: 55
Discharge: HOME | End: 2020-11-19
Payer: COMMERCIAL

## 2020-11-19 PROCEDURE — 97110 THERAPEUTIC EXERCISES: CPT

## 2020-11-19 PROCEDURE — 97140 MANUAL THERAPY 1/> REGIONS: CPT

## 2020-11-19 NOTE — PROGRESS NOTES
Huber Perla  : 1965  Payor: Ashley Gentile / Plan: [address] / Product Type: PPO /  [address] at [address] [place] [address]. [address] [place] [address] [address], [id number] [place] [address] Road  [phone]   [phone]                                                          Dwayne Leal MD      OUTPATIENT PHYSICAL THERAPY: Daily Treatment Note 2020   Visit Count:  14    Tx Diagnosis:  Pain in Right Hip (M25.551)  Stiffness of Right Hip, Not elsewhere classified (M25.651)  Stiffness of Right Knee, Not elsewhere classified (M25.661)  Other abnormalities of gait and mobility (R26.89)  Low Back Pain (M54.5)  Abnormal posture (R29.3)   Spondylolisthesis at L3-L4 level [M43.16]  Lumbar stenosis with neurogenic claudication [M48.062]      Pre-treatment Symptoms/Complaints:   pt states that he is still trying to get meds worked out with pharmacy so has not take beta blocker over last 3 months. Low back pain continues to be present but less than last session--hip seems to be most limiting and painful. 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 Shelbi Wisdom will be independent with HEP 1.  [x]? Date:10/26/2020    2. Shelbi Wisdom will participate in LE stretching program to increase flexibility as well as core stabilization exercises to help with maintaining spinal alignment and joint mechanics during ADLs and work related activities. 2.  [x]? Date:10/26/2020    3. Shelbi Wisdom will report improved sleep consistently over a 1-2 week period. 3.  [x]? Date:10/26/2020    4.  Shelbi Wisdom will participate in LE strengthening program with weights/resistance as appropriate to help with gait, ADLS and work related activities. 4.  [x]? Date:10/26/2020    5. Mayela Sow will participate in static and dynamic balance activities to decrease the risk for falls and improve lumbar/pelvic stabilization mechanics. 5.  [x]? Date:10/26/2020    6. Mayela Sow will tolerate manual therapy/joint mobilizations/soft tissue to increase ROM and decrease pain     6. [x]? Date:10/26/2020       Mayela Sow will demonstrate a 5 degree improvement of right knee extension (or obtain heel lift for right shoe if not possible to improve knee ROM) to increase postural/spinal alignment. [x]? Date:10/26/2020              Long Term Goals~8 weeks Goal Met   1. Mayela Sow will demonstrate a 15 point improvement on the Oswestry to show improvement in function. 1.  []? Date:   2. Mayela Sow will report 0-1/10 pain at rest and during ADLs. 2.  []? Date:   3. Mayela Sow will demonstrate ability to lift at least 20 lbs from floor with safe lifting techniques, protecting lumbar spine, to enable pt to perform work activities safely. 3.  []? Date:   4. Mayela Sow will be able to perform SLS >5 seconds bilaterally to help with gait and improve balance 4. []? Date:     Mayela Sow will ambulate 10 min without increased LE symptoms demonstrating good stabilization and dynamics of lumbar and pelvic region.              Sit to  30 sec:  11  (11/2/20)  Neuro screen:   · DTR's: symmetrical 1+ patella B, 0 achilles B --pt tends to guard so this my not be accurate   · Derm: lateral R thigh, medial calf R mild diminished with light touch     TREATMENT:   THERAPEUTIC EXERCISE: (40 min)  Exercises per grid below to improve mobility, strength and balance. Required minimal visual, verbal and manual cues to promote proper body alignment and promote proper body posture.   Progressed resistance and complexity of movement as indicated.      Date:  9/24/20 Date:  10/1/20 10/2/20 10/5/20 10/7/20 10/15/20 10/19/20  10/22/20  10/26/20 11/2/20 11/9/20  11/16/20 11/19/20   Activity/Exercise Parameters Parameters              Education HEP, POC  New HEP, posture  Use of heel lift, new HEP  Heel lift wear     Gait: use of ankle PF to propel to decrease hip and back strain      Quad set  Standing      VMO focus, seated           Calf stretching  2 x 30 sec                Stairs                 nustep  X 8 min level 4 10 min level 6   10 min level 3 (scifit) 10 min level 3.5 (sci fit ) 10 min level 3.5  - 10 min level 4  10 min level 4  10 min Level 4  10 min level 4  10 min level 5 10 min level 4   Prone knee hang  With manual mobilizations x 5 min                Hip flexor stretch  Standing LE on box with quad set   Manually       Modified pascale stretch - leg off side bed, R LE 3 x 5 knee flex 10sec hold Mod Girish stretch with OP x 5 with knee flex off side of bed        iso multif 5 sec x 2 min               iso hip flex 3 x 5 sec B               Prone hip ext with knee at 90   X 7, 5 sec hold           Prone hip flexor 3 sec x 5    Prone ham curls  X 10 left, focus on gentle iliopsoas stretch   X 10         X 10 B   X 5    clams NV X 10, 5 sec holds HEP    Supine hooklying RTB x 10 Supine RTB with TrA brace   hooklying RTB x 10       TKE NV NV SAQ with 10# x 20 reps    At end of sit to stand  Seated LAQ with focus on TKE with VMO x 10          piriformis stretch NV 3 x 20 sec right   Seated 3 sets 3x 10 sec holds B Seated 2 x 20 sec B   2 x 20 sec B supine     RTB x 40 ft      Marching   2 x 40 ft     Step tap on bolster x 1 min           Side stepping   2 x 40 ft 0 resist  YTB 2 x 40ft  NV Modify TB to above knee next session   5 x 2 steps to each side   RTB x 10    RTB 2 x 40  RTB 2 x 10     Lunge steps  2 x 40ft               SKTC           B 3 x 20 sec       LTR  X 3 min  X 5        X 10 with OP       Bridges X 10, 5 sec With resist band next visit    X 10   W/ RTB x 5  With ball squeeze (hip add) 2 x 10, 5 sec holds  With ball squeeze  With RTB x 10, 5sec      Treadmill    3min 30 sec 1. 9mph   2 min 1.8 mph             Walking     4 X 3 loops  4# x 4 loops (middle [id number]-crl)  5# x 4 , 8/10    5# x 4 laps (470 ft each  Round)  6# x 120 ft, limited by pain  8# x 410 limited by pain of right hip  8# x 270 ft  8# dumbells (farmers carry) 2 x  550ft    1 x 600 ft 8#   1 x 450ft  8# 5 x 40ft focus on giat pattern and posture  4 x 20 fwd backward RTB  7 x 80 ft 5# dumbells     2 x Sciatic nerve glide     slump test with head node x 10            Hip add    Ball squeeze seat 3 x 10 sec (2 sets)    With bridges  Ball squeeze 5 sec x 10 seated after walking to traction/pelvis  hip  Ball squeeze       Sit to stand     With 2# wand 2 x 10  3# wand 2 x 10  3# wand overhead x 10, focus on VMO and glute contraction    3 #wand overhead 2 x 10  With 8# dumbells x 10  overhead press up   Tactile cuing   10# kettle 2 x 10 reps     Step overs       Over bolster x 10 laterals B           Calf raises        X 3 DL (pain increased of right hip)   2 x 10 wall to guide           Seated lumbar flexion          intermitently during rest breaks ~ 5x 10 sec, 3 sets  -----  With ball x 3 min     Shuttle          NV NV (occupied today)      DL             5# bar x 20    hinges             X 15 with dowel                                        THERAPEUTIC ACTIVITY: (  minutes): Activities per gid below to improve functional movement related mobility, strength and balance to improve neuro-muscular carryover to daily functional activities for improving patient's quality of life. Required visual, verbal and manual cues to promote proper body alignment and promote proper body posture/mechanics. Progressed resistance and complexity of movement as indicated.      Date:  9/21/2020 Date:  10/15/20 Date:  11/9/20 11/16/20 11/19/20 Activity/Exercise Parameters Parameters Parameters     walking Focus on heel strike  Demo-do: heel strike Decreased step length, wider TEODORO 5# dumbells  Decreased step length and TEODORO 5# dumbells  163/82 BP  108 HR  8/10 hip pain      Posture  Cuing for weight bearing  Cuing to decrease cervical and thoracic flexion                                                                                    MANUAL THERAPY: (15 minutes): Joint mobilization, Soft tissue mobilization was utilized and necessary because of the patient's restricted joint motion and restricted motion of soft tissue mobility. Date  11/19/20       Technique Used Grade  Level # Time(s) Effect while being performed   PA's IV-- prox sacum, T-L junction  3 bouts  decreased joint stiffness    AP's  ---------- ------------------ Not today  Decreased joint stiffness    Hip distraction with rotation    Right, in supine, use of belt   2 x 5 bouts   Increased joint space, decreased stiffness    Hip medial/lateral  III Right hip, knee bent to 90 deg (prone) 3 bouts  Decrease hip hip joint stiffness    STM/MFR Skin glide and rolling  Along lumbar surg scar and surrounding area    Decreased adhesions affecting movement of spine and muscle     Deep MFR/ trigger point release   Bilateral lumbar paraspinal    Decrease muscle muscle spasm             Modalities: (10 minutes) to reduce inflammation    · Ice application to lower lumbar x 10 min, supine with leg wedge               HEP Log Date 1. Standing hip flex stretch  9/21/2020   2. Knee hang QS 9/21/2020   3. Piriformis stretch  11/19/2020      4. Heel strike with gait  11/19/2020      5. iso hip flex 11/19/2020    6. Clamshell  11/19/2020     7. Piriformis     Marching     Bridges  11/19/2020          [facility] Portal  Treatment/Session Summary:    Response to Treatment:   Continued to limit exercise/ endurance training today due to subjective report of no BP medication. Focused on gait pattern and added lifting techniques to decrease stress on back and hips during daily activities. Left hip continues to limit gait--pt to contact MD to follow up to discuss left hip. Addressed scar adhesions of surg sight --improved mobility and decreased pain with this. Ice applied to decrease soreness. Communication/Consultation:   ---   Equipment provided today:  ----     Recommendations/Intent for next treatment session:   Next visit will focus on Manual Therapy Core Stability Quad strengthening Hip strengthening. Add t-band to bridges, continue with hip and core strengthening and walking tolerance. Manual hip/spine/knee mobs and DTM as needed         Treatment Plan of Care Effective Dates: 9/21/2020 TO 12/21/2020 (90 days).   Frequency/Duration: 2 times a week for 90 Days       Total Treatment Billable Duration: 45 min        PT Patient Time In/Time Out  Time In: 1000  Time Out: 43 [facility]    Future Appointments   Date Time Provider Aria Carrillo   11/30/2020  1:45 PM Efrain Osman, PT [facility]   12/1/2020  8:30 AM Gagandeep Aponte MD [facility]   12/3/2020 10:30 AM Efrain Osman, PT [facility]   12/7/2020 11:00 AM Efrain Osman, PT [facility]   12/10/2020 11:00 AM Efrain Osman, PT [facility]   12/14/2020 10:30 AM [facility]   12/17/2020 11:00 AM [facility]   12/21/2020 11:15 AM Tracey Regan, PT [facility]

## 2020-11-30 ENCOUNTER — HOSPITAL ENCOUNTER (OUTPATIENT)
Dept: PHYSICAL THERAPY | Age: 55
Discharge: HOME | End: 2020-11-30
Payer: COMMERCIAL

## 2020-11-30 NOTE — PROGRESS NOTES
Dorita Perla  : 1965  Primary: [facility]  Secondary:  [address] at . Jessica Eden [address] [place] [address] Drive. [address], [address]  [phone]   [phone]        OUTPATIENT DAILY NOTE    NAME/AGE/GENDER: Ann Jackson is a 60 y.o. male. DATE: 2020    Mr. Anna Marie Rivera for today's appointment due to work.     Jorge Luis Heaton, PT

## 2020-12-03 ENCOUNTER — HOSPITAL ENCOUNTER (OUTPATIENT)
Dept: PHYSICAL THERAPY | Age: 55
Discharge: HOME | End: 2020-12-03
Payer: COMMERCIAL

## 2020-12-04 NOTE — PROGRESS NOTES
Kris Perla  : 1965  Primary: [facility]  Secondary:  [address] at . [address]  [address]. [address], [address]  [phone]   [phone]      OUTPATIENT DAILY NOTE    NAME/AGE/GENDER: Lisa Orr is a 60 y.o. male. DATE: 12/3/2020    Mr. Ingrid Martinez for today's appointment due to car trouble.     Baldev Perkins, PT

## 2020-12-07 ENCOUNTER — HOSPITAL ENCOUNTER (OUTPATIENT)
Dept: PHYSICAL THERAPY | Age: 55
Discharge: HOME | End: 2020-12-07
Payer: COMMERCIAL

## 2020-12-07 NOTE — PROGRESS NOTES
Cal Perla  : 1965  Primary: [facility]  Secondary:  [address] at . Jessica Eden [address]  [address], [address]  [phone]   [phone]      OUTPATIENT DAILY NOTE    NAME/AGE/GENDER: Reyna Medel is a 60 y.o. male. DATE: 12/3/2020    Mr. Blaise Cowanbing for today's appointment due to car trouble.     Ameena Degroot, PT

## 2020-12-10 ENCOUNTER — HOSPITAL ENCOUNTER (OUTPATIENT)
Dept: PHYSICAL THERAPY | Age: 55
Discharge: HOME | End: 2020-12-10
Payer: COMMERCIAL

## 2020-12-10 PROCEDURE — 97530 THERAPEUTIC ACTIVITIES: CPT

## 2020-12-10 PROCEDURE — 97110 THERAPEUTIC EXERCISES: CPT

## 2020-12-10 NOTE — PROGRESS NOTES
Cierra Perla  : 1965  Payor: [address] [facility] / Plan: [address] / Product Type: PPO /  [address] at [address], [address]  [phone]   [phone]                                                          Shanna Ball MD      OUTPATIENT PHYSICAL THERAPY: Daily Treatment Note 12/10/2020   Visit Count:  15    Tx Diagnosis:  Pain in Right Hip (M25.551)  Stiffness of Right Hip, Not elsewhere classified (M25.651)  Stiffness of Right Knee, Not elsewhere classified (M25.661)  Other abnormalities of gait and mobility (R26.89)  Low Back Pain (M54.5)  Abnormal posture (R29.3)   Spondylolisthesis at L3-L4 level [M43.16]  Lumbar stenosis with neurogenic claudication [M48.062]      Pre-treatment Symptoms/Complaints: pain at 8 low back, feels like stabbing pain. Manual helped temporarily only . Only other relief laying flat. Cant even stand for 5 min to shave. Doing some exercises at home. 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 David Holloway will be independent with HEP 1.  [x]? Date:10/26/2020    2. David Holloway will participate in LE stretching program to increase flexibility as well as core stabilization exercises to help with maintaining spinal alignment and joint mechanics during ADLs and work related activities. 2.  [x]? Date:10/26/2020    3. David Holloway will report improved sleep consistently over a 1-2 week period. 3.  [x]? Date:10/26/2020    4.  David Holloway will participate in LE strengthening program with weights/resistance as appropriate to help with gait, ADLS and work related activities. 4.  [x]? Date:10/26/2020    5. Dennise Davila will participate in static and dynamic balance activities to decrease the risk for falls and improve lumbar/pelvic stabilization mechanics. 5.  [x]? Date:10/26/2020    6. Dennise Davila will tolerate manual therapy/joint mobilizations/soft tissue to increase ROM and decrease pain     6. [x]? Date:10/26/2020       Dennise Davila will demonstrate a 5 degree improvement of right knee extension (or obtain heel lift for right shoe if not possible to improve knee ROM) to increase postural/spinal alignment. [x]? Date:10/26/2020              Long Term Goals~8 weeks Goal Met   1. Dennise Davila will demonstrate a 15 point improvement on the Oswestry to show improvement in function. 1.  []? Date:   2. Dennise Davila will report 0-1/10 pain at rest and during ADLs. 2.  []? Date:   3. Dennise Davila will demonstrate ability to lift at least 20 lbs from floor with safe lifting techniques, protecting lumbar spine, to enable pt to perform work activities safely. 3.  []? Date:   4. Dennise Davila will be able to perform SLS >5 seconds bilaterally to help with gait and improve balance 4. []? Date:     Dennise Davila will ambulate 10 min without increased LE symptoms demonstrating good stabilization and dynamics of lumbar and pelvic region.              Sit to  30 sec:  11  (11/2/20)  Neuro screen:   · DTR's: symmetrical 1+ patella B, 0 achilles B --pt tends to guard so this my not be accurate   · Derm: lateral R thigh, medial calf R mild diminished with light touch     TREATMENT:   THERAPEUTIC EXERCISE: (45 min)  Exercises per grid below to improve mobility, strength and balance. Required minimal visual, verbal and manual cues to promote proper body alignment and promote proper body posture.   Progressed resistance and complexity of movement as indicated.      Date:  9/24/20 Date:  10/1/20 10/2/20 10/5/20 10/7/20 10/15/20 10/19/20  10/22/20  10/26/20 11/2/20 11/9/20  11/16/20 11/19/20 12/10/20   Activity/Exercise Parameters Parameters               Education HEP, POC  New HEP, posture  Use of heel lift, new HEP  Heel lift wear     Gait: use of ankle PF to propel to decrease hip and back strain       Quad set  Standing      VMO focus, seated            Calf stretching  2 x 30 sec                 Stairs                  nustep  X 8 min level 4 10 min level 6   10 min level 3 (scifit) 10 min level 3.5 (sci fit ) 10 min level 3.5  - 10 min level 4  10 min level 4  10 min Level 4  10 min level 4  10 min level 5 10 min level 4 10 min level 4    Prone knee hang  With manual mobilizations x 5 min                 Hip flexor stretch  Standing LE on box with quad set   Manually       Modified pascale stretch - leg off side bed, R LE 3 x 5 knee flex 10sec hold Mod Girish stretch with OP x 5 with knee flex off side of bed         iso multif 5 sec x 2 min                iso hip flex 3 x 5 sec B                Prone hip ext with knee at 90   X 7, 5 sec hold           Prone hip flexor 3 sec x 5     Prone ham curls  X 10 left, focus on gentle iliopsoas stretch   X 10         X 10 B   X 5     clams NV X 10, 5 sec holds HEP    Supine hooklying RTB x 10 Supine RTB with TrA brace   hooklying RTB x 10        TKE NV NV SAQ with 10# x 20 reps    At end of sit to stand  Seated LAQ with focus on TKE with VMO x 10           piriformis stretch NV 3 x 20 sec right   Seated 3 sets 3x 10 sec holds B Seated 2 x 20 sec B   2 x 20 sec B supine     RTB x 40 ft       Marching   2 x 40 ft     Step tap on bolster x 1 min         In TA   Side stepping   2 x 40 ft 0 resist  YTB 2 x 40ft  NV Modify TB to above knee next session   5 x 2 steps to each side   RTB x 10    RTB 2 x 40  RTB 2 x 10   In TA   Lunge steps  2 x 40ft                SKTC           B 3 x 20 sec LTR  X 3 min  X 5        X 10 with OP        Bridges     X 10, 5 sec With resist band next visit    X 10   W/ RTB x 5  With ball squeeze (hip add) 2 x 10, 5 sec holds  With ball squeeze  With RTB x 10, 5sec       Treadmill    3min 30 sec 1. 9mph   2 min 1.8 mph              Walking     4 X 3 loops  4# x 4 loops (middle [id number])  5# x 4 , 8/10    5# x 4 laps (470 ft each  Round)  6# x 120 ft, limited by pain  8# x 410 limited by pain of right hip  8# x 270 ft  8# dumbells (farmers carry) 2 x  550ft    1 x 600 ft 8#   1 x 450ft  8# 5 x 40ft focus on giat pattern and posture  4 x 20 fwd backward RTB  7 x 80 ft 5# dumbells      2 x Sciatic nerve glide     slump test with head node x 10             Hip add    Ball squeeze seat 3 x 10 sec (2 sets)    With bridges  Ball squeeze 5 sec x 10 seated after walking to traction/pelvis  hip  Ball squeeze        Sit to stand     With 2# wand 2 x 10  3# wand 2 x 10  3# wand overhead x 10, focus on VMO and glute contraction    3 #wand overhead 2 x 10  With 8# dumbells x 10  overhead press up   Tactile cuing   10# kettle 2 x 10 reps   10# kettle bell 3 x 10 overhead press    Step overs       Over bolster x 10 laterals B            Calf raises        X 3 DL (pain increased of right hip)   2 x 10 wall to guide            Seated lumbar flexion          intermitently during rest breaks ~ 5x 10 sec, 3 sets  -----  With ball x 3 min      Shuttle          NV NV (occupied today)       DL             5# bar x 20     hinges             X 15 with dowel     Rolling               2 sets 5 UE, 2 x 5 LE                        THERAPEUTIC ACTIVITY: (  minutes): Activities per gid below to improve functional movement related mobility, strength and balance to improve neuro-muscular carryover to daily functional activities for improving patient's quality of life. Required visual, verbal and manual cues to promote proper body alignment and promote proper body posture/mechanics.  Progressed resistance and complexity of movement as indicated. Date:  9/21/2020 Date:  10/15/20 Date:  11/9/20 11/16/20  11/19/20 12/10/    Activity/Exercise Parameters Parameters Parameters      walking Focus on heel strike  Demo-do: heel strike Decreased step length, wider TEODORO 5# dumbells  Decreased step length and TEODORO 5# dumbells  163/82 BP  108 HR  8/10 hip pain    Butt kicks, side steps, high knees, norm gait    Posture  Cuing for weight bearing  Cuing to decrease cervical and thoracic flexion         Cuing thoughout                                                                                        MANUAL THERAPY: ( minutes): Joint mobilization, Soft tissue mobilization was utilized and necessary because of the patient's restricted joint motion and restricted motion of soft tissue mobility. Date  11/19/20       Technique Used Grade  Level # Time(s) Effect while being performed   PA's IV-- prox sacum, T-L junction  3 bouts  decreased joint stiffness    AP's  ---------- ------------------ Not today  Decreased joint stiffness    Hip distraction with rotation    Right, in supine, use of belt   2 x 5 bouts   Increased joint space, decreased stiffness    Hip medial/lateral  III Right hip, knee bent to 90 deg (prone) 3 bouts  Decrease hip hip joint stiffness    STM/MFR Skin glide and rolling  Along lumbar surg scar and surrounding area    Decreased adhesions affecting movement of spine and muscle     Deep MFR/ trigger point release   Bilateral lumbar paraspinal    Decrease muscle muscle spasm             Modalities: (10 minutes) to reduce inflammation    · Ice application to lower lumbar x 10 min, supine with leg wedge               HEP Log Date 1. Standing hip flex stretch  9/21/2020   2. Knee hang QS 9/21/2020   3. Piriformis stretch  12/10/2020      4. Heel strike with gait  12/10/2020      5. iso hip flex 12/10/2020    6. Clamshell  12/10/2020     7.  Piriformis Wong Estrada  12/10/2020          [employer] Portal  Treatment/Session Summary:    Response to Treatment:    Introduced rolling activity to increase spinal strength and mobility. Pain reduced after rolling activity. Less low back pain with ambulation after session today. Will continue to progress strength and functional mobility through resistance training. .     Communication/Consultation:   ---   Equipment provided today:  ----     Recommendations/Intent for next treatment session:   Next visit will focus on Manual Therapy Core Stability Quad strengthening Hip strengthening. Add t-band to bridges, continue with hip and core strengthening and walking tolerance. Manual hip/spine/knee mobs and DTM as needed         Treatment Plan of Care Effective Dates: 9/21/2020 TO 12/21/2020 (90 days).   Frequency/Duration: 2 times a week for 90 Days       Total Treatment Billable Duration: 45 min      PT Patient Time In/Time Out  Time In: 1100  Time Out: [address] Jaren Lee PT    Future Appointments   Date Time Provider Aria Carrillo   12/14/2020 10:30 AM Mortimer Fraise, PT [facility] AND [facility]   12/17/2020 11:00 AM Mortimer Fraise, PT [facility]   12/21/2020 11:15 AM Mortimer Fraise, PT [facility]   12/28/2020  8:30 AM Evelia Rios MD [facility]

## 2020-12-14 ENCOUNTER — HOSPITAL ENCOUNTER (OUTPATIENT)
Dept: PHYSICAL THERAPY | Age: 55
Discharge: HOME | End: 2020-12-14
Payer: COMMERCIAL

## 2020-12-14 PROCEDURE — 97110 THERAPEUTIC EXERCISES: CPT

## 2020-12-14 NOTE — PROGRESS NOTES
Loly Perla  : 1965  Payor: Ashley Gentile / Plan: [address] / Product Type: YENNIFERO /  Ila Morse at [address] [place]. [address] [place], [id number] [place] [address] Road  [phone]   [phone]                                                          Bruno Arizmendi MD      OUTPATIENT PHYSICAL THERAPY: Daily Treatment Note 2020   Visit Count:  16    Tx Diagnosis:  Pain in Right Hip (M25.551)  Stiffness of Right Hip, Not elsewhere classified (M25.651)  Stiffness of Right Knee, Not elsewhere classified (M25.661)  Other abnormalities of gait and mobility (R26.89)  Low Back Pain (M54.5)  Abnormal posture (R29.3)   Spondylolisthesis at L3-L4 level [M43.16]  Lumbar stenosis with neurogenic claudication [M48.062]      Pre-treatment Symptoms/Complaints: pt reports no changes in symptoms and states that he gives up on the pain going away. I encouraged pt to look at increasing endurance/time prior to pain limiting motion. 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 Mayela Sow will be independent with HEP 1.  [x]? Date:10/26/2020    2. Mayela Sow will participate in LE stretching program to increase flexibility as well as core stabilization exercises to help with maintaining spinal alignment and joint mechanics during ADLs and work related activities. 2.  [x]? Date:10/26/2020    3. Mayela Sow will report improved sleep consistently over a 1-2 week period. 3.  [x]? Date:10/26/2020    4.  Mayela Sow will participate in LE strengthening program with weights/resistance as appropriate to help with gait, ADLS and work related activities. 4.  [x]? Date:10/26/2020    5. Meryle Collet will participate in static and dynamic balance activities to decrease the risk for falls and improve lumbar/pelvic stabilization mechanics. 5.  [x]? Date:10/26/2020    6. Meryle Collet will tolerate manual therapy/joint mobilizations/soft tissue to increase ROM and decrease pain     6. [x]? Date:10/26/2020       Meryle Collet will demonstrate a 5 degree improvement of right knee extension (or obtain heel lift for right shoe if not possible to improve knee ROM) to increase postural/spinal alignment. [x]? Date:10/26/2020              Long Term Goals~8 weeks Goal Met   1. Meryle Collet will demonstrate a 15 point improvement on the Oswestry to show improvement in function. 1.  []? Date:   2. Meryle Collet will report 0-1/10 pain at rest and during ADLs. 2.  []? Date:   3. Meryle Collet will demonstrate ability to lift at least 20 lbs from floor with safe lifting techniques, protecting lumbar spine, to enable pt to perform work activities safely. 3.  []? Date:   4. Meryle Collet will be able to perform SLS >5 seconds bilaterally to help with gait and improve balance 4. []? Date:     Meryle Collet will ambulate 10 min without increased LE symptoms demonstrating good stabilization and dynamics of lumbar and pelvic region.              Sit to  30 sec:  11  (11/2/20)  Neuro screen:   · DTR's: symmetrical 1+ patella B, 0 achilles B --pt tends to guard so this my not be accurate   · Derm: lateral R thigh, medial calf R mild diminished with light touch     TREATMENT:   THERAPEUTIC EXERCISE: (45 min)  Exercises per grid below to improve mobility, strength and balance. Required minimal visual, verbal and manual cues to promote proper body alignment and promote proper body posture. Progressed resistance and complexity of movement as indicated.      Date:  9/24/20 Date:  10/1/20 10/2/20 10/5/20 10/7/20 10/15/20 10/19/20  10/22/20  10/26/20 11/2/20 11/9/20  11/16/20 11/19/20 12/10/20 12/14/20   Activity/Exercise Parameters Parameters                Education HEP, POC  New HEP, posture  Use of heel lift, new HEP  Heel lift wear     Gait: use of ankle PF to propel to decrease hip and back strain        Quad set  Standing      VMO focus, seated             Calf stretching  2 x 30 sec                  Stairs                   nustep  X 8 min level 4 10 min level 6   10 min level 3 (scifit) 10 min level 3.5 (sci fit ) 10 min level 3.5  - 10 min level 4  10 min level 4  10 min Level 4  10 min level 4  10 min level 5 10 min level 4 10 min level 4  10 min level 4    Prone knee hang  With manual mobilizations x 5 min                  Hip flexor stretch  Standing LE on box with quad set   Manually       Modified pascale stretch - leg off side bed, R LE 3 x 5 knee flex 10sec hold Mod Girish stretch with OP x 5 with knee flex off side of bed          iso multif 5 sec x 2 min                 iso hip flex 3 x 5 sec B                 Prone hip ext with knee at 90   X 7, 5 sec hold           Prone hip flexor 3 sec x 5      Prone ham curls  X 10 left, focus on gentle iliopsoas stretch   X 10         X 10 B   X 5      clams NV X 10, 5 sec holds HEP    Supine hooklying RTB x 10 Supine RTB with TrA brace   hooklying RTB x 10         TKE NV NV SAQ with 10# x 20 reps    At end of sit to stand  Seated LAQ with focus on TKE with VMO x 10            piriformis stretch NV 3 x 20 sec right   Seated 3 sets 3x 10 sec holds B Seated 2 x 20 sec B   2 x 20 sec B supine     RTB x 40 ft     Seated x 4   Marching   2 x 40 ft     Step tap on bolster x 1 min         In TA    Side stepping   2 x 40 ft 0 resist  YTB 2 x 40ft  NV Modify TB to above knee next session   5 x 2 steps to each side   RTB x 10    RTB 2 x 40  RTB 2 x 10   In TA    Lunge steps  2 x 40ft                 SKTC           B 3 x 20 sec         LTR  X 3 min  X 5        X 10 with OP         Bridges     X 10, 5 sec With resist band next visit    X 10   W/ RTB x 5  With ball squeeze (hip add) 2 x 10, 5 sec holds  With ball squeeze  With RTB x 10, 5sec        Treadmill    3min 30 sec 1. 9mph   2 min 1.8 mph               Walking     4 X 3 loops  4# x 4 loops (middle [id number])  5# x 4 , 8/10    5# x 4 laps (470 ft each  Round)  6# x 120 ft, limited by pain  8# x 410 limited by pain of right hip  8# x 270 ft  8# dumbells (farmers carry) 2 x  550ft    1 x 600 ft 8#   1 x 450ft  8# 5 x 40ft focus on giat pattern and posture  4 x 20 fwd backward RTB  7 x 80 ft 5# dumbells    8# wts 5 laps (600 ft)    8# wts 3 laps  380 ft          2 x Sciatic nerve glide     slump test with head node x 10              Hip add    Ball squeeze seat 3 x 10 sec (2 sets)    With bridges  Ball squeeze 5 sec x 10 seated after walking to traction/pelvis  hip  Ball squeeze         Sit to stand     With 2# wand 2 x 10  3# wand 2 x 10  3# wand overhead x 10, focus on VMO and glute contraction    3 #wand overhead 2 x 10  With 8# dumbells x 10  overhead press up   Tactile cuing   10# kettle 2 x 10 reps   10# kettle bell 3 x 10 overhead press  10# kettle bell 3 x 10   Step overs       Over bolster x 10 laterals B             Calf raises        X 3 DL (pain increased of right hip)   2 x 10 wall to guide             Seated lumbar flexion          intermitently during rest breaks ~ 5x 10 sec, 3 sets  -----  With ball x 3 min       Shuttle          NV NV (occupied today)        DL             5# bar x 20   Touch downs   hinges             X 15 with dowel      Rolling               2 sets 5 UE, 2 x 5 LE 2 x 4 reps                          THERAPEUTIC ACTIVITY: (  minutes): Activities per gid below to improve functional movement related mobility, strength and balance to improve neuro-muscular carryover to daily functional activities for improving patient's quality of life.  Required visual, verbal and manual cues to promote proper body alignment and promote proper body posture/mechanics. Progressed resistance and complexity of movement as indicated. Date:  9/21/2020 Date:  10/15/20 Date:  11/9/20 11/16/20  11/19/20 12/10    Activity/Exercise Parameters Parameters Parameters      walking Focus on heel strike  Demo-do: heel strike Decreased step length, wider TEODORO 5# dumbells  Decreased step length and TEODORO 5# dumbells  163/82 BP  108 HR  8/10 hip pain    Butt kicks, side steps, high knees, norm gait    Posture  Cuing for weight bearing  Cuing to decrease cervical and thoracic flexion         Cuing thoughout                                                                                        MANUAL THERAPY: ( minutes): Joint mobilization, Soft tissue mobilization was utilized and necessary because of the patient's restricted joint motion and restricted motion of soft tissue mobility. Date  11/19/20       Technique Used Grade  Level # Time(s) Effect while being performed   PA's IV-- prox sacum, T-L junction  3 bouts  decreased joint stiffness    AP's  ---------- ------------------ Not today  Decreased joint stiffness    Hip distraction with rotation    Right, in supine, use of belt   2 x 5 bouts   Increased joint space, decreased stiffness    Hip medial/lateral  III Right hip, knee bent to 90 deg (prone) 3 bouts  Decrease hip hip joint stiffness    STM/MFR Skin glide and rolling  Along lumbar surg scar and surrounding area    Decreased adhesions affecting movement of spine and muscle     Deep MFR/ trigger point release   Bilateral lumbar paraspinal    Decrease muscle muscle spasm             Modalities: (10 minutes) to reduce inflammation    · Ice application to lower lumbar x 10 min, supine with leg wedge               HEP Log Date 1. Standing hip flex stretch  9/21/2020   2. Knee hang QS 9/21/2020   3. Piriformis stretch  12/14/2020      4.  Heel strike with gait 12/14/2020      5. iso hip flex 12/14/2020    6. Clamshell  12/14/2020     7. Piriformis     Marching     Bridges  12/14/2020          [facility] Portal  Treatment/Session Summary:    Response to Treatment:  decreased pain at end of session. Progressed walking today but still increased right hip and LE pain. Communication/Consultation:   ---   Equipment provided today:  ----     Recommendations/Intent for next treatment session:   Next visit will focus on Manual Therapy Core Stability Quad strengthening Hip strengthening. Add t-band to bridges, continue with hip and core strengthening and walking tolerance. Manual hip/spine/knee mobs and DTM as needed         Treatment Plan of Care Effective Dates: 9/21/2020 TO 12/21/2020 (90 days).   Frequency/Duration: 2 times a week for 90 Days       Total Treatment Billable Duration: 45 min      PT Patient Time In/Time Out  Time In: 1030  Time Out: Leodan [address], PT    Future Appointments   Date Time Provider Aria Carrillo   12/17/2020 11:00 AM Sona Campa, PT [facility]   12/21/2020 11:15 AM Sona Campa, PT [facility]   12/28/2020  8:30 AM Phani Rios MD [facility]

## 2020-12-21 ENCOUNTER — HOSPITAL ENCOUNTER (OUTPATIENT)
Dept: PHYSICAL THERAPY | Age: 55
Discharge: HOME | End: 2020-12-21
Payer: COMMERCIAL

## 2020-12-21 PROCEDURE — 97140 MANUAL THERAPY 1/> REGIONS: CPT

## 2020-12-21 NOTE — PROGRESS NOTES
Alma Rosa [place] Minda  : 1965  Payor: [id number] Gentile / Plan: [address] / Product Type: PPO /  [address] at [address] [place]. [address] [place], [address] [address]  [phone]   [phone]                                                          Reynaldo Green MD      OUTPATIENT PHYSICAL THERAPY: Daily Treatment Note 2020   Visit Count:  17    Tx Diagnosis:  Pain in Right Hip (M25.551)  Stiffness of Right Hip, Not elsewhere classified (M25.651)  Stiffness of Right Knee, Not elsewhere classified (M25.661)  Other abnormalities of gait and mobility (R26.89)  Low Back Pain (M54.5)  Abnormal posture (R29.3)   Spondylolisthesis at L3-L4 level [M43.16]  Lumbar stenosis with neurogenic claudication [M48.062]      Pre-treatment Symptoms/Complaints: pt states that he is doing ok but has same aching pain across back--unchanged. I dont see MD until end of January an he said to see him after therapy was finished. 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 Julio Rizo will be independent with HEP 1.  [x]? Date:10/26/2020    2. Julio Rizo will participate in LE stretching program to increase flexibility as well as core stabilization exercises to help with maintaining spinal alignment and joint mechanics during ADLs and work related activities. 2.  [x]? Date:10/26/2020    3. Julio Rizo will report improved sleep consistently over a 1-2 week period. 3.  [x]? Date:10/26/2020    4.  Julio Rizo will participate in LE strengthening program with weights/resistance as appropriate to help with gait, ADLS and work related activities. 4.  [x]? Date:10/26/2020    5. Mariposa Hoang will participate in static and dynamic balance activities to decrease the risk for falls and improve lumbar/pelvic stabilization mechanics. 5.  [x]? Date:10/26/2020    6. Mariposa Hoang will tolerate manual therapy/joint mobilizations/soft tissue to increase ROM and decrease pain     6. [x]? Date:10/26/2020       Mariposa Hoang will demonstrate a 5 degree improvement of right knee extension (or obtain heel lift for right shoe if not possible to improve knee ROM) to increase postural/spinal alignment. [x]? Date:10/26/2020              Long Term Goals~8 weeks Goal Met   1. Mariposa Hoang will demonstrate a 15 point improvement on the Oswestry to show improvement in function. 1.  []? Date:   2. Mariposa Hoang will report 0-1/10 pain at rest and during ADLs. 2.  []? Date:   3. Mariposa Hoang will demonstrate ability to lift at least 20 lbs from floor with safe lifting techniques, protecting lumbar spine, to enable pt to perform work activities safely. 3.  []? Date:   4. Mariposa Hoang will be able to perform SLS >5 seconds bilaterally to help with gait and improve balance 4. []? Date:     Mariposa Hoang will ambulate 10 min without increased LE symptoms demonstrating good stabilization and dynamics of lumbar and pelvic region.              Sit to  30 sec:  11  (11/2/20)  Neuro screen:   · DTR's: symmetrical 1+ patella B, 0 achilles B --pt tends to guard so this my not be accurate   · Derm: lateral R thigh, medial calf R mild diminished with light touch     TREATMENT:   THERAPEUTIC EXERCISE: ( min)  Exercises per grid below to improve mobility, strength and balance. Required minimal visual, verbal and manual cues to promote proper body alignment and promote proper body posture. Progressed resistance and complexity of movement as indicated.      Date:  9/24/20 Date:  10/1/20 10/2/20 10/5/20 10/7/20 10/15/20 10/19/20  10/22/20  10/26/20 11/2/20 11/9/20  11/16/20 11/19/20 12/10/20 12/14/20 12/21/20   Activity/Exercise Parameters Parameters                 Education HEP, POC  New HEP, posture  Use of heel lift, new HEP  Heel lift wear     Gait: use of ankle PF to propel to decrease hip and back strain         Quad set  Standing      VMO focus, seated              Calf stretching  2 x 30 sec                   Stairs                    nustep  X 8 min level 4 10 min level 6   10 min level 3 (scifit) 10 min level 3.5 (sci fit ) 10 min level 3.5  - 10 min level 4  10 min level 4  10 min Level 4  10 min level 4  10 min level 5 10 min level 4 10 min level 4  10 min level 4     Prone knee hang  With manual mobilizations x 5 min                   Hip flexor stretch  Standing LE on box with quad set   Manually       Modified pascale stretch - leg off side bed, R LE 3 x 5 knee flex 10sec hold Mod Girish stretch with OP x 5 with knee flex off side of bed           iso multif 5 sec x 2 min                  iso hip flex 3 x 5 sec B                  Prone hip ext with knee at 90   X 7, 5 sec hold           Prone hip flexor 3 sec x 5       Prone ham curls  X 10 left, focus on gentle iliopsoas stretch   X 10         X 10 B   X 5       clams NV X 10, 5 sec holds HEP    Supine hooklying RTB x 10 Supine RTB with TrA brace   hooklying RTB x 10          TKE NV NV SAQ with 10# x 20 reps    At end of sit to stand  Seated LAQ with focus on TKE with VMO x 10             piriformis stretch NV 3 x 20 sec right   Seated 3 sets 3x 10 sec holds B Seated 2 x 20 sec B   2 x 20 sec B supine     RTB x 40 ft     Seated x 4    Marching   2 x 40 ft     Step tap on bolster x 1 min         In TA     Side stepping   2 x 40 ft 0 resist  YTB 2 x 40ft  NV Modify TB to above knee next session   5 x 2 steps to each side   RTB x 10    RTB 2 x 40  RTB 2 x 10   In TA     Lunge steps  2 x 40ft                  SKTC           B 3 x 20 sec LTR  X 3 min  X 5        X 10 with OP          Bridges     X 10, 5 sec With resist band next visit    X 10   W/ RTB x 5  With ball squeeze (hip add) 2 x 10, 5 sec holds  With ball squeeze  With RTB x 10, 5sec         Treadmill    3min 30 sec 1. 9mph   2 min 1.8 mph                Walking     4 X 3 loops  4# x 4 loops (middle [id number])  5# x 4 , 8/10    5# x 4 laps (470 ft each  Round)  6# x 120 ft, limited by pain  8# x 410 limited by pain of right hip  8# x 270 ft  8# dumbells (farmers carry) 2 x  550ft    1 x 600 ft 8#   1 x 450ft  8# 5 x 40ft focus on giat pattern and posture  4 x 20 fwd backward RTB  7 x 80 ft 5# dumbells    8# wts 5 laps (600 ft)    8# wts 3 laps  380 ft           2 x Sciatic nerve glide     slump test with head node x 10               Hip add    Ball squeeze seat 3 x 10 sec (2 sets)    With bridges  Ball squeeze 5 sec x 10 seated after walking to traction/pelvis  hip  Ball squeeze          Sit to stand     With 2# wand 2 x 10  3# wand 2 x 10  3# wand overhead x 10, focus on VMO and glute contraction    3 #wand overhead 2 x 10  With 8# dumbells x 10  overhead press up   Tactile cuing   10# kettle 2 x 10 reps   10# kettle bell 3 x 10 overhead press  10# kettle bell 3 x 10    Step overs       Over bolster x 10 laterals B              Calf raises        X 3 DL (pain increased of right hip)   2 x 10 wall to guide              Seated lumbar flexion          intermitently during rest breaks ~ 5x 10 sec, 3 sets  -----  With ball x 3 min        Shuttle          NV NV (occupied today)         DL             5# bar x 20   Touch downs    hinges             X 15 with dowel       Rolling               2 sets 5 UE, 2 x 5 LE 2 x 4 reps                            THERAPEUTIC ACTIVITY: (  minutes): Activities per gid below to improve functional movement related mobility, strength and balance to improve neuro-muscular carryover to daily functional activities for improving patient's quality of life. Required visual, verbal and manual cues to promote proper body alignment and promote proper body posture/mechanics. Progressed resistance and complexity of movement as indicated. Date:  9/21/2020 Date:  10/15/20 Date:  11/9/20 11/16/20  11/19/20 12/10    Activity/Exercise Parameters Parameters Parameters      walking Focus on heel strike  Demo-do: heel strike Decreased step length, wider TEODORO 5# dumbells  Decreased step length and TEODORO 5# dumbells  163/82 BP  108 HR  8/10 hip pain    Butt kicks, side steps, high knees, norm gait    Posture  Cuing for weight bearing  Cuing to decrease cervical and thoracic flexion         Cuing thoughout                                                                                        MANUAL THERAPY: (35 minutes): Joint mobilization, Soft tissue mobilization was utilized and necessary because of the patient's restricted joint motion and restricted motion of soft tissue mobility. Date  12/21/20       Technique Used Grade  Level # Time(s) Effect while being performed   PA's IV-- prox sacum, T-L junction  3 bouts  decreased joint stiffness    AP's  ---------- ------------------ Not today  Decreased joint stiffness    Hip distraction with rotation    Right, in supine, use of belt   2 x 5 bouts   Increased joint space, decreased stiffness    Hip medial/lateral  III Right hip, knee bent to 90 deg (prone) 3 bouts  Decrease hip hip joint stiffness    STM/MFR Skin glide and rolling  Along lumbar surg scar and surrounding area  X 15 min   Decreased adhesions affecting movement of spine and muscle     Deep MFR/ trigger point release   Bilateral lumbar paraspinal   x 10  Decrease muscle muscle spasm             Modalities: ( minutes) to reduce inflammation    · Ice application to lower lumbar x 10 min, supine with leg wedge               HEP Log Date 1. Standing hip flex stretch  9/21/2020   2. Knee hang QS 9/21/2020   3.   Piriformis stretch 12/21/2020      4. Heel strike with gait  12/21/2020      5. iso hip flex 12/21/2020    6. Clamshell  12/21/2020     7. Piriformis     Marching     Bridges  12/21/2020          [facility] Portal  Treatment/Session Summary:    Response to Treatment:  focused on manual techniques today to decrease scar adhesiona and muscle tone as this was very helpful previously. Decreased pain at end of session. Much improved ST mobility after manual techniques. Will continue to improve functional mobility and endurance with manual techniques as helpful. Communication/Consultation:   ---   Equipment provided today:  ----     Recommendations/Intent for next treatment session:   Next visit will focus on Manual Therapy Core Stability Quad strengthening Hip strengthening. Add t-band to bridges, continue with hip and core strengthening and walking tolerance. Manual hip/spine/knee mobs and DTM as needed         Treatment Plan of Care Effective Dates: 9/21/2020 TO 12/21/2020 (90 days).   Frequency/Duration: 2 times a week for 90 Days       Total Treatment Billable Duration: 45 min      PT Patient Time In/Time Out  Time In: 1115  Time Out: [address], PT    Future Appointments   Date Time Provider Aria Carrillo   12/28/2020  8:30 AM Ada Solis MD [facility]

## 2021-01-06 ENCOUNTER — HOSPITAL ENCOUNTER (OUTPATIENT)
Dept: PHYSICAL THERAPY | Age: 56
Discharge: HOME | End: 2021-01-06
Payer: COMMERCIAL

## 2021-01-06 PROCEDURE — 97110 THERAPEUTIC EXERCISES: CPT

## 2021-01-06 NOTE — THERAPY RECERTIFICATION
Janice Cox : 1965 Payor: [id number] / Plan: [place] / Product Type: YENNIFERO /  Rober Goldberg at [facility] [address] [facility], [address] [place] [address], [facility], [address] [place] [address] [phone]   [phone] OUTPATIENT PHYSICAL THERAPY:Recertification 5/3/3398 ICD-10: Treatment Diagnosis:  
Pain in Right Hip (M25.551) Stiffness of Right Hip, Not elsewhere classified (M25.651) Stiffness of Right Knee, Not elsewhere classified (M25.661) Other abnormalities of gait and mobility (R26.89) Low Back Pain (M54.5) Abnormal posture (R29.3) Spondylolisthesis at L3-L4 level [M43.16] Lumbar stenosis with neurogenic claudication [M48.062] Precautions/Allergies:  
Patient has no known allergies. MD Orders: Eval and Treat  MEDICAL/REFERRING DIAGNOSIS: 
Spondylolisthesis at L3-L4 level DATE OF ONSET: 5 years ago symptoms; most recent surgery: 8/10/20 REFERRING PHYSICIAN: Kira Willis MD 
RETURN PHYSICIAN APPOINTMENT: week of    
 
ASSESSMENT:  Mr. Janice Cox has completed 18 to physical therapy with functional limitations related to low back and leg pain. Overall, he has improved strength and lumbar ROM and has an extensive HEP he is independent with, however he continues to report constant low back pain and right hip pain. Janice Cox continues to be be limited with ambulation due to right hip and groin pain -- gait pattern significantly deteriorates when pain increases, demonstrating use of momentum and lateral trunk movement to advance LE's. Will continue to focus on walking endurance as tolerated however, I have encouraged pt to address right hip at next MD appointment . Mavis Daley will benefit from continued skilled physical therapy for 2-3 more weeks leading up to MD f/u appointment to address deficits affecting participation in basic ADLs and functional mobility/tolerance. Patient will benefit from manual therapeutic techniques (stretching, joint mobilizations, soft tissue mobilization/myofascial release), therapeutic exercises and activities, postural strengthening/education, and comprehensive home exercises program to address current impairments and functional limitations. PROBLEM LIST (Impacting functional limitations): 1. Decreased Strength 2. Decreased ADL/Functional Activities 3. Decreased Transfer Abilities 4. Decreased Ambulation Ability/Technique 5. Increased Pain 6. Decreased Activity Tolerance 7. Decreased Flexibility/Joint Mobility 8. Decreased [place] with Home Exercise Program INTERVENTIONS PLANNED: 
1. Balance Exercise 2. Cryotherapy 3. Electrical Stimulation 4. Gait Training 5. Heat 6. Home Exercise Program (HEP) 7. Manual Therapy 8. Neuromuscular Re-education/Strengthening 9. Range of Motion (ROM) 10. Therapeutic Activites 11. Therapeutic Exercise/Strengthening 12. Transfer Training 13. Lumbar Traction TREATMENT PLAN: 
Effective Dates: 1/6/21-2/5/2021  Frequency/Duration: 2 times a week GOALS: (Goals have been discussed and agreed upon with patient.) Short-Term Goals~4 weeks  Goal Met 1. Mavis Daley will be independent with HEP 1. [x] 2. Mavis Daley will participate in LE stretching program to increase flexibility as well as core stabilization exercises to help with maintaining spinal alignment and joint mechanics during ADLs and work related activities. 2.  [x] 3. Mavis Daley will report improved sleep consistently over a 1-2 week period. 3.  [x] 4. Mag Dsouza will participate in LE strengthening program with weights/resistance as appropriate to help with gait, ADLS and work related activities. 4.  [x] 5. Mag Dsouza will participate in static and dynamic balance activities to decrease the risk for falls and improve lumbar/pelvic stabilization mechanics. 5.  [x] 6. Mag Dsouza will tolerate manual therapy/joint mobilizations/soft tissue to increase ROM and decrease pain 6. [x] Mag Dsouza will demonstrate a 5 degree improvement of right knee extension (or obtain heel lift for right shoe if not possible to improve knee ROM) to increase postural/spinal alignment. Met Long Term Goals~8 weeks Goal Met 1. Mag Dsouza will demonstrate a 15 point improvement on the Oswestry to show improvement in function. 1.  []  
2. Mag Dsouza will report 0-1/10 pain at rest and during ADLs. 2.  []   
3. Mag Dsouza will demonstrate ability to lift at least 20 lbs from floor with safe lifting techniques, protecting lumbar spine, to enable pt to perform work activities safely. 4. Mag Dsouza will be able to perform SLS >5 seconds bilaterally to help with gait and improve balance 4. [x] Mag Dsouza will ambulate 10 min without increased LE symptoms demonstrating good stabilization and dynamics of lumbar and pelvic region. Outcome Measure: Tool Used: Modified Oswestry Low Back Pain Questionnaire Score:  Initial: 28/50  Most Recent: 22/50 (Date:1/6/21) Interpretation of Score: Each section is scored on a 0-5 scale, 5 representing the greatest disability. The scores of each section are added together for a total score of 50. Medical Necessity:  
· Skilled intervention continues to be required due to above deficits affecting participation in basic ADLs and overall functional tolerance. Reason for Services/Other Comments: · Patient continues to require skilled intervention due to  above deficits affecting participation in basic ADLs and overall functional tolerance. 
 
Total Treatment Duration:  
PT Patient Time In/Time Out 
Time In: 0900 
Time Out: 0945 
 
Rehabilitation Potential For Stated Goals: fair 
Regarding Stephan Perla's therapy, I certify that the treatment plan above will be carried out by a therapist or under their direction. 
Thank you for this referral, 
Geraldine Wihting, PT    
 
  
    
 
 
HISTORY:  
History of Present Injury/Illness (Reason for Referral): 
Pt states 5 years ago onset low back pain and BLE pain insidiously and worsening over time. MRI 2 years ago showing lumbar stenosis as well as spondylolisthesis. Pt underwent right laminectomy at that time with good results eventually, however, pain began.   Pt states that location of pain is slightly different as it wraps around waist to groin and to right LE, occasionally left LE.  Lumbar laminectomy Aug 10, 2020 left side. Pt states that he has not experienced much relief of symptoms after this most recent surgery.  MD recommends pt to lose weight and referred to physical therapy to improve core strength.       
-Present symptoms/complaints (on day of evaluation): low back pain intermittent, right groin pain radiating distally down right LE  
Pain Scale: 
· Worst: 8/10 
 
· Aggravating factors:  side lying left, 2 min walking, weedeating/yardwork, sitting prolonged 
· Relieving factors: lying down on right side with pillow between knees, position change  
· Irritability: Medium (Onset of pain is equal to alleviation) 
 
 
Past Medical History/Comorbidities:  
 Mr. Norberto Beckwith  has a past medical history of Aneurysm ([address] CAD (coronary artery disease), Chronic pain, Depressive disorder, not elsewhere classified, Essential hypertension, benign, GERD (gastroesophageal reflux disease), Obese, Pure hypercholesterolemia, Sleep apnea, and Thyroid disease. Mr. Norberto Beckwith  has a past surgical history that includes pr colonoscopy flx dx w/collj spec when pfrmd (8/17/2018); colonoscopy (N/A, 8/17/2018); pr cardiac surg procedure unlist (2014); pr cardiac surg procedure unlist (2015); pr abdomen surgery proc unlisted (08/2018); hx lumbar laminectomy (08/10/2020); hx knee arthroscopy (Right, YRS AGO); hx back surgery; and hx back surgery. LBP sx: 2018:  Left L3-4 laminectomy, facetectomy, and diskectomy, L3-4 transforaminal lumbar interbody fusion with expandable Spine Wave cage, autograft bone, OsteoAMP, and bone marrow aspirate,Lateral transverse process fusion, L3-4, with autograft bone 
2020: Right L3-4 laminectomy, facetectomy and foraminotomy; Lysis of adhesions. Social History/Living Environment:  
Lives with spouse, 1 story home Prior Level of Function/Work/Activity: 
 /construction Previous Treatment Approach Previous Physical Therapy Dominant Side:  
Right Other Clinical Tests MRI Positive for lumbar spodylolithesis, stenosis (prior to surgery) Active Ambulatory Problems Diagnosis Date Noted  Essential hypertension, benign  Depressive disorder, not elsewhere classified  Pure hypercholesterolemia  Major depressive disorder with single episode, in partial remission ([address] 05/05/2016  Spondylolisthesis at L3-L4 level 10/09/2018  Lumbar stenosis with neurogenic claudication 10/09/2018  Strain of lumbar region 01/16/2020  Class 1 obesity due to excess calories without serious comorbidity with body mass index (BMI) of 33.0 to 33.9 in adult 01/16/2020  Severe obesity ([address] 03/31/2020  Coronary artery disease with stable angina pectoris ([facility] 08/28/2020 Resolved Ambulatory Problems Diagnosis Date Noted  No Resolved Ambulatory Problems Past Medical History:  
Diagnosis Date  Aneurysm ([facility]  CAD (coronary artery disease)  Chronic pain  GERD (gastroesophageal reflux disease)  Obese  Sleep apnea  Thyroid disease Note: Patient denies any increase of symptoms with cough, sneeze or valsalva. Patient denies any saddle paresthesia or bowel/bladder deficits. Current Medications:   
Current Outpatient Medications:  
  DULoxetine (CYMBALTA) 60 mg capsule, Take 1 Cap by mouth two (2) times a day., Disp: 180 Cap, Rfl: 1   venlafaxine-SR (EFFEXOR-XR) 150 mg capsule, Take 1 Cap by mouth daily. (Patient taking differently: Take 150 mg by mouth nightly.), Disp: 90 Cap, Rfl: 1 
  atorvastatin (LIPITOR) 80 mg tablet, Take 1 Tab by mouth daily. , Disp: 90 Tab, Rfl: 1 
  levothyroxine (SYNTHROID) 50 mcg tablet, Take 1 Tab by mouth Daily (before breakfast). , Disp: 30 Tab, Rfl: 3 
  isosorbide mononitrate ER (IMDUR) 120 mg CR tablet, Take 120 mg by mouth two (2) times a day., Disp: , Rfl:  
  ranolazine ER (RANEXA) 500 mg SR tablet, Take 500 mg by mouth., Disp: , Rfl:  
  amLODIPine (NORVASC) 10 mg tablet, Take 10 mg by mouth two (2) times a day., Disp: , Rfl:  
  omeprazole (PRILOSEC OTC) 20 mg tablet, Take 1 Tab by mouth daily. , Disp: 30 Tab, Rfl: 6 
  metoprolol tartrate (LOPRESSOR) 100 mg IR tablet, Take 1 Tab by mouth two (2) times a day., Disp: 60 Tab, Rfl: 6 
  acetaminophen (TYLENOL EXTRA STRENGTH) 500 mg tablet, Take  by mouth every six (6) hours as needed for Pain., Disp: , Rfl:  
  lisinopril (PRINIVIL, ZESTRIL) 20 mg tablet, Take  by mouth daily. , Disp: , Rfl:  
  aspirin delayed-release 81 mg tablet, Take  by mouth daily. , Disp: , Rfl:  
   nitroglycerin (NITROSTAT) 0.4 mg SL tablet, by SubLINGual route every five (5) minutes as needed for Chest Pain., Disp: , Rfl:  
  prasugrel (EFFIENT) 10 mg tablet, Take 10 mg by mouth daily. Last dose 11/6/18-  Dr Randal Nino had MI/CABG in 2014, Disp: , Rfl:   
 
 Ambulatory/Rehab Services H2 Model Falls Risk Assessment Risk Factors: 
     (1)  Gender [Male] Ability to Rise from Chair: 
     (0)  Ability to rise in a single movement Falls Prevention Plan: No modifications necessary Total: (5 or greater = High Risk): 1 ©2010 [facility] [address] [place] [address] [place] Patent #[id number]. Federal Law prohibits the replication, distribution or use without written permission from [facility] Date Last Reviewed:  1/6/2021 EXAMINATION:  
Observation/Orthostatic Postural Assessment:   
· Standing: right IC and PSIS lower than left, Right knee and hip ~10-15 degrees flexion · Seated: poor posture with decreased lumbar lordotic curvature and increased thoracic kyphotic curvature · Supine: right knee and hip flexed mild, right LE shorter than left ·  Surgical site: mild edema, closed, no drainage, normal coloration ROM:   
       
AROM/PROM Joint: Eval Date:   Re-Assess Date: 1.6.21  Re-Assess Date: Active ROM RIGHT LEFT RIGHT LEFT RIGHT LEFT Knee Extension -15 0 Knee Flexion [facility] Hip extension  Limited  [facility] Hip Abduction Lumbar Flexion 50%  100% Lumbar Extension 25% CS  50% Lumbar Side-bending 50% 75% 100% 100% Lumbar Rotation [facility] % 100% Strength:   
 Eval Date:   Re-Assess Date: 1.6.21  Re-Assess Date:   
  RIGHT LEFT RIGHT LEFT RIGHT LEFT Knee Flexion (L5-S2)   4+/5  4+/5  ?  ? ?   
Knee Extension (L3, L4)  5/5 within available range   5/5  ?  ?  ? Hip Flexion (L1, L2)  4/5  4/5 4+ 4+ ?  ? Hip Extension  4+/5 within available range   4+/5  ?  ?  ?   
 Hip Abduction (L5, S1)  4+/5  4+/5  ?  ?  ? Ankle Dorsiflexion (L4)  4+/5  4+/5  ?  ?  ? Ankle Plantar Flexion (S1-S2)  WFL  WFL  ? ?  ?   
 
 
SFunctional Mobility:   
Gait: unassisted,  820 ft 1 rest 6 min Transfers: Sit to  30 sec:  11 See associated treatment note for treatment provided today Future Appointments Date Time Provider Aria Carrillo 1/8/2021  9:00 AM Charreinaldoe Rist, PT [facility]  
1/13/2021  9:00 AM Charoloelizabethe Rist, PT [facility]  
1/15/2021  9:00 AM Charlayo Louiset, PT [facility]  
1/18/2021  9:00 AM Shlomo Neves MD [facility]  
1/20/2021  9:00 AM Viridianae Danit, PT [facility]  
1/22/2021  9:00 AM Pepe Louiset, PT [facility]  
3/29/2021  9:10 AM Becca Rios MD [facility] Jeimy Banegas, PT

## 2021-01-06 NOTE — PROGRESS NOTES
Nash Miller Matiasmbjazmín  : 1965  Payor: Ashley Gentile / Plan: [address] / Product Type: PPO /  [address] at [address], [address] [place], [address]  [phone]   [phone]                                                          Danielito Rao MD      OUTPATIENT PHYSICAL THERAPY: Daily Treatment Note 2021   Visit Count:  18    Tx Diagnosis:  Pain in Right Hip (M25.551)  Stiffness of Right Hip, Not elsewhere classified (M25.651)  Stiffness of Right Knee, Not elsewhere classified (M25.661)  Other abnormalities of gait and mobility (R26.89)  Low Back Pain (M54.5)  Abnormal posture (R29.3)   Spondylolisthesis at L3-L4 level [M43.16]  Lumbar stenosis with neurogenic claudication [M48.062]      Pre-treatment Symptoms/Complaints: pt states that he is doing ok but has same aching pain across back--unchanged. I dont see MD until end of January an he said to see him after therapy was finished. 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 John Neves will be independent with HEP 1.  [x]? Date:10/26/2020    2. John Neves will participate in LE stretching program to increase flexibility as well as core stabilization exercises to help with maintaining spinal alignment and joint mechanics during ADLs and work related activities. 2.  [x]? Date:10/26/2020    3. John Neves will report improved sleep consistently over a 1-2 week period. 3.  [x]? Date:10/26/2020    4.  John Neves will participate in LE strengthening program with weights/resistance as appropriate to help with gait, ADLS and work related activities. 4.  [x]? Date:10/26/2020    5. Janice Cox will participate in static and dynamic balance activities to decrease the risk for falls and improve lumbar/pelvic stabilization mechanics. 5.  [x]? Date:10/26/2020    6. Janice Cox will tolerate manual therapy/joint mobilizations/soft tissue to increase ROM and decrease pain     6. [x]? Date:10/26/2020       Janice Cox will demonstrate a 5 degree improvement of right knee extension (or obtain heel lift for right shoe if not possible to improve knee ROM) to increase postural/spinal alignment. [x]? Date:10/26/2020              Long Term Goals~8 weeks Goal Met   1. Janice Cox will demonstrate a 15 point improvement on the Oswestry to show improvement in function. 1.  []? Date:   2. Janice Cox will report 0-1/10 pain at rest and during ADLs. 2.  []? Date:   3. Janice Cox will demonstrate ability to lift at least 20 lbs from floor with safe lifting techniques, protecting lumbar spine, to enable pt to perform work activities safely. 3.  []? Date:   4. Janice Cox will be able to perform SLS >5 seconds bilaterally to help with gait and improve balance 4. []? Date:     Janice Cox will ambulate 10 min without increased LE symptoms demonstrating good stabilization and dynamics of lumbar and pelvic region.              Sit to  30 sec:  11  (1/6/21)  Neuro screen:   · DTR's: symmetrical 1+ patella B, 0 achilles B --pt tends to guard so this my not be accurate   · Derm: lateral R thigh, medial calf R mild diminished with light touch     TREATMENT:   THERAPEUTIC EXERCISE: (20 min)  Exercises per grid below to improve mobility, strength and balance. Required minimal visual, verbal and manual cues to promote proper body alignment and promote proper body posture. Progressed resistance and complexity of movement as indicated.      Date:  9/24/20 Date:  10/1/20 10/2/20 10/5/20 10/7/20 10/15/20 10/19/20  10/22/20  10/26/20 11/2/20 11/9/20  11/16/20 11/19/20 12/10/20 12/14/20 12/21/20 1/6/21   Activity/Exercise Parameters Parameters                  Education HEP, POC  New HEP, posture  Use of heel lift, new HEP  Heel lift wear     Gait: use of ankle PF to propel to decrease hip and back strain          Quad set  Standing      VMO focus, seated               Calf stretching  2 x 30 sec                    Stairs                     nustep  X 8 min level 4 10 min level 6   10 min level 3 (scifit) 10 min level 3.5 (sci fit ) 10 min level 3.5  - 10 min level 4  10 min level 4  10 min Level 4  10 min level 4  10 min level 5 10 min level 4 10 min level 4  10 min level 4  10 min level 4  10 min level 4   Prone knee hang  With manual mobilizations x 5 min                    Hip flexor stretch  Standing LE on box with quad set   Manually       Modified pascale stretch - leg off side bed, R LE 3 x 5 knee flex 10sec hold Mod Girish stretch with OP x 5 with knee flex off side of bed            iso multif 5 sec x 2 min                   iso hip flex 3 x 5 sec B                   Prone hip ext with knee at 90   X 7, 5 sec hold           Prone hip flexor 3 sec x 5        Prone ham curls  X 10 left, focus on gentle iliopsoas stretch   X 10         X 10 B   X 5    X 10 min  X 10 B   clams NV X 10, 5 sec holds HEP    Supine hooklying RTB x 10 Supine RTB with TrA brace   hooklying RTB x 10           TKE NV NV SAQ with 10# x 20 reps    At end of sit to stand  Seated LAQ with focus on TKE with VMO x 10              piriformis stretch NV 3 x 20 sec right   Seated 3 sets 3x 10 sec holds B Seated 2 x 20 sec B   2 x 20 sec B supine     RTB x 40 ft     Seated x 4     Marching   2 x 40 ft     Step tap on bolster x 1 min         In TA      Side stepping   2 x 40 ft 0 resist  YTB 2 x 40ft  NV Modify TB to above knee next session   5 x 2 steps to each side   RTB x 10    RTB 2 x 40  RTB 2 x 10   In TA      Lunge steps  2 x 40ft                   SKTC           B 3 x 20 sec           LTR  X 3 min  X 5        X 10 with OP           Bridges     X 10, 5 sec With resist band next visit    X 10   W/ RTB x 5  With ball squeeze (hip add) 2 x 10, 5 sec holds  With ball squeeze  With RTB x 10, 5sec          Treadmill    3min 30 sec 1. 9mph   2 min 1.8 mph                 Walking     4 X 3 loops  4# x 4 loops (middle [id number])  5# x 4 , 8/10    5# x 4 laps (470 ft each  Round)  6# x 120 ft, limited by pain  8# x 410 limited by pain of right hip  8# x 270 ft  8# dumbells (farmers carry) 2 x  550ft    1 x 600 ft 8#   1 x 450ft  8# 5 x 40ft focus on giat pattern and posture  4 x 20 fwd backward RTB  7 x 80 ft 5# dumbells    8# wts 5 laps (600 ft)    8# wts 3 laps  380 ft         6 min test 820 ft    2 x Sciatic nerve glide     slump test with head node x 10                Hip add    Ball squeeze seat 3 x 10 sec (2 sets)    With bridges  Ball squeeze 5 sec x 10 seated after walking to traction/pelvis  hip  Ball squeeze           Sit to stand     With 2# wand 2 x 10  3# wand 2 x 10  3# wand overhead x 10, focus on VMO and glute contraction    3 #wand overhead 2 x 10  With 8# dumbells x 10  overhead press up   Tactile cuing   10# kettle 2 x 10 reps   10# kettle bell 3 x 10 overhead press  10# kettle bell 3 x 10  X 10    Step overs       Over bolster x 10 laterals B               Calf raises        X 3 DL (pain increased of right hip)   2 x 10 wall to guide               Seated lumbar flexion          intermitently during rest breaks ~ 5x 10 sec, 3 sets  -----  With ball x 3 min         Shuttle          NV NV (occupied today)          DL             5# bar x 20   Touch downs     hinges             X 15 with dowel        Rolling               2 sets 5 UE, 2 x 5 LE 2 x 4 reps                              THERAPEUTIC ACTIVITY: (  minutes):  Activities per gid below to improve functional movement related mobility, strength and balance to improve neuro-muscular carryover to daily functional activities for improving patient's quality of life. Required visual, verbal and manual cues to promote proper body alignment and promote proper body posture/mechanics. Progressed resistance and complexity of movement as indicated. Date:  9/21/2020 Date:  10/15/20 Date:  11/9/20 11/16/20 11/19/20 12/10    Activity/Exercise Parameters Parameters Parameters      walking Focus on heel strike  Demo-do: heel strike Decreased step length, wider TEODORO 5# dumbells  Decreased step length and TEODORO 5# dumbells  163/82 BP  108 HR  8/10 hip pain    Butt kicks, side steps, high knees, norm gait    Posture  Cuing for weight bearing  Cuing to decrease cervical and thoracic flexion         Cuing thoughout                                                                                        MANUAL THERAPY: (25 minutes): Joint mobilization, Soft tissue mobilization was utilized and necessary because of the patient's restricted joint motion and restricted motion of soft tissue mobility.         Date  1/6/21       Technique Used Grade  Level # Time(s) Effect while being performed   PA's IV-- prox sacum, T-L junction  3 bouts  decreased joint stiffness    AP's  ---------- ------------------ Not today  Decreased joint stiffness    Hip distraction with rotation    Right, in supine, use of belt   2 x 5 bouts   Increased joint space, decreased stiffness    Hip medial/lateral  III Right hip, knee bent to 90 deg (prone) 3 bouts  Decrease hip hip joint stiffness    STM/MFR Skin glide and rolling  Along lumbar surg scar and surrounding area  X 15 min   Decreased adhesions affecting movement of spine and muscle     Deep MFR/ trigger point release   Bilateral lumbar paraspinal   x 10  Decrease muscle muscle spasm             Modalities: ( minutes) to reduce inflammation    · Ice application to lower lumbar x 10 min, supine with leg wedge               HEP Log Date 1. Standing hip flex stretch  9/21/2020   2. Knee hang QS 9/21/2020   3. Piriformis stretch  1/6/2021      4. Heel strike with gait  1/6/2021      5. iso hip flex 1/6/2021    6. Clamshell  1/6/2021     7. Piriformis     Marching     Bridges  1/6/2021        [facility] Portal  Treatment/Session Summary:    Response to Treatment:  focused reevaluation and on manual techniques today to decrease scar adhesion  and muscle tone as this was very helpful previously. Decreased pain at end of session. See recert for further objective        Communication/Consultation:   ---   Equipment provided today:  ----     Recommendations/Intent for next treatment session:   Next visit will focus on Manual Therapy Core Stability Quad strengthening Hip strengthening. Add t-band to bridges, continue with hip and core strengthening and walking tolerance. Manual hip/spine/knee mobs and DTM as needed         Treatment Plan of Care Effective Dates: 9/21/2020 TO 12/21/2020 (90 days).   Frequency/Duration: 2 times a week for 90 Days       Total Treatment Billable Duration: 45 min      PT Patient Time In/Time Out  Time In: 0900  Time Out: 0945  My Morales, HALLEY    Future Appointments   Date Time Provider Aria Carrillo   1/8/2021  9:00 AM Alicia Perch, PT [facility]   1/13/2021  9:00 AM Alicia Perch, PT [facility]   1/15/2021  9:00 AM Alicia Perch, PT [facility]   1/18/2021  9:00 AM Yane Burciaga MD [facility]   1/20/2021  9:00 AM Alicia Perch, PT [facility]   1/22/2021  9:00 AM Alicia Perch, PT [facility]   3/29/2021  9:10 AM Matias Rios MD [facility]

## 2021-01-08 ENCOUNTER — HOSPITAL ENCOUNTER (OUTPATIENT)
Dept: PHYSICAL THERAPY | Age: 56
Discharge: HOME | End: 2021-01-08
Payer: COMMERCIAL

## 2021-01-08 NOTE — PROGRESS NOTES
Eliel Salcedombjazmín  : 1965  Primary: Dania Sousa [facility]  Secondary:  [address] at [place] [address]. [address], [address]  [phone]   [phone]      OUTPATIENT DAILY NOTE    NAME/AGE/GENDER: Mariya French is a 60 y.o. male. DATE: 12/3/2020      Minda CANCELED for today's appointment    Prakash Sam PT

## 2021-01-13 ENCOUNTER — HOSPITAL ENCOUNTER (OUTPATIENT)
Dept: PHYSICAL THERAPY | Age: 56
Discharge: HOME | End: 2021-01-13
Payer: COMMERCIAL

## 2021-01-13 NOTE — PROGRESS NOTES
Jayy Boltonjazmín  : 1965  Primary: [facility]  Secondary:  [address] at . [address]  [address], [address]  [phone]   [phone]      OUTPATIENT DAILY NOTE    NAME/AGE/GENDER: Mavis Daley is a 60 y.o. male. DATE: 12/3/2020    Mr. Perla CANCELED for today's appointment    Sarabjit Presume, PT

## 2021-01-15 ENCOUNTER — HOSPITAL ENCOUNTER (OUTPATIENT)
Dept: PHYSICAL THERAPY | Age: 56
Discharge: HOME | End: 2021-01-15
Payer: COMMERCIAL

## 2021-01-15 PROCEDURE — 97140 MANUAL THERAPY 1/> REGIONS: CPT

## 2021-01-15 PROCEDURE — 97110 THERAPEUTIC EXERCISES: CPT

## 2021-01-15 NOTE — PROGRESS NOTES
Cal Perla  : 1965  Payor: [id number] / Plan: [address] / Product Type: PPO /  [address] [place], [address]  [phone]   [phone]                                                          Ramon Agrawal MD      OUTPATIENT PHYSICAL THERAPY: Daily Treatment Note 1/15/2021   Visit Count:  19    Tx Diagnosis:  Pain in Right Hip (M25.551)  Stiffness of Right Hip, Not elsewhere classified (M25.651)  Stiffness of Right Knee, Not elsewhere classified (M25.661)  Other abnormalities of gait and mobility (R26.89)  Low Back Pain (M54.5)  Abnormal posture (R29.3)   Spondylolisthesis at L3-L4 level [M43.16]  Lumbar stenosis with neurogenic claudication [M48.062]      Pre-treatment Symptoms/Complaints: no change. Constant pain of low back. Has increased right buttock pain today affecting how sits and moves. 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 Reyna Medel will be independent with HEP 1.  [x]? Date:10/26/2020    2. Reyna Medel will participate in LE stretching program to increase flexibility as well as core stabilization exercises to help with maintaining spinal alignment and joint mechanics during ADLs and work related activities. 2.  [x]? Date:10/26/2020    3. Reyna Medel will report improved sleep consistently over a 1-2 week period. 3.  [x]? Date:10/26/2020    4. Reyna Medel will participate in LE strengthening program with weights/resistance as appropriate to help with gait, ADLS and work related activities. 4.  [x]? Date:10/26/2020    5. Jeremiah Murphy will participate in static and dynamic balance activities to decrease the risk for falls and improve lumbar/pelvic stabilization mechanics. 5.  [x]? Date:10/26/2020    6. Jeremiah Murphy will tolerate manual therapy/joint mobilizations/soft tissue to increase ROM and decrease pain     6. [x]? Date:10/26/2020       Genekrystal Murphy will demonstrate a 5 degree improvement of right knee extension (or obtain heel lift for right shoe if not possible to improve knee ROM) to increase postural/spinal alignment. [x]? Date:10/26/2020              Long Term Goals~8 weeks Goal Met   1. Jeremiah Murphy will demonstrate a 15 point improvement on the Oswestry to show improvement in function. 1.  []? Date:   2. Jeremiah Murphy will report 0-1/10 pain at rest and during ADLs. 2.  []? Date:   3. Jeremiah Murphy will demonstrate ability to lift at least 20 lbs from floor with safe lifting techniques, protecting lumbar spine, to enable pt to perform work activities safely. 3.  []? Date:   4. Jeremiah Murphy will be able to perform SLS >5 seconds bilaterally to help with gait and improve balance 4. []? Date:     Jeremiah Murphy will ambulate 10 min without increased LE symptoms demonstrating good stabilization and dynamics of lumbar and pelvic region.              Sit to  30 sec:  11  (1/6/21)  Neuro screen:   · DTR's: symmetrical 1+ patella B, 0 achilles B --pt tends to guard so this my not be accurate   · Derm: lateral R thigh, medial calf R mild diminished with light touch     TREATMENT:   THERAPEUTIC EXERCISE: (25 min)  Exercises per grid below to improve mobility, strength and balance. Required minimal visual, verbal and manual cues to promote proper body alignment and promote proper body posture. Progressed resistance and complexity of movement as indicated.      10/22/20  10/26/20 11/2/20 11/9/20  11/16/20 11/19/20 12/10/20 12/14/20 12/21/20 1/6/21 1/15/21   Activity/Exercise              Education   Gait: use of ankle PF to propel to decrease hip and back strain           Quad set               Calf stretching               Stairs               nustep  10 min level 4  10 min level 4  10 min Level 4  10 min level 4  10 min level 5 10 min level 4 10 min level 4  10 min level 4  10 min level 4  10 min level 4 10 min level 4 (scifit)   Prone knee hang               Hip flexor stretch   Modified pascale stretch - leg off side bed, R LE 3 x 5 knee flex 10sec hold Mod Girish stretch with OP x 5 with knee flex off side of bed             iso multif              iso hip flex              Prone hip ext with knee at 90      Prone hip flexor 3 sec x 5      X 5    Prone ham curls     X 10 B   X 5    X 10 min  X 10 B X 5 during MFR   clams Supine RTB with TrA brace   hooklying RTB x 10            TKE              piriformis stretch    RTB x 40 ft     Seated x 4      Marching        In TA       Side stepping  5 x 2 steps to each side   RTB x 10    RTB 2 x 40  RTB 2 x 10   In TA       Lunge steps              SKTC    B 3 x 20 sec            LTR   X 10 with OP            Bridges  With ball squeeze (hip add) 2 x 10, 5 sec holds  With ball squeeze  With RTB x 10, 5sec           Walking  8# x 410 limited by pain of right hip  8# x 270 ft  8# dumbells (farmers carry) 2 x  550ft    1 x 600 ft 8#   1 x 450ft  8# 5 x 40ft focus on giat pattern and posture  4 x 20 fwd backward RTB  7 x 80 ft 5# dumbells    8# wts 5 laps (600 ft)    8# wts 3 laps  380 ft         6 min test 820 ft  8# wt 360ft    2 x Sciatic nerve glide              Hip add With bridges  Ball squeeze 5 sec x 10 seated after walking to traction/pelvis  hip  Ball squeeze            Sit to stand   3 #wand overhead 2 x 10  With 8# dumbells x 10  overhead press up   Tactile cuing   10# kettle 2 x 10 reps   10# kettle bell 3 x 10 overhead press  10# kettle bell 3 x 10  X 10     Step overs Calf raises    2 x 10 wall to guide                Seated lumbar flexion   intermitently during rest breaks ~ 5x 10 sec, 3 sets  -----  With ball x 3 min          Shuttle   NV NV (occupied today)           DL      5# bar x 20   Touch downs        2 x 10 10# bell    Hip hinges      X 15 with dowel      X 15    Rolling        2 sets 5 UE, 2 x 5 LE 2 x 4 reps                         THERAPEUTIC ACTIVITY: (  minutes): Activities per gid below to improve functional movement related mobility, strength and balance to improve neuro-muscular carryover to daily functional activities for improving patient's quality of life. Required visual, verbal and manual cues to promote proper body alignment and promote proper body posture/mechanics. Progressed resistance and complexity of movement as indicated. Date:  9/21/2020 Date:  10/15/20 Date:  11/9/20 11/16/20 11/19/20 12/10    Activity/Exercise Parameters Parameters Parameters      walking Focus on heel strike  Demo-do: heel strike Decreased step length, wider TEODORO 5# dumbells  Decreased step length and TEODORO 5# dumbells  163/82 BP  108 HR  8/10 hip pain    Butt kicks, side steps, high knees, norm gait    Posture  Cuing for weight bearing  Cuing to decrease cervical and thoracic flexion         Cuing thoughout                                                                                        MANUAL THERAPY: (20 minutes): Joint mobilization, Soft tissue mobilization was utilized and necessary because of the patient's restricted joint motion and restricted motion of soft tissue mobility.         Date  1/15/21       Technique Used Grade  Level # Time(s) Effect while being performed   PA's IV-- prox sacum, T-L junction  3 bouts  decreased joint stiffness    AP's  ---------- ------------------      Hip distraction with rotation      NOT TODAY   Increased joint space, decreased stiffness    Hip medial/lateral  III Right hip, knee bent to 90 deg (prone) 3 bouts  Decrease hip hip joint stiffness    STM/MFR 1. Skin glide and rolling   2.deep  Along lumbar surg scar and surrounding area  X 10 min   Decreased adhesions affecting movement of spine and muscle              Modalities: ( 10 minutes) to reduce inflammation    · Ice application to lower lumbar x 10 min, supine with leg wedge               HEP Log Date 1. Standing hip flex stretch  9/21/2020   2. Knee hang QS 9/21/2020   3. Piriformis stretch  1/15/2021      4. Heel strike with gait  1/15/2021      5. iso hip flex 1/15/2021    6. Clamshell  1/15/2021     7. Piriformis     Marching     Bridges  1/15/2021        [employer] Portal  Treatment/Session Summary:    Response to Treatment: Pt demonstrating good gait pattern today. Required some cuing with weighted dead lift regarding hip hinge and isometric lumbar stabilization. Encouraged these mechanics to be utilized at work. Pt responded well to manual and modalities today with a reduction of pain level upon departure. Will discuss continued constant pain with MD next week. continue POC until then. Communication/Consultation:   ---   Equipment provided today:  ----     Recommendations/Intent for next treatment session:   Next visit will focus on Manual Therapy Core Stability Quad strengthening Hip strengthening. Add t-band to bridges, continue with hip and core strengthening and walking tolerance.  Manual hip/spine/knee mobs and DTM as needed         Treatment Plan of Care Effective Dates: 1/6/21-2/5/2021   Frequency/Duration: 2 times a week       Total Treatment Billable Duration: 45 min      PT Patient Time In/Time Out  Time In: 0900  Time Out: [facility], PT    Future Appointments   Date Time Provider Aria Carrillo   1/18/2021  9:00 AM [facility]   1/20/2021  9:00 AM Marques Cuevas, PT [facility]   1/22/2021  9:00 AM Marques Cuevas PT [facility]   3/29/2021 9:10 AM Trinidad Cisse MD [facility]

## 2021-01-20 ENCOUNTER — HOSPITAL ENCOUNTER (OUTPATIENT)
Dept: PHYSICAL THERAPY | Age: 56
Discharge: HOME | End: 2021-01-20
Payer: COMMERCIAL

## 2021-01-20 PROCEDURE — 97110 THERAPEUTIC EXERCISES: CPT

## 2021-01-20 PROCEDURE — 97140 MANUAL THERAPY 1/> REGIONS: CPT

## 2021-01-20 NOTE — PROGRESS NOTES
Rosario Perla  : 1965  Payor: [id number] / Plan: [address] / Product Type: PPO /  [facility] at [address] [place] [address]. Lucianrejimarie Doug, [address], [id number] [place] [address] Road  [phone]   [phone]                                                          Osmar Cohen MD      OUTPATIENT PHYSICAL THERAPY: Daily Treatment Note 2021   Visit Count:  20    Tx Diagnosis:  Pain in Right Hip (M25.551)  Stiffness of Right Hip, Not elsewhere classified (M25.651)  Stiffness of Right Knee, Not elsewhere classified (M25.661)  Other abnormalities of gait and mobility (R26.89)  Low Back Pain (M54.5)  Abnormal posture (R29.3)   Spondylolisthesis at L3-L4 level [M43.16]  Lumbar stenosis with neurogenic claudication [M48.062]      Pre-treatment Symptoms/Complaints: pt states some relief  (to 4/10) back pain for ~ 1 day after last treatment session. The more acute pain of right buttock has been less painful since last session. 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 Prema Ferrara will be independent with HEP 1.  [x]? Date:10/26/2020    2. Prema Ferrara will participate in LE stretching program to increase flexibility as well as core stabilization exercises to help with maintaining spinal alignment and joint mechanics during ADLs and work related activities. 2.  [x]? Date:10/26/2020    3. Prema Ferrara will report improved sleep consistently over a 1-2 week period. 3.  [x]? Date:10/26/2020    4.  Prema Ferrara will participate in LE strengthening program with weights/resistance as appropriate to help with gait, ADLS and work related activities. 4.  [x]? Date:10/26/2020    5. Britt Guzman will participate in static and dynamic balance activities to decrease the risk for falls and improve lumbar/pelvic stabilization mechanics. 5.  [x]? Date:10/26/2020    6. Britt Guzman will tolerate manual therapy/joint mobilizations/soft tissue to increase ROM and decrease pain     6. [x]? Date:10/26/2020       Britt Guzman will demonstrate a 5 degree improvement of right knee extension (or obtain heel lift for right shoe if not possible to improve knee ROM) to increase postural/spinal alignment. [x]? Date:10/26/2020              Long Term Goals~8 weeks Goal Met   1. Britt Guzman will demonstrate a 15 point improvement on the Oswestry to show improvement in function. 1.  []? Date:   2. Britt Guzman will report 0-1/10 pain at rest and during ADLs. 2.  []? Date:   3. Britt Guzman will demonstrate ability to lift at least 20 lbs from floor with safe lifting techniques, protecting lumbar spine, to enable pt to perform work activities safely. 3.  []? Date:   4. Britt Guzman will be able to perform SLS >5 seconds bilaterally to help with gait and improve balance 4. []? Date:     Britt Guzman will ambulate 10 min without increased LE symptoms demonstrating good stabilization and dynamics of lumbar and pelvic region.              Sit to  30 sec:  11  (1/6/21)  Neuro screen:   · DTR's: symmetrical 1+ patella B, 0 achilles B --pt tends to guard so this my not be accurate   · Derm: lateral R thigh, medial calf R mild diminished with light touch     TREATMENT:   THERAPEUTIC EXERCISE: (30 min)  Exercises per grid below to improve mobility, strength and balance. Required minimal visual, verbal and manual cues to promote proper body alignment and promote proper body posture. Progressed resistance and complexity of movement as indicated.      10/22/20  10/26/20 11/2/20 11/9/20  11/16/20 11/19/20 12/10/20 12/14/20 12/21/20 1/6/21 1/15/21 1/20/21   Activity/Exercise               Education   Gait: use of ankle PF to propel to decrease hip and back strain            Quad set                Calf stretching                Stairs                nustep  10 min level 4  10 min level 4  10 min Level 4  10 min level 4  10 min level 5 10 min level 4 10 min level 4  10 min level 4  10 min level 4  10 min level 4 10 min level 4 (scifit) 10 min level 4    Prone knee hang                Hip flexor stretch   Modified pascale stretch - leg off side bed, R LE 3 x 5 knee flex 10sec hold Mod Girish stretch with OP x 5 with knee flex off side of bed              iso multif               iso hip flex               Prone hip ext with knee at 90      Prone hip flexor 3 sec x 5      X 5     Prone ham curls     X 10 B   X 5    X 10 min  X 10 B X 5 during MFR    clams Supine RTB with TrA brace   hooklying RTB x 10             TKE               piriformis stretch    RTB x 40 ft     Seated x 4       Marching        In TA        Side stepping  5 x 2 steps to each side   RTB x 10    RTB 2 x 40  RTB 2 x 10   In TA     RTB x 30 ft F/B     Lunge steps               SKTC    B 3 x 20 sec             LTR   X 10 with OP             Bridges  With ball squeeze (hip add) 2 x 10, 5 sec holds  With ball squeeze  With RTB x 10, 5sec            Walking  8# x 410 limited by pain of right hip  8# x 270 ft  8# dumbells (farmers carry) 2 x  550ft    1 x 600 ft 8#   1 x 450ft  8# 5 x 40ft focus on giat pattern and posture  4 x 20 fwd backward RTB  7 x 80 ft 5# dumbells    8# wts 5 laps (600 ft)    8# wts 3 laps  380 ft         6 min test 820 ft  8# wt 360ft     2 x Sciatic nerve glide               Hip add With bridges  Ball squeeze 5 sec x 10 seated after walking to traction/pelvis  hip  Ball squeeze             Sit to stand   3 #wand overhead 2 x 10  With 8# dumbells x 10  overhead press up   Tactile cuing   10# kettle 2 x 10 reps   10# kettle bell 3 x 10 overhead press  10# kettle bell 3 x 10  X 10   15# bell 3 x 8 touchdowns overhead press   Step overs                Calf raises    2 x 10 wall to guide                 Seated lumbar flexion   intermitently during rest breaks ~ 5x 10 sec, 3 sets  -----  With ball x 3 min           Shuttle   NV NV (occupied today)            DL      5# bar x 20   Touch downs        2 x 10 10# bell     Hip hinges      X 15 with dowel      X 15     Rolling        2 sets 5 UE, 2 x 5 LE 2 x 4 reps                           THERAPEUTIC ACTIVITY: (  minutes): Activities per gid below to improve functional movement related mobility, strength and balance to improve neuro-muscular carryover to daily functional activities for improving patient's quality of life. Required visual, verbal and manual cues to promote proper body alignment and promote proper body posture/mechanics. Progressed resistance and complexity of movement as indicated. Date:  9/21/2020 Date:  10/15/20 Date:  11/9/20 11/16/20 11/19/20 12/10    Activity/Exercise Parameters Parameters Parameters      walking Focus on heel strike  Demo-do: heel strike Decreased step length, wider TEODORO 5# dumbells  Decreased step length and TEODORO 5# dumbells  163/82 BP  108 HR  8/10 hip pain    Butt kicks, side steps, high knees, norm gait    Posture  Cuing for weight bearing  Cuing to decrease cervical and thoracic flexion         Cuing thoughout                                                                                        MANUAL THERAPY: (15 minutes): Joint mobilization, Soft tissue mobilization was utilized and necessary because of the patient's restricted joint motion and restricted motion of soft tissue mobility.         Date  1/20/21       Technique Used Grade  Level # Time(s) Effect while being performed   PA's IV-- prox sacum, T-L junction  3 bouts  decreased joint stiffness    AP's  ---------- ------------------      Hip distraction with rotation       Increased joint space, decreased stiffness    Hip medial/lateral  III Right hip, knee bent to 90 deg (prone) 3 bouts  Decrease hip hip joint stiffness    STM/MFR 1. Skin glide and rolling   2.deep  Along lumbar surg scar and surrounding area  X 10 min   Decreased adhesions affecting movement of spine and muscle              Modalities: ( 10 minutes) to reduce inflammation    · Ice application to lower lumbar x 10 min, supine with leg wedge               HEP Log Date 1. Standing hip flex stretch  9/21/2020   2. Knee hang QS 9/21/2020   3. Piriformis stretch  1/20/2021      4. Heel strike with gait  1/20/2021      5. iso hip flex 1/20/2021    6. Clamshell  1/20/2021     7. Piriformis     Marching     Bridges  1/20/2021        [employer] Portal  Treatment/Session Summary:    Response to Treatment: Pt challenged but completed increased resist and reps of above exercises today without report of increased pain level. Manual techniques have been helpful with decreasing tissue and joint restriction and relieving pain and will continue incorporating into sessions as needed          Communication/Consultation:   ---   Equipment provided today:  ----     Recommendations/Intent for next treatment session:   Next visit will focus on Manual Therapy Core Stability Quad strengthening Hip strengthening. Add t-band to bridges, continue with hip and core strengthening and walking tolerance.  Manual hip/spine/knee mobs and DTM as needed         Treatment Plan of Care Effective Dates: 1/6/21-2/5/2021   Frequency/Duration: 2 times a week       Total Treatment Billable Duration: 45 min      PT Patient Time In/Time Out  Time In: 0900  Time Out: 0945  My Morales PT    Future Appointments   Date Time Provider Aria Carrillo   1/22/2021  9:00 AM Alicia Gregg PT [facility]   3/29/2021  9:10 AM Matias Rios MD [facility]

## 2021-01-22 ENCOUNTER — HOSPITAL ENCOUNTER (OUTPATIENT)
Dept: PHYSICAL THERAPY | Age: 56
Discharge: HOME | End: 2021-01-22
Payer: COMMERCIAL

## 2021-01-22 PROCEDURE — 97110 THERAPEUTIC EXERCISES: CPT

## 2021-01-22 PROCEDURE — 97140 MANUAL THERAPY 1/> REGIONS: CPT

## 2021-01-22 NOTE — PROGRESS NOTES
Barbi Perla  : 1965  Payor: [address] [place] [address] / Plan: [address] / Product Type: O /  [address] [place] [address] at [address] [place] [address]. Solange King., [address], [address] [place] [address]  [phone]   [phone]                                                          Marni Cadena MD      OUTPATIENT PHYSICAL THERAPY: Daily Treatment Note 2021   Visit Count:  21    Tx Diagnosis:  Pain in Right Hip (M25.551)  Stiffness of Right Hip, Not elsewhere classified (M25.651)  Stiffness of Right Knee, Not elsewhere classified (M25.661)  Other abnormalities of gait and mobility (R26.89)  Low Back Pain (M54.5)  Abnormal posture (R29.3)   Spondylolisthesis at L3-L4 level [M43.16]  Lumbar stenosis with neurogenic claudication [M48.062]      Pre-treatment Symptoms/Complaints: \"my legs hurt today, like they have been beat up\". To see MD today at 2:30 to discuss low back and hip pain. 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 Soledad Cuenca will be independent with HEP 1.  [x]? Date:10/26/2020    2. Soledad Cuenca will participate in LE stretching program to increase flexibility as well as core stabilization exercises to help with maintaining spinal alignment and joint mechanics during ADLs and work related activities. 2.  [x]? Date:10/26/2020    3. Soledad Cuenca will report improved sleep consistently over a 1-2 week period. 3.  [x]? Date:10/26/2020    4.  Soledad Cuenca will participate in LE strengthening program with weights/resistance as appropriate to help with gait, ADLS and work related activities. 4.  [x]? Date:10/26/2020    5. Soledad Cuenca will participate in static and dynamic balance activities to decrease the risk for falls and improve lumbar/pelvic stabilization mechanics. 5.  [x]? Date:10/26/2020    6. Soledad Cuenca will tolerate manual therapy/joint mobilizations/soft tissue to increase ROM and decrease pain     6. [x]? Date:10/26/2020       Soledad Cuenca will demonstrate a 5 degree improvement of right knee extension (or obtain heel lift for right shoe if not possible to improve knee ROM) to increase postural/spinal alignment. [x]? Date:10/26/2020              Long Term Goals~8 weeks Goal Met   1. Soledad Cuenca will demonstrate a 15 point improvement on the Oswestry to show improvement in function. 1.  []? Date:   2. Soledad Cuenca will report 0-1/10 pain at rest and during ADLs. 2.  []? Date:   3. Soledad Cuenca will demonstrate ability to lift at least 20 lbs from floor with safe lifting techniques, protecting lumbar spine, to enable pt to perform work activities safely. 3.  []? Date:   4. Soledad Cuenca will be able to perform SLS >5 seconds bilaterally to help with gait and improve balance 4. []? Date:     Soledad Cuenca will ambulate 10 min without increased LE symptoms demonstrating good stabilization and dynamics of lumbar and pelvic region.              Sit to  30 sec:  11  (1/6/21)  OSWESTRY low back scale: 28/50 (1/22/21)  Neuro screen:   · DTR's: symmetrical 1+ patella B, 0 achilles B --pt tends to guard so this my not be accurate   · Derm: lateral R thigh, medial calf R mild diminished with light touch     TREATMENT:   THERAPEUTIC EXERCISE: (25 min)  Exercises per grid below to improve mobility, strength and balance.   Required minimal visual, verbal and manual cues to promote proper body alignment and promote proper body posture. Progressed resistance and complexity of movement as indicated.      10/22/20  10/26/20 11/2/20 11/9/20  11/16/20 11/19/20 12/10/20 12/14/20 12/21/20 1/6/21 1/15/21 1/20/21 1/22/21   Activity/Exercise                Education   Gait: use of ankle PF to propel to decrease hip and back strain             Quad set                 Calf stretching                 Stairs                 nustep  10 min level 4  10 min level 4  10 min Level 4  10 min level 4  10 min level 5 10 min level 4 10 min level 4  10 min level 4  10 min level 4  10 min level 4 10 min level 4 (scifit) 10 min level 4  10 min level 4 scifit    Prone knee hang                 Hip flexor stretch   Modified pascale stretch - leg off side bed, R LE 3 x 5 knee flex 10sec hold Mod Girish stretch with OP x 5 with knee flex off side of bed               iso multif                iso hip flex                Prone hip ext with knee at 90      Prone hip flexor 3 sec x 5      X 5      Prone ham curls     X 10 B   X 5    X 10 min  X 10 B X 5 during MFR     clams Supine RTB with TrA brace   hooklying RTB x 10              TKE                piriformis stretch    RTB x 40 ft     Seated x 4        Marching        In TA         Side stepping  5 x 2 steps to each side   RTB x 10    RTB 2 x 40  RTB 2 x 10   In TA     RTB x 30 ft F/B      Lunge steps                SKTC    B 3 x 20 sec              LTR   X 10 with OP              Bridges  With ball squeeze (hip add) 2 x 10, 5 sec holds  With ball squeeze  With RTB x 10, 5sec             Walking  8# x 410 limited by pain  right hip  8# x 270 ft  8# dumbells (farmers carry) 2 x  550ft    1 x 600 ft 8#   1 x 450ft  8# 5 x 40ft focus on giat pattern and posture  4 x 20 fwd backward RTB  7 x 80 ft 5# dumbells    8# wts 5 laps (600 ft)    8# wts 3 laps  380 ft         6 min test 820 ft  8# wt 360ft   (2) 6# dumbells held at chin level x 800 ft    2 x Sciatic nerve glide                Hip add With bridges  Ball squeeze 5 sec x 10 seated after walking to traction/pelvis  hip  Ball squeeze              Sit to stand   3 #wand overhead 2 x 10  With 8# dumbells x 10  overhead press up   Tactile cuing   10# kettle 2 x 10 reps   10# kettle bell 3 x 10 overhead press  10# kettle bell 3 x 10  X 10   15# bell 3 x 8 touchdowns overhead press 6 # dumbells (2) held in \"W\"   3 x 8    Step overs                 Calf raises    2 x 10 wall to guide                  Seated lumbar flexion   intermitently during rest breaks ~ 5x 10 sec, 3 sets  -----  With ball x 3 min            Shuttle   NV NV (occupied today)             DL      5# bar x 20   Touch downs        2 x 10 10# bell      Hip hinges      X 15 with dowel      X 15      Rolling        2 sets 5 UE, 2 x 5 LE 2 x 4 reps         Open book              5 B, 10 sec        THERAPEUTIC ACTIVITY: (  minutes): Activities per gid below to improve functional movement related mobility, strength and balance to improve neuro-muscular carryover to daily functional activities for improving patient's quality of life. Required visual, verbal and manual cues to promote proper body alignment and promote proper body posture/mechanics. Progressed resistance and complexity of movement as indicated.      Date:  9/21/2020 Date:  10/15/20 Date:  11/9/20 11/16/20 11/19/20 12/10    Activity/Exercise Parameters Parameters Parameters      walking Focus on heel strike  Demo-do: heel strike Decreased step length, wider TEODORO 5# dumbells  Decreased step length and TEODORO 5# dumbells  163/82 BP  108 HR  8/10 hip pain    Butt kicks, side steps, high knees, norm gait    Posture  Cuing for weight bearing  Cuing to decrease cervical and thoracic flexion         Cuing thoughout                                                                                        MANUAL THERAPY: (20 minutes): Joint mobilization, Soft tissue mobilization was utilized and necessary because of the patient's restricted joint motion and restricted motion of soft tissue mobility. Date  1/22/21       Technique Used Grade  Level # Time(s) Effect while being performed   PA's IV-- prox sacum, T-L junction  3 bouts  decreased joint stiffness    AP's  ---------- ------------------      Hip distraction with rotation       Increased joint space, decreased stiffness    Hip medial/lateral  III Right hip, knee bent to 90 deg (prone) 3 bouts  Decrease hip hip joint stiffness    STM/MFR 1. Skin glide and rolling   2.deep  Along lumbar surg scar and surrounding area  X 10 min   Decreased adhesions affecting movement of spine and muscle              Modalities: ( 10 minutes) to reduce inflammation    · Ice application to lower lumbar x 10 min, supine with leg wedge  (Post session)              HEP Log Date 1. Standing hip flex stretch  9/21/2020   2. Knee hang QS 9/21/2020   3. Piriformis stretch  1/22/2021      4. Heel strike with gait  1/22/2021      5. iso hip flex 1/22/2021    6. Clamshell  1/22/2021     7. Piriformis     Marching     Bridges  1/22/2021        [employer] Portal  Treatment/Session Summary:    Response to Treatment: Pt increased pain during last 100ft during walking activity (holding dumb bells in W). Suggests core muscle fatigue. Pt still limited by right hip pain with most weightbearing activities. Pt to see MD today, I encouraged pt to discuss pain levels of low back with only temporary relief from PT as well as limitations that right hip causes. Pt will contact us to inform of PT continuation or d/c after MD visit. Communication/Consultation:   ---   Equipment provided today:  ----     Recommendations/Intent for next treatment session:   Next visit will focus on Manual Therapy Core Stability Quad strengthening Hip strengthening. Add t-band to bridges, continue with hip and core strengthening and walking tolerance.  Manual hip/spine/knee mobs and DTM as needed         Treatment Plan of Care Effective Dates: 1/6/21-2/5/2021   Frequency/Duration: 2 times a week       Total Treatment Billable Duration: 45 min      PT Patient Time In/Time Out  Time In: 0900  Time Out: [facility], PT    Future Appointments   Date Time Provider Aria Carrillo   3/29/2021  9:10 AM MD HONEY Hyatt

## 2021-08-25 ENCOUNTER — HOSPITAL ENCOUNTER (OUTPATIENT)
Dept: GENERAL RADIOLOGY | Age: 56
Discharge: HOME | End: 2021-08-25
Payer: COMMERCIAL

## 2021-08-25 DIAGNOSIS — M54.50 ACUTE BILATERAL LOW BACK PAIN WITHOUT SCIATICA: ICD-10-CM

## 2021-08-25 DIAGNOSIS — M43.16 SPONDYLOLISTHESIS AT L3-L4 LEVEL: ICD-10-CM

## 2021-08-25 DIAGNOSIS — E66.09 CLASS 1 OBESITY DUE TO EXCESS CALORIES WITHOUT SERIOUS COMORBIDITY WITH BODY MASS INDEX (BMI) OF 33.0 TO 33.9 IN ADULT: ICD-10-CM

## 2021-08-25 PROCEDURE — 72100 X-RAY EXAM L-S SPINE 2/3 VWS: CPT

## 2021-09-24 ENCOUNTER — HOSPITAL ENCOUNTER (OUTPATIENT)
Dept: MRI IMAGING | Age: 56
Discharge: HOME | End: 2021-09-24
Attending: NEUROLOGICAL SURGERY
Payer: COMMERCIAL

## 2021-09-24 DIAGNOSIS — M43.16 SPONDYLOLISTHESIS AT L3-L4 LEVEL: ICD-10-CM

## 2021-09-24 DIAGNOSIS — M48.062 LUMBAR STENOSIS WITH NEUROGENIC CLAUDICATION: ICD-10-CM

## 2021-09-24 DIAGNOSIS — S39.012A STRAIN OF LUMBAR REGION, INITIAL ENCOUNTER: ICD-10-CM

## 2021-09-24 DIAGNOSIS — M54.50 ACUTE BILATERAL LOW BACK PAIN WITHOUT SCIATICA: ICD-10-CM

## 2021-09-24 PROCEDURE — 72148 MRI LUMBAR SPINE W/O DYE: CPT

## 2021-10-27 ENCOUNTER — HOSPITAL ENCOUNTER (OUTPATIENT)
Dept: PHYSICAL THERAPY | Age: 56
Discharge: HOME | End: 2021-10-27
Payer: COMMERCIAL

## 2021-10-27 DIAGNOSIS — M54.50 LOW BACK PAIN, UNSPECIFIED BACK PAIN LATERALITY, UNSPECIFIED CHRONICITY, UNSPECIFIED WHETHER SCIATICA PRESENT: ICD-10-CM

## 2021-10-27 PROCEDURE — 97530 THERAPEUTIC ACTIVITIES: CPT

## 2021-10-27 PROCEDURE — 97162 PT EVAL MOD COMPLEX 30 MIN: CPT

## 2021-10-27 NOTE — THERAPY EVALUATION
Loris Dancer Trombini  : 1965  Primary: Jatin Hudson Of Oma[facility] Peralta*  Secondary:  [facility] [place] [address]  [address] [place] [address], [address] [facility]  [phone]   [phone]      OUTPATIENT PHYSICAL THERAPY:Initial [id number]/    ICD-10: Treatment Diagnosis:   Low back pain (M54.5)  Other Spondylosis with Radiculopathy, Lumbar region (M47.27)  Intervertebral disc disorders with radiculopathy, lumbar region (M51.16)  Muscle Weakness, Generalized (M62.81)  Fusion of spine, lumbar region (M43.26)  Abnormal posture (R29.3)    Precautions/Allergies:   Patient has no known allergies. MD Orders: Eval and Treat MEDICAL/REFERRING DIAGNOSIS:  Low back pain, unspecified back pain laterality, unspecified chronicity, unspecified whether sciatica present [M54.50]   DATE OF ONSET: chronic  REFERRING PHYSICIAN: Gustavo Munoz MD  RETURN PHYSICIAN APPOINTMENT: TBD by patient      INITIAL ASSESSMENT:    Mr. Alexx Wall presents to physical therapy with functional limitations related to low back and B leg pain. with a history of lumbar laminectomy x 2, and chronic pain limiting activities over last 3 years,  PT evaluation today reveals right knee flexion contracture resulting in hip flexor restriction, asymmetrical pelvic alignment, leg length discrepancy, generalized core and LE weakness, poor posture, and asymmetrical- abnormal gait. Alexx Wall will benefit from skilled PT (medically necessary) to address above deficits affecting participation in basic ADLs, work related activities as well as recreational home activities. PROBLEM LIST (Impacting functional limitations):  1. Decreased Strength  2. Decreased ADL/Functional Activities  3. Decreased Transfer Abilities  4. Increased Pain  5. Decreased Activity Tolerance  6. Increased Fatigue  7. Increased Shortness of Breath  8. Decreased Flexibility/Joint Mobility  9.  Decreased [place] with Home Exercise Program INTERVENTIONS PLANNED:  1. Balance Exercise  2. Bed Mobility  3. Cryotherapy  4. Family Education  5. Gait Training  6. Heat  7. Home Exercise Program (HEP)  8. Manual Therapy  9. Neuromuscular Re-education/Strengthening  10. Range of Motion (ROM)  11. Therapeutic Activites  12. Therapeutic Exercise/Strengthening  13. Transfer Training   TREATMENT PLAN:  Effective Dates: 10/27/2021 TO 1/25/2022 (90 days). Frequency/Duration: 2 times a week for 90 Days  GOALS: (Goals have been discussed and agreed upon with patient.)     Short-Term Goals~4 weeks  Goal Met   1. Ramon Mei will ambulate 1000 feet within 6 min time frame to demonstrate improved functional mobility. [] Date:   2. Ramon Mei will demonstrate improved Oswestry score by 10 points to show improved functional mobility. [] Date:   3. Caleb Perla to be independent with initial HEP for core strength and functional ROM   [] Date:   4. Ramon Mei will tolerate 6 min walk on treadmill without use of UE's for support to demonstrate improved endurance and core strength.    [] Date:   5. Ramon Mei will improve MMT to >=4+/5 to all LE strengthening to return to PLOF and improve functional tolerance. [] Date:      [] Date:         Long Term Goals~8 weeks Goal Met   1. Ramon Mei will be able to walk x 10 min treadmill, > 2.0 mph to show improved ability to ambulate community distances and begin progressive walking program for weight loss. [] Date:   2. Ramon Mei will demonstrate improved Oswestry score by 20 points to show increased overall functional mobility. [] Date:   3. Caleb Perla to be independent with advanced HEP for core strengthening and functional ROM. [] Date:   4. Ramon Mei will be able to ambulate 1 flight of stairs safely without report of increased symptoms.     [] Date:    Ramon Mei will demonstrate TUG score of < 9 sec to show improved functional mobility and decreased fall risk  [] Date:    Mildred Barney will sit to stand without UE >/= 14 times in 30 sec  [] Date:     Outcome Measure: Tool Used: Modified Oswestry Low Back Pain Questionnaire  Score:  Initial: 22/50  Most Recent: X/50 (Date: -- )   Interpretation of Score: Each section is scored on a 0-5 scale, 5 representing the greatest disability. The scores of each section are added together for a total score of 50. Medical Necessity:   · Skilled intervention continues to be required due to address above deficits affecting participation in basic ADLs and overall functional tolerance. Reason for Services/Other Comments:  · Patient continues to require skilled intervention due to address above deficits affecting participation in basic ADLs and overall functional tolerance. Total Duration: 40 min evaluation (20 min treatment)    PT Patient Time In/Time Out  Time In: 1400  Time Out: 1500    Rehabilitation Potential For Stated Goals: Via Blanca Perla's therapy, I certify that the treatment plan above will be carried out by a therapist or under their direction. Thank you for this referral,  Kenneth Elder PT       Referring Physician: Sigifredo Chang MD            HISTORY:   History of Present Injury/Illness (Reason for Referral  Pt states 6 years ago onset low back pain and BLE pain insidiously and worsening over time. MRI 2 years ago showing lumbar stenosis as well as spondylolisthesis. Pt underwent right laminectomy at that time with good results eventually, however, pain began. Pt states that location of pain is slightly different as it wraps around waist to groin and to right LE, occasionally left LE. Lumbar laminectomy Aug 10, 2020 left side. Pt states that he has not experienced much relief of symptoms after this most recent surgery.   MD recommends pt to lose weight and referred to physical therapy to improve core strength. Underwent 3 months of PT with some improvement functional mobiltiy. pain continued throughout last 6-8 months without change, referred for 2nd MRI recently showing mild spinal canal stenosis and facet arthopathy. Referred to pain management MD however has not scheduled yet. Also states that pt has not discussed MRI results with neurosurg yet. Went to primary care MD for yearly and discussed back pain, recommended losing 30lbs, and referred to PT. F/u February 2022. Location: low back pain throbbing intermittent, right anterior thigh to anterior, left posterior thigh to anterior lower leg  numbness and cramps calves ; waking with pain every night     Pain Severity:  Current:3/10  Best: 0/10  Worst: 10/10     · Aggravating factors: standing, walking 2-3 min , lifting, stairs  · Relieving factors: sitting, laying down   · Irritability: Medium (Onset of pain is equal to alleviation)      Past Medical History/Comorbidities:   Mr. Caryn Cruz  has a past medical history of Aneurysm ([facility] [address] CAD (coronary artery disease), Chronic pain, Depressive disorder, not elsewhere classified, Essential hypertension, benign, GERD (gastroesophageal reflux disease), Obese, Pure hypercholesterolemia, Sleep apnea, and Thyroid disease. Mr. Caryn Cruz  has a past surgical history that includes pr colonoscopy flx dx w/collj spec when pfrmd (8/17/2018); colonoscopy (N/A, 8/17/2018); pr cardiac surg procedure unlist (2014); pr cardiac surg procedure unlist (2015); pr abdomen surgery proc unlisted (08/2018); hx lumbar laminectomy (08/10/2020); hx knee arthroscopy (Right, YRS AGO); hx back surgery; and hx back surgery.     Active Ambulatory Problems     Diagnosis Date Noted    Essential hypertension, benign     Depressive disorder, not elsewhere classified     Pure hypercholesterolemia     Major depressive disorder with single episode, in partial remission ([address] 05/05/2016    Spondylolisthesis at L3-L4 level 10/09/2018    Lumbar stenosis with neurogenic claudication 10/09/2018    Strain of lumbar region 01/16/2020    Class 1 obesity due to excess calories without serious comorbidity with body mass index (BMI) of 33.0 to 33.9 in adult 01/16/2020    Severe obesity ([facility] [address] 03/31/2020    Coronary artery disease with stable angina pectoris [facility] 08/28/2020    Acute bilateral low back pain without sciatica 08/25/2021     Resolved Ambulatory Problems     Diagnosis Date Noted    No Resolved Ambulatory Problems     Past Medical History:   Diagnosis Date    Aneurysm ([facility] [address]     CAD (coronary artery disease)     Chronic pain     GERD (gastroesophageal reflux disease)     Obese     Sleep apnea     Thyroid disease      Social History/Living Environment:     Social History      Not on file   Tobacco Use    Smoking status: Former Smoker     Packs/day: 0.50     Years: 25.00     Pack years: 12.50    Smokeless tobacco: Never Used    Tobacco comment: quit Oct 2018   Vaping Use    Vaping Use: Never used   Substance and Sexual Activity    Alcohol use: Yes     Comment: only drinks 6 or more about once a year    Drug use: No    Sexual activity: Not on file   Other Topics Concern    Not on file   Social History Narrative    Not on file     Social Determinants of Health     Financial Resource Strain:     Difficulty of Paying Living Expenses:    Food Insecurity:     Worried About Running Out of Food in the Last Year:     [address] in the Last Year:    Transportation Needs:     Lack of Transportation (Medical):  Lack of Transportation (Non-Medical):    Physical Activity:     Days of Exercise per Week:     Minutes of Exercise per Session:    Stress:     Feeling of Stress :       Prior Level of Function/Work/Activity:  Works laying tile and martha part time    Other Clinical Tests:           Prior L3-4 left transpedicular screw and omar fixation. There is unchanged L3 on  L4 anterolisthesis. Modic type degenerative signal changes of the paired  endplates at I2-X8. No acute fracture evident. The intervertebral discs are  diffusely dehydrated with height loss at multiple levels.     L1-2: Broad-based disc bulge and mild facet arthrosis without sniffing and  narrowing of the central spinal canal or neural foramina. L2-3:     Broad-based disc bulge, ligamentum flavum hypertrophy, and facet  arthrosis resulting in mild spinal canal stenosis and moderate bilateral  neuroforaminal narrowing. L3-4:     Disc pseudobulge and facet arthrosis resulting in moderate right and  mild left neuroforaminal narrowing without significant spinal canal stenosis. L4-5:     Broad-based disc bulge, ligamentum flavum hypertrophy, and facet  arthrosis resulting in mild to moderate bilateral neuroforaminal narrowing and  mild spinal canal stenosis. L5-S1:  No significant spinal canal stenosis or neuroforaminal narrowing.     Aneurysm of the infrarenal abdominal aorta measuring 3.0 cm AP.     Previous Treatment Approaches:          Previous Physical Therapy      Current Medications:    Current Outpatient Medications:     DULoxetine (CYMBALTA) 60 mg capsule, Take 1 Capsule by mouth two (2) times a day., Disp: 180 Capsule, Rfl: 1    omeprazole (PRILOSEC OTC) 20 mg tablet, Take 1 Tablet by mouth daily. , Disp: 30 Tablet, Rfl: 6    metoprolol tartrate (LOPRESSOR) 100 mg IR tablet, Take 1 Tablet by mouth two (2) times a day., Disp: 60 Tablet, Rfl: 6    atorvastatin (LIPITOR) 80 mg tablet, Take 1 Tablet by mouth daily. , Disp: 90 Tablet, Rfl: 1    isosorbide mononitrate ER (IMDUR) 120 mg CR tablet, Take 120 mg by mouth two (2) times a day., Disp: , Rfl:     ranolazine ER (RANEXA) 500 mg SR tablet, Take 500 mg by mouth., Disp: , Rfl:     amLODIPine (NORVASC) 10 mg tablet, Take 10 mg by mouth two (2) times a day., Disp: , Rfl:     lisinopril (PRINIVIL, ZESTRIL) 20 mg tablet, Take  by mouth daily. , Disp: , Rfl:     aspirin delayed-release 81 mg tablet, Take  by mouth daily. , Disp: , Rfl:     nitroglycerin (NITROSTAT) 0.4 mg SL tablet, by SubLINGual route every five (5) minutes as needed for Chest Pain., Disp: , Rfl:     prasugrel (EFFIENT) 10 mg tablet, Take 10 mg by mouth daily. Last dose 11/6/18-  Dr Bernarda Peoples had MI/CABG in 2014, Disp: , Rfl:         Ambulatory/Rehab Services H2 Model Falls Risk Assessment    Risk Factors:       No Risk Factors Identified Ability to Rise from Chair:       (0)  Ability to rise in a single movement    Falls Prevention Plan:       Physical Limitations to Exercise (specify):  none   Total: (5 or greater = High Risk): 0    ©2010 [facility] [place]. All Rights Reserved. [facility] Patent #[id number].  Federal Law prohibits the replication, distribution or use without written permission from [facility] of [address] [place] [address]         Date Last Reviewed:  10/27/2021     1-2: MODERATE COMPLEXITY   EXAMINATION:   Observation/Orthostatic Postural Assessment:          Decreased lumbar lordotic curvature        Right knee 15 deg flex contracture    ROM:                     Joint: Eval Date: 10/27/2021   Re-Assess Date:    Active ROM       Lumbar Extension 50%      Lumbar Flexion 100%       RIGHT LEFT RIGHT LEFT   Lumbar Sidebending 50% 50%     Lumbar Rotation 50%  50%            Hip Flexion/extension        Hip Rotation                     Strength:     Eval Date: 10/27/2021   Re-Assess Date:      RIGHT LEFT RIGHT LEFT   Hip  Flexion  4/5  4+/5     Hip Abduction   4/5  4+/5     Knee flexion   4/5  4+/5     Knee Extension   4/5  4+/5     Ankle PF   4/5  4/5     Ankle DF  4/5  4+/5              Neurological Screen: 1+ patella and gastroc      Functional Mobility:     Date:  10/27/21 Date:   Date:     Activity/Exercise Parameters Parameters Parameters   TUG  11 sec     6 min walk test  600 ft  (unable after 3 min)     SLS 2 sec      30 sec sit to stand  10 reps                              Body Structures Involved:  1. Nerves  2. Joints  3. Muscles  4. Ligaments Body Functions Affected:  1. Sensory/Pain  2. Neuromusculoskeletal  3. Movement Related Activities and Participation Affected:  1. Mobility  2. Self Care     Number of elements that affect the Plan of Care: 3: MODERATE COMPLEXITY   CLINICAL PRESENTATION:   Presentation: Stable and uncomplicated: LOW COMPLEXITY   CLINICAL DECISION MAKING:      Use of outcome tool(s) and clinical judgement create a POC that gives a: Questionable prediction of patient's progress: MODERATE COMPLEXITY   See treatment note for associated treatment provided today.       Future Appointments   Date Time Provider Aria Carrillo   11/1/2021 10:30 AM Paola Jay, PT [facility]   11/3/2021 11:15 AM Paola Jay, PT [facility]   11/8/2021 10:00 AM Paola Jay, PT [facility]   11/11/2021 10:30 AM Norberto Henderson, PT [facility]   11/15/2021  9:45 AM Paola Jay, PT [facility]   11/19/2021  9:45 AM Paola Jay, PT [facility]   11/23/2021 10:30 AM Norberto Henderson, PT [facility] [place] [facility]   11/29/2021  9:45 AM Paola Jay, PT [facility]   12/1/2021  9:45 AM Heather Mcgarry, PT [facility]   2/8/2022  9:10 AM Jaxon Rios MD [facility]         Sarabjit Mackenzie, PT

## 2021-10-27 NOTE — PROGRESS NOTES
Caleb Perla  : 1965  Payor: Aries Tafoya / [place] [address] / Product Type: PPO /  [facility] at [facility]. [facility], [address] [place] [address]  [phone]   [phone]                                                          Karen Morales MD      OUTPATIENT PHYSICAL THERAPY: Daily Treatment Note 10/27/2021   Visit Count:  1    Tx Diagnosis:  Generalized Muscle Weakness (M62.81)  Difficulty in walking, Not elsewhere classified (R26.2)  Low Back Pain (M54.5)  Abnormal posture (R29.3)        Pre-treatment Symptoms/Complaints: See Initial Eval Dated 10/27/2021 for more details. Pain: Initial:3/10  Medications Last Reviewed:  10/27/2021     Post Session: 3/10   Updated Objective Findings: See Initial Eval for more details. TREATMENT:   THERAPEUTIC EXERCISE: (0 minutes):  Exercises per grid below to improve mobility, strength and balance. Required minimal visual, verbal and manual cues to promote proper body alignment and promote proper body posture. Progressed resistance and complexity of movement as indicated. [facility] Date:  10/27/2021 Date:   Date:     Activity/Exercise Parameters Parameters Parameters   Education HEP, POC, PT goals, anatomy/pathology                                               THERAPEUTIC ACTIVITY: ( 20 minutes): Activities per gid below to improve functional movement related mobility, strength and balance to improve neuro-muscular carryover to daily functional activities for improving patient's quality of life. Required visual, verbal and manual cues to promote proper body alignment and promote proper body posture/mechanics. Progressed resistance and complexity of movement as indicated.      Date:  10/27/2021 Date:   Date:     Activity/Exercise Parameters Parameters Parameters   Sit to stand  30 sec x 2        Walking endurance and speed 6 min walk test                                                             MANUAL THERAPY: (0 minutes): Joint mobilization, Soft tissue mobilization was utilized and necessary because of the patient's restricted joint motion and restricted motion of soft tissue mobility. Date  10/27/2021    Technique Used Grade  Level # Time(s) Desired effect/clinical reasoning                                                      *included in HEP      Treatment/Session Summary:    Response to Treatment: Pt demonstrated understanding of POC and initial HEP. No increase in pain or adverse reactions. Communication/Consultation:  POC, HEP, PT goals, Faxed initial evaluation to MD.   Equipment provided today: HEP Handout   Recommendations/Intent for next treatment session:   Next visit will focus on Core Stability Pain Science Education Quad strengthening Hip strengthening. Treatment Plan of Care Effective Dates: 10/27/2021 TO 1/25/2022 (90 days).   Frequency/Duration: 2 times a week for 90 Days           Total Treatment Billable Duration:   20 min Rx plus Eval   PT Patient Time In/Time Out  Time In: 1400  Time Out: [address], PT    Future Appointments   Date Time Provider Aria Sunshinei   11/1/2021 10:30 AM Faye Serrano, PT [facility]   11/3/2021 11:15 AM Faye Serrano, PT [facility]   11/8/2021 10:00 AM Faye Serrano, PT [facility]   11/11/2021 10:30 AM Asif Gomes, PT [facility]   11/15/2021  9:45 AM Faye Serrano, PT [facility]   11/19/2021  9:45 AM Faye Serrano, PT [facility]   11/23/2021 10:30 AM Asif Gomes, PT [facility]   11/29/2021  9:45 AM Faye Serrano, PT [facility]   12/1/2021  9:45 AM Faye Serrano, PT [facility]   2/8/2022  9:10 AM Ofe Rios MD [facility]

## 2021-11-01 ENCOUNTER — HOSPITAL ENCOUNTER (OUTPATIENT)
Dept: PHYSICAL THERAPY | Age: 56
Discharge: HOME | End: 2021-11-01
Payer: COMMERCIAL

## 2021-11-01 PROCEDURE — 97110 THERAPEUTIC EXERCISES: CPT

## 2021-11-01 NOTE — PROGRESS NOTES
Johney Dubin Trombini  : 1965  Payor: [place] / [place] [address] [place] [address] Rd / Product Type: PPO /  [id number] at [address] [place] [address]. [address] [facility] [address] [place] [address], [place], [address] [place] [address]  [phone]   [phone]                                                          Jay Givens MD      OUTPATIENT PHYSICAL THERAPY: Daily Treatment Note 2021   Visit Count:  2    Tx Diagnosis:  Generalized Muscle Weakness (M62.81)  Difficulty in walking, Not elsewhere classified (R26.2)  Low Back Pain (M54.5)  Abnormal posture (R29.3)        Pre-treatment Symptoms/Complaints: Pt states that he is doing ok and trying to drink more water as discussed last session. Pain: Initial:3/10  Medications Last Reviewed:  2021     Post Session: 3/10   Updated Objective Findings: HR 70; 02 100%, BP: 153/81 after 10 min of nustep and c/o's of chest and Left arm symptoms  3 min later (no symptoms present)  143/80         TREATMENT:   THERAPEUTIC EXERCISE: (40 minutes):  Exercises per grid below to improve mobility, strength and balance. Required minimal visual, verbal and manual cues to promote proper body alignment and promote proper body posture.   Progressed resistance and complexity of movement as indicated.        21        Activity/Exercise               Education         Quad set          Calf stretching          Stairs          nustep  Level 6 x 10 min         Hip flexor stretch          iso multif         iso hip flex         Prone hip ext with knee at 90         Prone ham curls          clams         TKE         piriformis stretch         Marching          Side stepping          Lunge steps         SKTC          LTR         Bridges          Walking * 3:30 min limited by left back and hip pain         Rolling*  upper and lower intiation 2 x 8 to side lying only          Sit to stand * 15# KB 2 x 10         Calf raises          Seated lumbar flexion           THERAPEUTIC ACTIVITY: ( minutes): Activities per gid below to improve functional movement related mobility, strength and balance to improve neuro-muscular carryover to daily functional activities for improving patient's quality of life. Required visual, verbal and manual cues to promote proper body alignment and promote proper body posture/mechanics. Progressed resistance and complexity of movement as indicated. Date:  10/27/2021 Date:   Date:     Activity/Exercise Parameters Parameters Parameters   Sit to stand  30 sec x 2        Walking endurance and speed 6 min walk test                                                              MANUAL THERAPY: (0 minutes): Joint mobilization, Soft tissue mobilization was utilized and necessary because of the patient's restricted joint motion and restricted motion of soft tissue mobility. Date  11/1/2021    Technique Used Grade  Level # Time(s) Desired effect/clinical reasoning                                                      *included in HEP      Treatment/Session Summary:      Response to Treatment: *Pt with c/o's of left arm numbness and chest pain/tight after 10 min nustep today. Vitals taken as above see objective. Mild elevated BP, HR and 02 normal.  Symptoms lasted <1 min and pt reports this happens frequently and has been told by cardiologist that these episodes are benign. Pt monitored for symptoms and signs throughout session today, no abnormalities noted thereafter. Pt tolerated above there ex with 5/10 pain of low back and hip, limiting walking only. Pt required cuing for most activities regarding technique, breathing, and posture. Encouraged pt to push walking tolerance- 3-4 min x 2-3 times per day. Will continue to progress strength, mobility, and endurance to improve quality of life.        Communication/Consultation:     Equipment provided today: HEP Handout   Recommendations/Intent for next treatment session:   Next visit will focus on Core Stability Pain Science Education Quad strengthening Hip strengthening. Treatment Plan of Care Effective Dates: 10/27/2021 TO 1/25/2022 (90 days).   Frequency/Duration: 2 times a week for 90 Days       Total Treatment Billable Duration: 40 min    PT Patient Time In/Time Out  Time In: 1030  Time Out: [address], PT    Future Appointments   Date Time Provider Aria Carrillo   11/3/2021 11:15 AM Paola Misty, PT [facility]   11/8/2021 10:00 AM Paola Misty, PT [facility]   11/11/2021 10:30 AM Norberto Henderson, PT [facility]   11/15/2021  9:45 AM Paola Jay, PT [facility]   11/19/2021  9:45 AM Paola Jay, PT [facility]   11/23/2021 10:30 AM Norberto Henderson, PT [facility] [place] [facility]   11/29/2021  9:45 AM Paola Kaih, PT [facility]   12/1/2021  9:45 AM Paola Kaih, PT [facility]   2/8/2022  9:10 AM Jaxon Rios MD [facility]

## 2021-11-03 ENCOUNTER — HOSPITAL ENCOUNTER (OUTPATIENT)
Dept: PHYSICAL THERAPY | Age: 56
Discharge: HOME | End: 2021-11-03
Payer: COMMERCIAL

## 2021-11-03 PROCEDURE — 97140 MANUAL THERAPY 1/> REGIONS: CPT

## 2021-11-03 PROCEDURE — 97110 THERAPEUTIC EXERCISES: CPT

## 2021-11-03 NOTE — PROGRESS NOTES
Daniel Perla  : 1965  Payor: Mason Mention / [place]: [place] [address] [facility] [facility] [address] / Product Type: PPO /  2809 [facility] at [address] [place] [address]. Caleb GalloMichael, [address] [place] [address], [address] [place] [address]  [phone]   [phone]                                                          Sigifredo Chang MD      OUTPATIENT PHYSICAL THERAPY: Daily Treatment Note 11/3/2021   Visit Count:  3    Tx Diagnosis:  Generalized Muscle Weakness (M62.81)  Difficulty in walking, Not elsewhere classified (R26.2)  Low Back Pain (M54.5)  Abnormal posture (R29.3)        Pre-treatment Symptoms/Complaints: Pt states that he is walking 3 min at a time at home several times per day. Reports a couple of days ago he strained his left side of back trying to help lift trailer hitch and has been having sharp and tingling sensation of left low back since . Pain: Initial:8/10  Medications Last Reviewed:  11/3/2021     Post Session: 3/10   Updated Objective Findings:          TREATMENT:   THERAPEUTIC EXERCISE: (30 minutes):  Exercises per grid below to improve mobility, strength and balance. Required minimal visual, verbal and manual cues to promote proper body alignment and promote proper body posture.   Progressed resistance and complexity of movement as indicated.        11/1/21 11/3/21       Activity/Exercise               Education         Marching   With stick for balance 2 x 5 B        Calf stretching          Stairs          nustep  Level 6 x 10 min  ---occupied--       Hip flexor stretch          iso multif         iso hip flex         Prone hip ext with knee at 90         Prone ham curls   B x 10        clams         piriformis stretch         Side stepping          Prone hip IR/ER   X 10 B with manual resist        LTR         Bridges          Walking * 3:30 min limited by left back and hip pain         Rolling*  upper and lower intiation 2 x 8 to side lying only          Sit to stand * 15# KB 2 x 10 Wand/stick over head 2 x 10 reg height seat        Calf raises          Seated lumbar flexion   Red ball x 4  min                  THERAPEUTIC ACTIVITY: ( minutes): Activities per gid below to improve functional movement related mobility, strength and balance to improve neuro-muscular carryover to daily functional activities for improving patient's quality of life. Required visual, verbal and manual cues to promote proper body alignment and promote proper body posture/mechanics. Progressed resistance and complexity of movement as indicated. Date:  10/27/2021 Date:   Date:     Activity/Exercise Parameters Parameters Parameters   Sit to stand  30 sec x 2        Walking endurance and speed 6 min walk test                                                              MANUAL THERAPY: (13 minutes): Joint mobilization, Soft tissue mobilization was utilized and necessary because of the patient's restricted joint motion and restricted motion of soft tissue mobility. Date  11/3/2021    Technique Used Grade  Level # Time(s) Desired effect/clinical reasoning   Joint mob: PA IV-- Central L-S junction  2 bouts Increase L-S joint mobility into extension to improve tolerance of standing/walking     MFR Skin rolling and gliding Left lumbar  5 min  To reduce tissue restriction    DTM/STM  Left QL and lumbar paraspinal 5 min  To reduce triggerpoint/spasm and improve tissue mobility                                  *included in HEP      Treatment/Session Summary:      Response to Treatment: Cuing needed for posture and breathing during exercises. Performed manual work to address triggerpoint and tissue restriction from recent strain left side lumbar . Pt reports symptoms of recent strain greatly reduced after manual, allowing him to increase mobility during functional movements. Palpable trigger point no longer present.     Encouraged pt to add standing march and increase walking time by 30 sec and perform several times per day. Will continue progress core strength and endurance next session. Communication/Consultation:     Equipment provided today: HEP Handout   Recommendations/Intent for next treatment session:   Next visit will focus on Core Stability Pain Science Education Quad strengthening Hip strengthening. Treatment Plan of Care Effective Dates: 10/27/2021 TO 1/25/2022 (90 days).   Frequency/Duration: 2 times a week for 90 Days       Total Treatment Billable Duration: 43 min    PT Patient Time In/Time Out  Time In: 1115  Time Out: 905 [facility], PT    Future Appointments   Date Time Provider Aria Carrillo   11/8/2021 10:00 AM Cristy Cobmayra, PT [facility]   11/11/2021 10:30 AM Jeremy Marie, PT [facility]   11/15/2021  9:45 AM Cristy Cobble, PT [facility]   11/19/2021  9:45 AM Cristy Cobble, PT [facility]   11/23/2021 10:30 AM Jeremy Marie, PT [facility]   11/29/2021  9:45 AM Cristy Cobble, PT [facility]   12/1/2021  9:45 AM Cristy Cobble, PT [facility]   2/8/2022  9:10 AM Lacie Rios MD [facility]

## 2021-11-08 ENCOUNTER — HOSPITAL ENCOUNTER (OUTPATIENT)
Dept: PHYSICAL THERAPY | Age: 56
Discharge: HOME | End: 2021-11-08
Payer: COMMERCIAL

## 2021-11-08 PROCEDURE — 97110 THERAPEUTIC EXERCISES: CPT

## 2021-11-08 PROCEDURE — 97530 THERAPEUTIC ACTIVITIES: CPT

## 2021-11-08 NOTE — PROGRESS NOTES
Micky Perla  : 1965  Payor: Michelle Hurd / [place] [address] [place] [address] Rd / Product Type: PPO /  2809 [facility] at [address] [place] [address]. [address] [facility] [address] [place] [address], [facility], [address] [place] [address]  [phone]   [phone]                                                          Macario Wallace MD      OUTPATIENT PHYSICAL THERAPY: Daily Treatment Note 2021   Visit Count:  4    Tx Diagnosis:  Generalized Muscle Weakness (M62.81)  Difficulty in walking, Not elsewhere classified (R26.2)  Low Back Pain (M54.5)  Abnormal posture (R29.3)        Pre-treatment Symptoms/Complaints:  Pt states that he felt pretty good for 2 days after last session, manual techniques helped reduce pain. continues to state he is progressing walking routine and drinking more water to improve overall health. Pain: Initial:3/10  Medications Last Reviewed:  2021     Post Session: 3/10   Updated Objective Findings:          TREATMENT:   THERAPEUTIC EXERCISE: (40 minutes):  Exercises per grid below to improve mobility, strength and balance. Required minimal visual, verbal and manual cues to promote proper body alignment and promote proper body posture.   Progressed resistance and complexity of movement as indicated.        11/1/21 11/3/21 11/8/21      Activity/Exercise               Education   HEP       Marching   With stick for balance 2 x 5 B        Calf stretching          Stairs          nustep  Level 6 x 10 min  ---occupied-- Level 4 x 10 min hill      Hip flexor stretch          iso multif         iso hip flex (for TrA)          Prone hip ext with knee at 90         Prone ham curls   B x 10        clams         piriformis stretch         Side stepping    YTB 2 x 10       Prone hip IR/ER   X 10 B with manual resist        LTR         Bridges          Walking * 3:30 min limited by left back and hip pain   3 min 704 ft pain 8/10 to right LE       Rolling*  upper and lower intiation 2 x 8 to side lying only          Sit to stand * 15# KB 2 x 10  Wand/stick over head 2 x 10 reg height seat  2# Wand overhead press 2 x 10, 18 inch height seat      Calf raises          Seated lumbar flexion   Red ball x 4  min                  THERAPEUTIC ACTIVITY: ( minutes): Activities per gid below to improve functional movement related mobility, strength and balance to improve neuro-muscular carryover to daily functional activities for improving patient's quality of life. Required visual, verbal and manual cues to promote proper body alignment and promote proper body posture/mechanics. Progressed resistance and complexity of movement as indicated. Date:  10/27/2021 Date:   Date:     Activity/Exercise Parameters Parameters Parameters   Sit to stand  30 sec x 2        Walking endurance and speed 6 min walk test                                                             MANUAL THERAPY: (0 minutes): Joint mobilization, Soft tissue mobilization was utilized and necessary because of the patient's restricted joint motion and restricted motion of soft tissue mobility. Date  11/3/2021    Technique Used Grade  Level # Time(s) Desired effect/clinical reasoning   Joint mob: PA IV-- Central L-S junction  2 bouts Increase L-S joint mobility into extension to improve tolerance of standing/walking     MFR Skin rolling and gliding Left lumbar  5 min  To reduce tissue restriction    DTM/STM  Left QL and lumbar paraspinal 5 min  To reduce triggerpoint/spasm and improve tissue mobility                                  *included in HEP      Treatment/Session Summary:      Response to Treatment:   Cuing needed often to improve posture and body mechanics druing activities today especially if increased pain reported. Pt self limiting due to anticipation of pain and percieved weakness of right LE--tends to decreased weight brearing through right LE consistently at 3 min ambulation.  States he had an instance in past when pain began of LE and leg \"gave out\" but that this has not happened recently stating he usually leans on something or sits until pain resolves. Communication/Consultation:  Discussed progressing walking tolerance by increasing 30 sec at a time at home-- advised to push self but maintain safety. Equipment provided today:    Recommendations/Intent for next treatment session:   Next visit will focus on Core Stability/strengthening, Pain Science Education Quad and Hip strengthening, functional activity tolerance         Treatment Plan of Care Effective Dates: 10/27/2021 TO 1/25/2022 (90 days).   Frequency/Duration: 2 times a week for 90 Days       Total Treatment Billable Duration: 40  Min (5 min unbilled for rest)    PT Patient Time In/Time Out  Time In: 1005  Time Out: [address], PT    Future Appointments   Date Time Provider Aria Carrillo   11/11/2021 10:30 AM Eleuterio Stovall, PT [facility]   11/15/2021  9:45 AM [facility] Cain, [facility]   11/19/2021  9:45 AM Lela Cain, PT [facility]   11/23/2021 10:30 AM [facility]all[facility]   11/29/2021  9:45 AM Lela Cain, PT [facility]   12/1/2021  9:45 AM Lela Cain, PT [facility]   2/8/2022  9:10 AM Jackie Rios MD [facility]

## 2021-11-11 ENCOUNTER — APPOINTMENT (OUTPATIENT)
Dept: PHYSICAL THERAPY | Age: 56
End: 2021-11-11
Payer: COMMERCIAL

## 2021-11-15 ENCOUNTER — HOSPITAL ENCOUNTER (OUTPATIENT)
Dept: PHYSICAL THERAPY | Age: 56
Discharge: HOME | End: 2021-11-15
Payer: COMMERCIAL

## 2021-11-15 PROCEDURE — 97110 THERAPEUTIC EXERCISES: CPT

## 2021-11-15 PROCEDURE — 97140 MANUAL THERAPY 1/> REGIONS: CPT

## 2021-11-15 NOTE — PROGRESS NOTES
Jazmyne Perla  : 1965  Payor: Noni  / [place] [address] [place] [address] Rd / Product Type: PPO /  Destiny Ema at [address] [facility], [address] [place] [address], [address] [place] [address]  [phone]   [phone]                                                          Kelsey Hurtado MD      OUTPATIENT PHYSICAL THERAPY: Daily Treatment Note 11/15/2021   Visit Count:  5    Tx Diagnosis:  Generalized Muscle Weakness (M62.81)  Difficulty in walking, Not elsewhere classified (R26.2)  Low Back Pain (M54.5)  Abnormal posture (R29.3)        Pre-treatment Symptoms/Complaints:  pt states had to cancel last visit due to death in family. Walked at work a lot last Thursday during work and friday, spasms began pt states low back spasms that some times radiate to inner thigh and inside lower leg. Pain: Initial:5/10 low back left side   Medications Last Reviewed:  11/15/2021     Post Session: 5/10 localized low back   Updated Objective Findings:          TREATMENT:   THERAPEUTIC EXERCISE: (35 minutes):  Exercises per grid below to improve mobility, strength and balance. Required minimal visual, verbal and manual cues to promote proper body alignment and promote proper body posture.   Progressed resistance and complexity of movement as indicated.        11/1/21 11/3/21 11/8/21 11/15/21   Activity/Exercise           Education   HEP     Marching   With stick for balance 2 x 5 B      Calf stretching        Stairs        nustep  Level 6 x 10 min  ---occupied-- Level 4 x 10 min hill Level 6 x 10 min    Hip flexor stretch        iso multif    X 3 min    iso hip flex (for TrA)        Prone hip ext with knee at 90       Prone ham curls   B x 10   DL x 10    clams    X 10 B   piriformis stretch       Side stepping    YTB 2 x 10     Prone hip IR/ER   X 10 B with manual resist   manually    LTR    Wand over head x 5 B   Bridges     x 10 , 10 sec hold    Walking * 3:30 min limited by left back and hip pain   3 min 704 ft pain 8/10 to right LE     Rolling*  upper and lower intiation 2 x 8 to side lying only        Sit to stand * 15# KB 2 x 10  Wand/stick over head 2 x 10 reg height seat  2# Wand overhead press 2 x 10, 18 inch height seat    Calf raises        Seated lumbar flexion   Red ball x 4  min                THERAPEUTIC ACTIVITY: ( minutes): Activities per gid below to improve functional movement related mobility, strength and balance to improve neuro-muscular carryover to daily functional activities for improving patient's quality of life. Required visual, verbal and manual cues to promote proper body alignment and promote proper body posture/mechanics. Progressed resistance and complexity of movement as indicated. Date:  10/27/2021 Date:   Date:     Activity/Exercise Parameters Parameters Parameters   Sit to stand  30 sec x 2        Walking endurance and speed 6 min walk test                                                             MANUAL THERAPY: (10 minutes): Joint mobilization, Soft tissue mobilization was utilized and necessary because of the patient's restricted joint motion and restricted motion of soft tissue mobility. Date  11/3/2021    Technique Used Grade  Level # Time(s) Desired effect/clinical reasoning   Joint mob: PA IV-- Central L-S junction  2 bouts Increase L-S joint mobility into extension to improve tolerance of standing/walking     MFR Skin rolling and gliding bilateral lumbar- sacra region  5 min  To reduce tissue restriction    DTM/STM  Left QL, glute, and lumbar paraspinal 5 min  To reduce triggerpoint/spasm and improve tissue mobility                                  *included in HEP      Treatment/Session Summary:      Response to Treatment: Focused on NWbing strengthening and stretching -promoting improved intervertebral alignment and joint mechanics. No increased pain.   Encouraged pt to add multif, clams, and bridges to HEP consistently for increased core strengthening without increased symptoms. Communication/Consultation:  Discussed progressing walking tolerance by increasing 30 sec at a time at home-- advised to push self but maintain safety. Equipment provided today:    Recommendations/Intent for next treatment session:   Next visit will focus on Core Stability/strengthening, Pain Science Education Quad and Hip strengthening, functional activity tolerance         Treatment Plan of Care Effective Dates: 10/27/2021 TO 1/25/2022 (90 days).   Frequency/Duration: 2 times a week for 90 Days       Total Treatment Billable Duration: 39  Min   PT Patient Time In/Time Out  Time In: 2705  Time Out: 860 [facility], PT    Future Appointments   Date Time Provider Aria Carrillo   11/19/2021  9:45 AM [facility]ael, [facility]   11/23/2021 10:30 AM Elizabeth Galvin, PT [facility]   11/29/2021  9:45 AM Deitra [facility]etahminam, [facility]   12/1/2021  9:45 AM Deitra Beetahminam, PT [facility]   2/8/2022  9:10 AM Rodo Rios MD [facility]

## 2021-11-19 ENCOUNTER — HOSPITAL ENCOUNTER (OUTPATIENT)
Dept: PHYSICAL THERAPY | Age: 56
Discharge: HOME | End: 2021-11-19
Payer: COMMERCIAL

## 2021-11-19 NOTE — PROGRESS NOTES
Jazmyne Perla  : 1965  Primary: [facility]  Secondary:  [facility] [place] [address], [address] [facility]  [phone]   [phone]      OUTPATIENT DAILY NOTE    NAME/AGE/GENDER: Nanette Palm is a 60 y.o. male. DATE: 2021    Mr. Chaselaura Luis for today's appointment due to work conflict.     My Morales, PT

## 2021-12-01 ENCOUNTER — HOSPITAL ENCOUNTER (OUTPATIENT)
Dept: PHYSICAL THERAPY | Age: 56
Discharge: HOME | End: 2021-12-01
Payer: COMMERCIAL

## 2021-12-01 PROCEDURE — 97110 THERAPEUTIC EXERCISES: CPT

## 2021-12-01 NOTE — PROGRESS NOTES
Teressa Perla  : 1965  Payor: Kasey Mayorga / [place] [address] [place] [address] / Product Type: PPO /  2809 [facility] at [address] [place] [address]. [address] [place] [address], [facility], [address] [place] [address]  [phone]   [phone]                                                          Seema Franks MD      OUTPATIENT PHYSICAL THERAPY: Daily Treatment Note 2021   Visit Count:  6    Tx Diagnosis:  Generalized Muscle Weakness (M62.81)  Difficulty in walking, Not elsewhere classified (R26.2)  Low Back Pain (M54.5)  Abnormal posture (R29.3)        Pre-treatment Symptoms/Complaints:  Pt states that he has not been in therapy due to being out of town. Was not consistent with HEP over the last week while out of town    Pain: Initial:5/10 low back left side   Medications Last Reviewed:  2021     Post Session: 5/10 B LE mostly    Updated Objective Findings:          TREATMENT:   THERAPEUTIC EXERCISE: (45 minutes):  Exercises per grid below to improve mobility, strength and balance. Required minimal visual, verbal and manual cues to promote proper body alignment and promote proper body posture.   Progressed resistance and complexity of movement as indicated.        11/1/21 11/3/21 11/8/21 11/15/21 12/1/21   Activity/Exercise            Education   HEP      Marching   With stick for balance 2 x 5 B       Calf stretching         Stairs         nustep  Level 6 x 10 min  ---occupied-- Level 4 x 10 min hill Level 6 x 10 min  Level 4 x 10 min    Hip flexor stretch         iso multif    X 3 min     iso hip flex (for TrA)      4 x 10 sec double LE hold at 90/90 with TrA iso   Prone hip ext with knee at 90        Prone ham curls   B x 10   DL x 10     clams    X 10 B X 10 B    piriformis stretch        Side stepping    YTB 2 x 10      Prone hip IR/ER   X 10 B with manual resist   manually     LTR    Wand over head x 5 B X 5    Bridges     x 10 , 10 sec hold  2 X 10, 5 sec hold   Walking * 3:30 min limited by left back and hip pain   3 min 704 ft pain 8/10 to right LE      Rolling*  upper and lower intiation 2 x 8 to side lying only         Sit to stand * 15# KB 2 x 10  Wand/stick over head 2 x 10 reg height seat  2# Wand overhead press 2 x 10, 18 inch height seat  15# KB 2 x 60 sec    Calf raises         Seated lumbar flexion   Red ball x 4  min       Baker carry      10# DB x 3 hallway laps                                 THERAPEUTIC ACTIVITY: ( 0minutes): Activities per gid below to improve functional movement related mobility, strength and balance to improve neuro-muscular carryover to daily functional activities for improving patient's quality of life. Required visual, verbal and manual cues to promote proper body alignment and promote proper body posture/mechanics. Progressed resistance and complexity of movement as indicated. Date:  10/27/2021 Date:   Date:     Activity/Exercise Parameters Parameters Parameters   Sit to stand  30 sec x 2        Walking endurance and speed 6 min walk test                                                             MANUAL THERAPY: (10 minutes): Joint mobilization, Soft tissue mobilization was utilized and necessary because of the patient's restricted joint motion and restricted motion of soft tissue mobility.         Date  11/3/2021    Technique Used Grade  Level # Time(s) Desired effect/clinical reasoning   Joint mob: PA IV-- Central L-S junction  2 bouts Increase L-S joint mobility into extension to improve tolerance of standing/walking     MFR Skin rolling and gliding bilateral lumbar- sacra region  5 min  To reduce tissue restriction    DTM/STM  Left QL, glute, and lumbar paraspinal 5 min  To reduce triggerpoint/spasm and improve tissue mobility                                  *included in HEP      Treatment/Session Summary:      Response to Treatment: Pt tolerated all above with increased pain however no compensatory movements with encouragement. Focused on building strength and endurance. Communication/Consultation:  Discussed progressing walking tolerance by increasing 30 sec at a time at home-- advised to push self but maintain safety. Equipment provided today:    Recommendations/Intent for next treatment session:   Next visit will focus on Core Stability/strengthening, Pain Science Education Quad and Hip strengthening, functional activity tolerance         Treatment Plan of Care Effective Dates: 10/27/2021 TO 1/25/2022 (90 days).   Frequency/Duration: 2 times a week for 90 Days       Total Treatment Billable Duration: 39  Min   PT Patient Time In/Time Out  Time In: 0945  Time Out: [facility], PT    Future Appointments   Date Time Provider Aria Sunshinei   12/8/2021  9:45 AM [facility]orm[facility] Casdonta, PT [facility]   12/10/2021  9:45 AM [facility] Casino, [facility]   12/15/2021  9:45 AM Dorma Casino, PT [facility]   12/17/2021  9:45 AM Dorma Casino, PT [facility]   12/29/2021  9:45 AM Dorma Casino, PT [facility]   2/8/2022  9:10 AM Javier Rios MD [facility]

## 2021-12-08 ENCOUNTER — HOSPITAL ENCOUNTER (OUTPATIENT)
Dept: PHYSICAL THERAPY | Age: 56
Discharge: HOME | End: 2021-12-08
Payer: COMMERCIAL

## 2021-12-08 PROCEDURE — 97530 THERAPEUTIC ACTIVITIES: CPT

## 2021-12-08 PROCEDURE — 97110 THERAPEUTIC EXERCISES: CPT

## 2021-12-08 NOTE — PROGRESS NOTES
Jorge Narayan Salcedombjazmín  : 1965  Payor: [employer] / [place]: [place] OF [address] [place] [address] Rd / Product Type: PPO /  [id number] at [address] [place] [address]. [address] [facility] [address] [place] [address], [facility], [address] [place] [address] Road  [phone]   [phone]                                                          Libertad Elder MD      OUTPATIENT PHYSICAL THERAPY: Daily Treatment Note 2021   Visit Count:  7    Tx Diagnosis:  Generalized Muscle Weakness (M62.81)  Difficulty in walking, Not elsewhere classified (R26.2)  Low Back Pain (M54.5)  Abnormal posture (R29.3)        Pre-treatment Symptoms/Complaints:  Pt states he is able to walk at home x 5 min without stopping but his pain is constantly 8/10 these days. Pain: Initial:8/10   Medications Last Reviewed:  2021     Post Session: 8/10     Updated Objective Findings:          TREATMENT:   THERAPEUTIC EXERCISE: (10 minutes):  Exercises per grid below to improve mobility, strength and balance. Required minimal visual, verbal and manual cues to promote proper body alignment and promote proper body posture.   Progressed resistance and complexity of movement as indicated.        11/1/21 11/3/21 11/8/21 11/15/21 12/1/21 12/8/21   Activity/Exercise             Education   HEP       Marching   With stick for balance 2 x 5 B        Calf stretching          Stairs          nustep  Level 6 x 10 min  ---occupied-- Level 4 x 10 min hill Level 6 x 10 min  Level 4 x 10 min  Level 4 x 10 min    Hip flexor stretch          iso multif    X 3 min      iso hip flex (for TrA)      4 x 10 sec double LE hold at 90/90 with TrA iso    Prone hip ext with knee at 90         Prone ham curls   B x 10   DL x 10      clams    X 10 B X 10 B     piriformis stretch         Side stepping    YTB 2 x 10       Prone hip IR/ER   X 10 B with manual resist   manually      LTR    Wand over head x 5 B X 5     Bridges     x 10 , 10 sec hold  2 X 10, 5 sec hold    Walking * 3:30 min limited by left back and hip pain   3 min 704 ft pain 8/10 to right LE       Rolling*  upper and lower intiation 2 x 8 to side lying only          Sit to stand * 15# KB 2 x 10  Wand/stick over head 2 x 10 reg height seat  2# Wand overhead press 2 x 10, 18 inch height seat  15# KB 2 x 60 sec     Calf raises          Seated lumbar flexion   Red ball x 4  min        Baker carry      10# DB x 3 hallway laps                                    THERAPEUTIC ACTIVITY: ( 35minutes): Activities per gid below to improve functional movement related mobility, strength and balance to improve neuro-muscular carryover to daily functional activities for improving patient's quality of life. Required visual, verbal and manual cues to promote proper body alignment and promote proper body posture/mechanics. Progressed resistance and complexity of movement as indicated. Date:  10/27/2021 Date:  12/8/21 Date:     Activity/Exercise Parameters Parameters Parameters   Sit to stand  30 sec x 2        Walking endurance and speed 6 min walk test  [employer]    5 reps of: Sit to stand 7# DBs and farmer carry x 80 ft     2 reps of: Side stepping 40ft +10 heel raise    2 reps: step tap on stair 2 min + 10 sit to stand weighted overhead push (blue ball)                                                      MANUAL THERAPY: (0 minutes): Joint mobilization, Soft tissue mobilization was utilized and necessary because of the patient's restricted joint motion and restricted motion of soft tissue mobility.         Date  11/3/2021    Technique Used Grade  Level # Time(s) Desired effect/clinical reasoning   Joint mob: PA IV-- Central L-S junction  2 bouts Increase L-S joint mobility into extension to improve tolerance of standing/walking     MFR Skin rolling and gliding bilateral lumbar- sacra region  5 min  To reduce tissue restriction    DTM/STM  Left QL, glute, and lumbar paraspinal 5 min  To reduce triggerpoint/spasm and improve tissue mobility                                  *included in HEP      Treatment/Session Summary:      Response to Treatment: Pt tolerated all above without change in pain. Focused on small circuits of 2 activities at time without rest. Will continue to increase weights, reps, and # of activities next few sessions to improve overall functional mobility and endurance. Communication/Consultation:  Discussed progressing walking tolerance by increasing 30 sec at a time at home-- advised to push self but maintain safety. Equipment provided today:    Recommendations/Intent for next treatment session:   Next visit will focus on Core Stability/strengthening, Pain Science Education Quad and Hip strengthening, functional activity tolerance         Treatment Plan of Care Effective Dates: 10/27/2021 TO 1/25/2022 (90 days).   Frequency/Duration: 2 times a week for 90 Days       Total Treatment Billable Duration: 39  Min   PT Patient Time In/Time Out  Time In: 9555  Time Out: 72 Shikha Junior, PT    Future Appointments   Date Time Provider Aria Carrillo   12/10/2021  9:45 AM Shyam Greenberg, PT [facility] AND [facility]   12/15/2021  9:45 AM Shyam Greenberg, PT [facility]   12/17/2021  9:45 AM [facility]   12/29/2021  9:45 AM Shyam Greenberg, PT [facility]   2/8/2022  9:10 AM Kaylin Rios MD [facility]

## 2021-12-10 ENCOUNTER — HOSPITAL ENCOUNTER (OUTPATIENT)
Dept: PHYSICAL THERAPY | Age: 56
Discharge: HOME | End: 2021-12-10
Payer: COMMERCIAL

## 2021-12-10 NOTE — PROGRESS NOTES
Myrna Perla  : 1965  [place]: [place] [facility] [place]  Secondary:  [facility]  [address] [place] [address], [address] [facility]  [phone]   [phone]        OUTPATIENT DAILY NOTE    NAME/AGE/GENDER: Asif Romo is a 60 y.o. male. DATE: 12/10/2021    Mr. Lin Lew for today's appointment due to \"unable to make it today\" .     Babak Conklin, PT

## 2021-12-15 ENCOUNTER — HOSPITAL ENCOUNTER (OUTPATIENT)
Dept: PHYSICAL THERAPY | Age: 56
Discharge: HOME | End: 2021-12-15
Payer: COMMERCIAL

## 2021-12-15 PROCEDURE — 97140 MANUAL THERAPY 1/> REGIONS: CPT

## 2021-12-15 PROCEDURE — 97110 THERAPEUTIC EXERCISES: CPT

## 2021-12-15 NOTE — PROGRESS NOTES
[facility]  [facility] 1965  Payor: [employer]iana [employer] / [place]: [place] OF [address] [place] [address] Rd / Product Type: PPO /  [id number] [facility], [address] [place] [address], [facility], [address] [place] [address]  [phone]   [phone]                                                          Trinidad Cisse MD      OUTPATIENT PHYSICAL THERAPY: Daily Treatment Note 12/15/2021   Visit Count:  8    Tx Diagnosis:  Generalized Muscle Weakness (M62.81)  Difficulty in walking, Not elsewhere classified (R26.2)  Low Back Pain (M54.5)  Abnormal posture (R29.3)        Pre-treatment Symptoms/Complaints:  Pt states he walked for 5 1/2 min the other day angina and LE pain. States that has had knot in left side back since. Pain: Initial:8/10   Medications Last Reviewed:  12/15/2021     Post Session: 8/10     Updated Objective Findings:          TREATMENT:   THERAPEUTIC EXERCISE: (20 minutes):  Exercises per grid below to improve mobility, strength and balance. Required minimal visual, verbal and manual cues to promote proper body alignment and promote proper body posture.   Progressed resistance and complexity of movement as indicated.        11/1/21 11/3/21 11/8/21 11/15/21 12/1/21 12/8/21 12/15/21   Activity/Exercise              Education   HEP        Marching   With stick for balance 2 x 5 B         Calf stretching           Stairs           nustep  Level 6 x 10 min  ---occupied-- Level 4 x 10 min hill Level 6 x 10 min  Level 4 x 10 min  Level 4 x 10 min  Level 5 x 10 min    Hip flexor stretch           iso multif    X 3 min       iso hip flex (for TrA)      4 x 10 sec double LE hold at 90/90 with TrA iso     Prone hip ext with knee at 90          Prone ham curls   B x 10   DL x 10    X 10 B    clams    X 10 B X 10 B      piriformis stretch          Side stepping    YTB 2 x 10        Prone hip IR/ER   X 10 B with manual resist   manually       LTR    Wand over head x 5 B X 5      Bridges     x 10 , 10 sec hold  2 X 10, 5 sec hold     Walking * 3:30 min limited by left back and hip pain   3 min 704 ft pain 8/10 to right LE     Varied: on toes 2 x 40ft  Heels 2 x 40 ft   High knees side stepping 2 x 40 ft    Rolling*  upper and lower intiation 2 x 8 to side lying only           Sit to stand * 15# KB 2 x 10  Wand/stick over head 2 x 10 reg height seat  2# Wand overhead press 2 x 10, 18 inch height seat  15# KB 2 x 60 sec      Calf raises           Seated lumbar flexion   Red ball x 4  min         Baker carry      10# DB x 3 hallway laps                                       THERAPEUTIC ACTIVITY: (minutes): Activities per gid below to improve functional movement related mobility, strength and balance to improve neuro-muscular carryover to daily functional activities for improving patient's quality of life. Required visual, verbal and manual cues to promote proper body alignment and promote proper body posture/mechanics. Progressed resistance and complexity of movement as indicated. Date:  10/27/2021 Date:  12/8/21 Date:     Activity/Exercise Parameters Parameters Parameters   Sit to stand  30 sec x 2        Walking endurance and speed 6 min walk test  [employer]    5 reps of: Sit to stand 7# DBs and farmer carry x 80 ft     2 reps of: Side stepping 40ft +10 heel raise    2 reps: step tap on stair 2 min + 10 sit to stand weighted overhead push (blue ball)                                                      MANUAL THERAPY: (25 minutes): Joint mobilization, Soft tissue mobilization was utilized and necessary because of the patient's restricted joint motion and restricted motion of soft tissue mobility.         Date  12/15/2021    Technique Used Grade  Level # Time(s) Desired effect/clinical reasoning   Joint mob: PA IV-- Central L-S junction  2 bouts Increase L-S joint mobility into extension to improve tolerance of standing/walking     MFR Skin rolling and gliding bilateral lumbar- sacra region  5 min  To reduce tissue restriction    DTM/STM  Left QL, glute, and lumbar paraspinal 5 min  To reduce triggerpoint/spasm and improve tissue mobility                                  *included in HEP      Treatment/Session Summary:      Response to Treatment:   Discussed importance of continue to challenge self with longer walking/activity time to build strength and endurance. Took time today to address soft tissue-- strain of left paraspinal muscle belly. Also encouraged pt to focus on postural alignment during ADLs and recreation activities and lay on belly intermittently throughout day to decrease hip flexor tightness, low back strain and increase core strength . Communication/Consultation:  Discussed progressing walking tolerance by increasing 30 sec at a time at home-- advised to push self but maintain safety. Equipment provided today:    Recommendations/Intent for next treatment session:   Next visit will focus on Core Stability/strengthening, Pain Science Education Quad and Hip strengthening, functional activity tolerance         Treatment Plan of Care Effective Dates: 10/27/2021 TO 1/25/2022 (90 days).   Frequency/Duration: 2 times a week for 90 Days       Total Treatment Billable Duration: 39  Min   PT Patient Time In/Time Out  Time In: 7804  Time Out: 860 [facility], PT    Future Appointments   Date Time Provider Aria Carrillo   12/17/2021  9:45 AM Aleja Mccloud, PT [facility]   12/29/2021  9:45 AM Aleja Mccloud, PT [facility]   2/8/2022  9:10 AM Everardo Rios MD [facility] [place]

## 2021-12-17 ENCOUNTER — HOSPITAL ENCOUNTER (OUTPATIENT)
Dept: PHYSICAL THERAPY | Age: 56
Discharge: HOME | End: 2021-12-17
Payer: COMMERCIAL

## 2021-12-17 PROCEDURE — 97110 THERAPEUTIC EXERCISES: CPT

## 2021-12-17 NOTE — PROGRESS NOTES
[facility]  [facility] 1965  Payor: Ora Luna / [place]: [place] CR[place] OF [address] [facility] / Product Type: PPO /  [address] [facility] at [address] [place] [address]. [place], [address] [place] [address], [address] [place] [address]  [phone]   [phone]                                                          Steff Anne MD      OUTPATIENT PHYSICAL THERAPY: Daily Treatment Note 2021   Visit Count:  9    Tx Diagnosis:  Generalized Muscle Weakness (M62.81)  Difficulty in walking, Not elsewhere classified (R26.2)  Low Back Pain (M54.5)  Abnormal posture (R29.3)        Pre-treatment Symptoms/Complaints:  pt states the he is doing ok today. Trigger point worked on last time resolved. \"Toe walking made my whole body hurt\"   Pain: Initial:8/10   Medications Last Reviewed:  2021     Post Session: 8/10     Updated Objective Findings:          TREATMENT:   THERAPEUTIC EXERCISE: (55 minutes):  Exercises per grid below to improve mobility, strength and balance. Required minimal visual, verbal and manual cues to promote proper body alignment and promote proper body posture.   Progressed resistance and complexity of movement as indicated.        11/1/21 11/3/21 11/8/21 11/15/21 12/1/21 12/8/21 12/15/21 12/17/21   Activity/Exercise               Education   HEP         Marching   With stick for balance 2 x 5 B          Calf stretching            Stairs            nustep  Level 6 x 10 min  ---occupied-- Level 4 x 10 min hill Level 6 x 10 min  Level 4 x 10 min  Level 4 x 10 min  Level 5 x 10 min  Level 5 x 10 hills    Hip flexor stretch            iso multif    X 3 min        iso hip flex (for TrA)      4 x 10 sec double LE hold at 90/90 with TrA iso      Prone hip ext with knee at 90           Prone ham curls   B x 10   DL x 10    X 10 B     clams    X 10 B X 10 B       piriformis stretch           Side stepping    YTB 2 x 10         Prone hip IR/ER   X 10 B with manual resist   manually        LTR    Wand over head x 5 B X 5       Bridges     x 10 , 10 sec hold  2 X 10, 5 sec hold      Walking * 3:30 min limited by left back and hip pain   3 min 704 ft pain 8/10 to right LE     Varied: on toes 2 x 40ft  Heels 2 x 40 ft   High knees side stepping 2 x 40 ft     Rolling*  upper and lower intiation 2 x 8 to side lying only            Sit to stand * 15# KB 2 x 10  Wand/stick over head 2 x 10 reg height seat  2# Wand overhead press 2 x 10, 18 inch height seat  15# KB 2 x 60 sec    With 10# weight overhead 2 x 10    Calf raises         2 x 10    Seated lumbar flexion   Red ball x 4  min          Baker carry      10# DB x 3 hallway laps       10 # DB 1 big lap 4 small laps    Agility ladder drills         2 min fwd/bkwd side stepping. Step ups         2 X 15 B 6\" step    Step downs         X 5 B 4\" step                                    THERAPEUTIC ACTIVITY: (minutes): Activities per gid below to improve functional movement related mobility, strength and balance to improve neuro-muscular carryover to daily functional activities for improving patient's quality of life. Required visual, verbal and manual cues to promote proper body alignment and promote proper body posture/mechanics. Progressed resistance and complexity of movement as indicated. Date:  10/27/2021 Date:  12/8/21 Date:     Activity/Exercise Parameters Parameters Parameters   Sit to stand  30 sec x 2        Walking endurance and speed 6 min walk test  [employer]    5 reps of: Sit to stand 7# DBs and farmer carry x 80 ft     2 reps of: Side stepping 40ft +10 heel raise    2 reps: step tap on stair 2 min + 10 sit to stand weighted overhead push (blue ball)                                                      MANUAL THERAPY: (0minutes): Joint mobilization, Soft tissue mobilization was utilized and necessary because of the patient's restricted joint motion and restricted motion of soft tissue mobility.         Date  12/15/2021 Technique Used Grade  Level # Time(s) Desired effect/clinical reasoning   Joint mob: PA IV-- Central L-S junction  2 bouts Increase L-S joint mobility into extension to improve tolerance of standing/walking     MFR Skin rolling and gliding bilateral lumbar- sacra region  5 min  To reduce tissue restriction    DTM/STM  Left QL, glute, and lumbar paraspinal 5 min  To reduce triggerpoint/spasm and improve tissue mobility                                  *included in HEP      Treatment/Session Summary:      Response to Treatment:   Pt continues to progress activity tolerance--needed minimal rest during session today. Significant difficulty with calf raise--will continue to work on this at home and during sessions as this is vital for efficient gait. Requires cuing for increased R knee extension and R WBing during standing statically frequently, to encourage functional right quad strength, helping with stair ambulation and transfers. Communication/Consultation:  Discussed progressing walking tolerance by increasing 30 sec at a time at home-- advised to push self but maintain safety. Equipment provided today:    Recommendations/Intent for next treatment session:   Next visit will focus on Core Stability/strengthening, Pain Science Education Quad and Hip strengthening, functional activity tolerance         Treatment Plan of Care Effective Dates: 10/27/2021 TO 1/25/2022 (90 days).   Frequency/Duration: 2 times a week for 90 Days       Total Treatment Billable Duration: 54  Min   PT Patient Time In/Time Out  Time In: 0945  Time Out: 10 [facility],     Future Appointments   Date Time Provider Aria Carrillo   12/29/2021  9:45 AM Shyam Greenberg PT [facility] AND [facility]   2/8/2022  9:10 AM Kaylin Rios MD [facility]

## 2021-12-22 ENCOUNTER — APPOINTMENT (OUTPATIENT)
Dept: PHYSICAL THERAPY | Age: 56
End: 2021-12-22
Payer: COMMERCIAL

## 2021-12-29 ENCOUNTER — APPOINTMENT (OUTPATIENT)
Dept: PHYSICAL THERAPY | Age: 56
End: 2021-12-29
Payer: COMMERCIAL

## 2022-03-18 PROBLEM — E66.09 CLASS 1 OBESITY DUE TO EXCESS CALORIES WITHOUT SERIOUS COMORBIDITY WITH BODY MASS INDEX (BMI) OF 33.0 TO 33.9 IN ADULT: Status: ACTIVE | Noted: 2020-01-16

## 2022-03-18 PROBLEM — M54.50 ACUTE BILATERAL LOW BACK PAIN WITHOUT SCIATICA: Status: ACTIVE | Noted: 2021-08-25

## 2022-03-18 PROBLEM — S39.012A STRAIN OF LUMBAR REGION: Status: ACTIVE | Noted: 2020-01-16

## 2022-03-19 PROBLEM — M43.16 SPONDYLOLISTHESIS AT L3-L4 LEVEL: Status: ACTIVE | Noted: 2018-10-09

## 2022-03-19 PROBLEM — E66.01 SEVERE OBESITY (HCC): Status: ACTIVE | Noted: 2020-03-31

## 2022-03-19 PROBLEM — I25.118 CORONARY ARTERY DISEASE WITH STABLE ANGINA PECTORIS (HCC): Status: ACTIVE | Noted: 2020-08-28

## 2022-03-20 PROBLEM — M48.062 LUMBAR STENOSIS WITH NEUROGENIC CLAUDICATION: Status: ACTIVE | Noted: 2018-10-09

## 2022-06-07 NOTE — PROGRESS NOTES
[address] [facility]. [address], [address] [facility]  [phone]    Patient Name:  Bay Barrientos  YOB: 1965      Office Visit 6/9/2022    CHIEF COMPLAINT:    Chief Complaint   Patient presents with    Sleep Apnea       HISTORY OF PRESENT ILLNESS:      This is a very pleasant 47-year-old gentleman seen in outpatient consultation at the request of Dr. Sanders Holstein for evaluation management of sleep apnea. The patient has a history of being overweight. He also has a history of coronary artery disease and is post bypass surgery. He underwent a home sleep test back in 2020 at which time he was noted to have evidence of moderate obstructive sleep apnea with an AHI of 19.6. He desaturated as low as 76%. Apparently he declined CPAP therapy at that time. Over the past couple of years he has become more sleepy. His [place] score is now consistent with moderate hypersomnia at 16/24 likely related to his sleep disordered breathing. He is now willing to consider being treated with CPAP to control his sleep apnea. His main concern was finding a mask that would work for him since he tends to turn frequently in bed. I showed him several models including the ResMed N30i model which has the tubing connected the crown of the head rather than the front of the mask. This is ideal for patients who turn frequently. He thinks he can tolerate it since it has a very small footprint on the face as well. As noted the patient has a history of being overweight. His BMI at this point is about 33.5. We discussed the importance of weight loss in the management of his sleep apnea. A low glycemic index diet is recommended. I will provide him with our Wheat Belly diet handout which provides an excellent discussion of foods and beverages which may promote more weight gain and those which should be avoided.   I also encouraged him to look at cookbooks associated with this diet since most diets fail because of the lack of variety of foods to eat. This diet was written by a cardiologist and is heart healthy given his underlying coronary disease. Hypothyroidism and at 1 point was on Synthroid. Apparently this medication has been discontinued. His most recent TSH level was normal earlier this year. He also had a CBC obtained which revealed no evidence of polycythemia to suggest severe chronic nocturnal hypoxemia.         Sleep Medicine 6/9/2022   Sitting and reading 3   Watching TV 1   Sitting, inactive in a public place (e.g. a theatre or a meeting) 1   As a passenger in a car for an hour without a break 2   Lying down to rest in the afternoon when circumstances permit 3   Sitting and talking to someone 2   Sitting quietly after a lunch without alcohol 2   In a car, while stopped for a few minutes in traffic 2   Total score 16         HST9/21/2020        Component Ref Range & Units 3/31/22 1029 10/6/21 1004 3/29/21 0930 12/28/20 0914 7/9/20 1450 6/4/20 0851 1/6/20 0846   TSH 0.450 - 4.500 uIU/mL 3.040  2.640  2.550  2.650  3.320  1.550  4.970 High     Resulting Agency  01 01 01 01 01 01 01       Component Ref Range & Units 3/31/22 1029 10/6/21 1004 7/1/21 0922 3/29/21 0930 12/28/20 0914 7/13/20 1212 7/9/20 1450   WBC 3.4 - 10.8 x10E3/uL 8.8  8.9  8.7  7.4  9.1  6.4 R  5.0    RBC 4.14 - 5.80 x10E6/uL 4.27  4.22  4.41  4.29  4.05 Low   4.27 R  4.14    Hemoglobin 13.0 - 17.7 g/dL 14.2  13.7  14.3  13.9  13.1  13.3 Low  R  13.4    Hematocrit 37.5 - 51.0 % 42.2  41.5  43.4  41.2  39.2  40.8 Low  R  40.0    MCV 79 - 97 fL 99 High   98 High   98 High   96  97  95.6 R  97    MCH 26.6 - 33.0 pg 33.3 High   32.5  32.4  32.4  32.3  31.1 R  32.4    MCHC 31.5 - 35.7 g/dL 33.6  33.0  32.9  33.7  33.4  32.6 R  33.5    RDW 11.6 - 15.4 % 12.3  12.6  12.3  12.2  13.0  13.8 R  13.1    Platelets 508 - 255 [id number]/  333  294  316  285  292 R  310          Past Medical History:   Diagnosis Date    Aneurysm [facility])     followed by family doctor - not large enough for vascular yet    CAD (coronary artery disease)     mi--2014--- cabg 2014--- followed by dr Dania Gregorio; also had 2 stents post CABG    Chronic pain     lumbar    Depressive disorder, not elsewhere classified     on med    Essential hypertension, benign     controlled with med- affirms would be SOB w 1 flight of steps    GERD (gastroesophageal reflux disease)     controlled with med    Obese     bmi 35.81    Pure hypercholesterolemia     Sleep apnea     dx long ago-- per pt-- never had any follow up- wife says he no longer snores since CABG    Thyroid disease     managed with medications         [unfilled]      Past Surgical History:   Procedure Laterality Date   [facility] SURGERY      DR Araceli Fisher, L3&4 August 2020   [address]      Dr Araceli Fisher, november 2018    COLONOSCOPY N/A 8/17/2018    COLONOSCOPY/ 32 performed [facility] at [facility]COPY    COLONOSCOPY FLX DX W/COLLJ SPEC WHEN PFRMD  8/17/2018         KNEE ARTHROSCOPY Right YRS AGO    LUMBAR LAMINECTOMY  08/10/2020    right L3-4 lami with lysis of adhesions Dr. Betancur Able  08/2018    [facility]    LA CARDIAC SURG PROCEDURE UNLIST  2014    CABG     LA CARDIAC SURG PROCEDURE UNLIST  2015    1 stent       [unfilled]    Social History     Socioeconomic History    Marital status:      Spouse name: Not on file    Number of children: Not on file    Years of education: Not on file    Highest education level: Not on file   Occupational History    Not on file   Tobacco Use    Smoking status: Former Smoker     Packs/day: 0.50    Smokeless tobacco: Never Used    Tobacco comment: Quit smoking: quit Oct 2018   Substance and Sexual Activity    Alcohol use:  Yes    Drug use: No    Sexual activity: Not on file   Other Topics Concern    Not on file   Social History Narrative    Not on file     Social Determinants of Health     Financial Resource Strain:     Difficulty of Paying Living Expenses: Not on file   Food Insecurity:     Worried About Running Out of Food in the Last Year: Not on file    Charles of Food in the Last Year: Not on file   Transportation Needs:     Lack of Transportation (Medical): Not on file    Lack of Transportation (Non-Medical):  Not on file   Physical Activity:     Days of Exercise per Week: Not on file    Minutes of Exercise per Session: Not on file   Stress:     Feeling of Stress : Not on file   Social Connections:     Frequency of Communication with Friends and Family: Not on file    Frequency of Social Gatherings with Friends and Family: Not on file    Attends Confucianism Services: Not on file    Active Member of Clubs or Organizations: Not on file    Attends Club or Organization Meetings: Not on file    Marital Status: Not on file   Intimate Partner Violence:     Fear of Current or Ex-Partner: Not on file    Emotionally Abused: Not on file    Physically Abused: Not on file    Sexually Abused: Not on file   Housing Stability:     Unable to Pay for Housing in the Last Year: Not on file    Number of [place] in the Last Year: Not on file    Unstable Housing in the Last Year: Not on file         Family History   Problem Relation Age of Onset    Diabetes Sister         type 2; insulin dep    Hypertension Father     Heart Disease Father         CABG, no MI     Cancer Mother         leukemia    Elevated Lipids Father          No Known Allergies      Current Outpatient Medications   Medication Sig    desvenlafaxine succinate (PRISTIQ) 100 MG TB24 extended release tablet Take 100 mg by mouth daily    acetaminophen (TYLENOL) 325 mg tablet Take 650 mg by mouth every 6 hours as needed for Pain    amLODIPine (NORVASC) 10 MG tablet Take 10 mg by mouth 2 times daily    aspirin 81 MG EC tablet Take by mouth daily    atorvastatin (LIPITOR) 80 MG tablet Take 80 mg by mouth daily    DULoxetine (CYMBALTA) 60 MG extended release capsule Take 60 mg by mouth 2 times daily    Evolocumab 140 MG/ML SOAJ Inject 140 mg into the skin    isosorbide mononitrate (IMDUR) 120 MG extended release tablet Take 120 mg by mouth 2 times daily    lisinopril (PRINIVIL;ZESTRIL) 20 MG tablet Take by mouth daily    metoprolol (LOPRESSOR) 100 MG tablet Take 100 mg by mouth 2 times daily    nitroGLYCERIN (NITROSTAT) 0.4 MG SL tablet Place under the tongue    omeprazole (PRILOSEC OTC) 20 MG tablet Take 20 mg by mouth daily    prasugrel (EFFIENT) 10 MG TABS Take 10 mg by mouth daily    ranolazine (RANEXA) 500 MG extended release tablet Take 500 mg by mouth     No current facility-administered medications for this visit. REVIEW OF SYSTEMS:   CONSTITUTIONAL:   There is no history of fever, chills, night sweats, weight loss, weight gain; he has had persistent fatigue and lethargy/hypersomnolence. EYES:   Denies problems with eye pain, erythema, blurred vision, or visual field loss. ENTM:   Denies history of tinnitus, epistaxis, sore throat, hoarseness, or dysphonia. LYMPH:   Denies swollen glands. CARDIAC:   No chest pain, pressure, discomfort, palpitations, orthopnea, murmurs, or edema. GI:   No dysphagia, heartburn reflux, nausea/vomiting, diarrhea, abdominal pain, or bleeding. :   Denies history of dysuria, hematuria, polyuria, or decreased urine output. MS:   No history of myalgias, arthralgias, bone pain, or muscle cramps. SKIN:   No history of rashes, jaundice, cyanosis, nodules, or ulcers. ENDO:   Negative for heat or cold intolerance. No history of DM. PSYCH:   Negative for anxiety, depression, insomnia, hallucinations. NEURO:   There is no history of AMS, persistent headache, decreased level of consciousness, seizures, or motor or sensory deficits.       PHYSICAL EXAM:    Vitals:    06/09/22 0801   BP: 104/60   Pulse: 61   Resp: 20   Temp: 97.2 °F (36.2 °C)   SpO2: 98%        GENERAL APPEARANCE:   The patient is overweight and in no respiratory distress. HEENT:   PERRL. Conjunctivae unremarkable. Nasal mucosa is without epistaxis, exudate, or polyps. Gums and dentition are unremarkable. There is moderate oropharyngeal narrowing. NECK/LYMPHATIC:   Symmetrical with no elevation of jugular venous pulsation. Trachea midline. No thyroid enlargement. No cervical adenopathy. LUNGS:   Normal respiratory effort with symmetrical lung expansion. Breath sounds are clear bilaterally. HEART:   There is a regular rate and rhythm. No murmur, rub, or gallop. There is no edema in the lower extremities. ABDOMEN:   Soft and non-tender. Bowel sounds are normal.     SKIN:   There are no rashes, cyanosis, jaundice, or ecchymosis present. EXTREMITIES:   The extremities are unremarkable without clubbing, cyanosis, joint inflammation, degenerative, or ischemic change. MUSCULOSKELETAL:   There is no abnormal tone, muscle atrophy, or abnormal movement present. NEURO:   The patient is alert and oriented to person, place, and time. Memory appears intact and mood is normal.  No gross sensorimotor deficits are present. ASSESSMENT:  (Medical Decision Making)      ICD-10-CM    1. BENITA (obstructive sleep apnea)  G47.33 The patient has a history of moderate obstructive sleep apnea associated with severe arterial hypoxemia. At this point he is having significant issues with hypersomnia and is willing to try CPAP therapy. We will try him with a nasal mask using an AutoSet over a broad pressure range. 2. Obesity (BMI 30.0-34. 9)  E66.9 The patient's BMI is about 33.5. Weight loss is needed through caloric restriction particular of simple carbohydrates as well as increase physical activity. The Wheat Belly diet handout is provided and he is encouraged to possibly get cookbooks associated with this diet to help with future food choices.        3. Essential hypertension, benign  I10 The patient is on multiple blood pressure medications. With correction of his sleep apnea, his blood pressure may improve and he may actually need less blood pressure medicine in the future especially if he is able to lose weight. 4. Hypersomnia  G47.10 This should rapidly improve with correction of his sleep disordered breathing. PLAN:        Orders Placed This Encounter   Procedures    DME - [address] [facility]  Phone: [address] [address]  Dept: [phone]      Patient Name: Sil Cotto  : 1965  Gender: male  Address: [place] [id number]  Patient phone number: [phone] (home)       Primary Insurance: Payor: Derian Jennings / Plan: Cayetano Stevens / Product Type: *No Product type* /   Subscriber ID: [id number] - (Hernando FARMER)      AMB Supply Order  Order Details     DME Location: [address] [place] [address]   Order Date: 2022   The primary encounter diagnosis was BENITA (obstructive sleep apnea). Diagnoses of Obesity (BMI 30.0-34.9), Essential hypertension, benign, and Hypersomnia were also pertinent to this visit.           (  X   )New Set-Up    machine   (     ) CPAP Unit  (  x   ) Auto CPAP Unit  (     ) BiLevel Unit  (     ) Auto BiLevel Unit  (     ) ASV   (     ) Bilevel ST    (     ) Oxygen Concentrator         Length of need: 12 months    Pressure: 5-18 cmH20  EPR: To     Starting Ramp Pressure:  5 cm H20  Ramp Time: min and a    Patient had a diagnostic Apnea Hypopnea Index (AHI) of : 19.6    *SUPPLIES* Replace all as needed, or per coverage guidelines     Masks Type:    (     ) -Full Face Mask (1 per 3 mon)  (     ) -Full Mask (1 per month) Interface/Cushion      (  x   ) -Nasal Mask (1 per 3 mon) <<< Resmed N30i >>>  (  x   ) - Nasal Mask (1 per month) Interface/Cushion  (     ) -Pillow (2 per mon)  (  x   ) -Wrszknxrr (1 per 6 mon)      _________________________________________________________________          Other Supplies:    (  X   )-Qahsgotg (1 per 6 mon)  ( X    )-Hywsop Tubing (1 per 3 mon)  (  X   )- Disposable Filter (2 per mon)  (   X  )-Qswrnj Humidifier (1 per year)     (  x   )-Sbniyusrp (sometimes used with Full Face Mask) (1 per 6 mos)  (     )-Tubing-without heat (1 per 3 mos)  ( X   )-Non-Disposable Filter (1 per 6 mos)  (   x  )-Water Chamber (1 per 6 mos)  (     )-Humidifier non-heated (1 per 5 yrs)      Signed Date: 6/9/2022  Electronically Signed By: Karsten Paulson MD      No orders of the defined types were placed in this encounter. Karsten Paulson MD  Electronically signed    Over 50% of today's office visit was spent in face to face time reviewing test results, prognosis, importance of compliance, education about disease process, benefits of medications, instructions for management of acute flare-ups, and follow up plans. Total face to face time spent with the patient and charting was 35 minutes. Dictated using voice recognition software.   Proof read but unrecognized errors may exist.

## 2022-06-09 ENCOUNTER — OFFICE VISIT (OUTPATIENT)
Dept: SLEEP MEDICINE | Age: 57
End: 2022-06-09
Payer: COMMERCIAL

## 2022-06-09 VITALS
DIASTOLIC BLOOD PRESSURE: 60 MMHG | SYSTOLIC BLOOD PRESSURE: 104 MMHG | BODY MASS INDEX: 33.62 KG/M2 | HEIGHT: 69 IN | HEART RATE: 61 BPM | RESPIRATION RATE: 20 BRPM | OXYGEN SATURATION: 98 % | WEIGHT: 227 LBS | TEMPERATURE: 97.2 F

## 2022-06-09 DIAGNOSIS — G47.33 OSA (OBSTRUCTIVE SLEEP APNEA): Primary | ICD-10-CM

## 2022-06-09 DIAGNOSIS — E66.9 OBESITY (BMI 30.0-34.9): ICD-10-CM

## 2022-06-09 DIAGNOSIS — I10 ESSENTIAL HYPERTENSION, BENIGN: ICD-10-CM

## 2022-06-09 DIAGNOSIS — G47.10 HYPERSOMNIA: ICD-10-CM

## 2022-06-09 PROBLEM — R29.818 SUSPECTED SLEEP APNEA: Status: ACTIVE | Noted: 2022-06-09

## 2022-06-09 PROCEDURE — 99204 OFFICE O/P NEW MOD 45 MIN: CPT | Performed by: INTERNAL MEDICINE

## 2022-06-09 RX ORDER — DESVENLAFAXINE 100 MG/1
100 TABLET, EXTENDED RELEASE ORAL DAILY
COMMUNITY

## 2022-06-09 RX ORDER — ACETAMINOPHEN 325 MG/1
650 TABLET ORAL EVERY 6 HOURS PRN
COMMUNITY

## 2022-06-09 ASSESSMENT — SLEEP AND FATIGUE QUESTIONNAIRES
ESS TOTAL SCORE: 16
HOW LIKELY ARE YOU TO NOD OFF OR FALL ASLEEP IN A CAR, WHILE STOPPED FOR A FEW MINUTES IN TRAFFIC: 2
HOW LIKELY ARE YOU TO NOD OFF OR FALL ASLEEP WHILE SITTING QUIETLY AFTER LUNCH WITHOUT ALCOHOL: 2
HOW LIKELY ARE YOU TO NOD OFF OR FALL ASLEEP WHILE LYING DOWN TO REST IN THE AFTERNOON WHEN CIRCUMSTANCES PERMIT: 3
HOW LIKELY ARE YOU TO NOD OFF OR FALL ASLEEP WHILE SITTING INACTIVE IN A PUBLIC PLACE: 1
HOW LIKELY ARE YOU TO NOD OFF OR FALL ASLEEP WHEN YOU ARE A PASSENGER IN A CAR FOR AN HOUR WITHOUT A BREAK: 2
HOW LIKELY ARE YOU TO NOD OFF OR FALL ASLEEP WHILE SITTING AND READING: 3
HOW LIKELY ARE YOU TO NOD OFF OR FALL ASLEEP WHILE WATCHING TV: 1
HOW LIKELY ARE YOU TO NOD OFF OR FALL ASLEEP WHILE SITTING AND TALKING TO SOMEONE: 2

## 2022-06-09 ASSESSMENT — PATIENT HEALTH QUESTIONNAIRE - PHQ9
SUM OF ALL RESPONSES TO PHQ QUESTIONS 1-9: 0
2. FEELING DOWN, DEPRESSED OR HOPELESS: 0
SUM OF ALL RESPONSES TO PHQ QUESTIONS 1-9: 0
SUM OF ALL RESPONSES TO PHQ9 QUESTIONS 1 & 2: 0
SUM OF ALL RESPONSES TO PHQ QUESTIONS 1-9: 0
1. LITTLE INTEREST OR PLEASURE IN DOING THINGS: 0
SUM OF ALL RESPONSES TO PHQ QUESTIONS 1-9: 0

## 2022-06-09 NOTE — PATIENT INSTRUCTIONS
WHAT IS THE WHEAT BELLY DIET? Wheat Belly: Lose the Wheat, Lose the Weight, and Find Your Path Back to Health is the book by the renowned cardiologist, Dr. Anahy Brewster, which explains how eliminating wheat from our diets can result in numerous health benefits, including weight loss. Wheat Belly is really about two things:     1) eliminating wheat (and other gluten-containing grains such as barley and rye), and   2) managing carbohydrates/sugars to individual tolerance levels to manage blood sugar and promote weight-loss       The theory is that wheat promotes high blood sugar which though a series of reactions, causes the body to accumulate more visceral fat. The Plan   Wheat isn't the only bad boy in this diet. Many other foods are either eliminated or eaten in limited quantities such as fruit, starchy veggies, whole grains, legumes, dried fruit, corn starch and corn meal. Three meals a day are encouraged without any snacks. So what can you eat? Heres the basic list:     In unlimited quantities:   Veggies (except potatoes and corn)   Raw nuts and seeds   Oils   Meats and eggs   Cheese   Non-sugary condiments (like mustard and salsa)   Ground flaxseed   Avocados, olives, coconut   Spices   Unsweetened cocoa        In limited quantities:   Dairy (except cheese)   Fruit   Whole corn (like on the cob)   Fruit juice   Non-wheat, non-gluten grains (like quinoa, amaranth, and rice)   Beans, peas, and lentils   Soy products (fermented soy products preferred)  Unlike other diet plans, there is basically one phase: toss out all your wheat foods and go cold turkey. Once you go wheat free, the author claims that the pounds will come right off and youll immediately feel better. Fasting, defined as drinking water only, is highly recommended for several days or even weeks at a time. How to Eat a Wheat Belly Diet in a Healthy Way  Best Wheat Belly Foods to Eat:   All varieties of fresh vegetables, especially those that are non-starchy and low in calories. These include things like cruciferous veggies (broccoli or [place] sprouts, for example), leafy greens, peppers, mushrooms, asparagus, artichoke, etc.   Fresh fruit (but not processed juices), including berries, apples, melon, and citrus fruits like grapefruit or oranges. Some people prefer to eat mostly low-sugar fruits but avoid those higher in sugar like pineapple, papaya, cal or banana. Healthy fats like coconut oil or olive oil, raw nuts and seeds, avocado, coconut milk, olives, cocoa butter, and grass-fed butter or ghee. Grass-fed, humanely raised meat and eggs, plus wild-caught fish. Full-fat cheeses (ideally made from raw, organic milk). Fermented foods like unsweetened kefir or yogurt, pickled or cultured vegetables, and in moderation tofu, tempeh, miso and natto. If theyre well-tolerated, unprocessed grains in moderation, including quinoa, millet, buckwheat (not actually a type of wheat), brown rice and amaranth. Worst Wheat Belly Foods to Avoid:    Eating a wheat belly diet means avoiding anything made with the grains wheat, barley, rye, spelt or certain oats. Additionally, Terressa Hipps recommends avoiding added sugar, condiments that include synthetic or chemically altered ingredients, sugary drinks and other processed foods as much as possible. Below are the main foods to exclude from your diet if you choose to try following this dietary plan:  Grain-based desserts, including both packaged or homemade cakes, cookies, donuts, pies, crisps, cobblers, and granola bars   Breads, especially those made with refined wheat flour. Even many gluten-free breads or packaged products should not contribute many calories to your diet.  While products made from grains other than wheat (like corn or rice) might be free of gluten, theyre still usually not very nutrient-dense and are inferior to eating whole, sprouted ancient grains like oats, quinoa, wild rice or teff, for example. Plus, modern food-processing techniques usually contaminate these foods with gluten since theyre processed using the same equipment that wheat is. Most cereals   Pizza   Pasta and noodles   Chips and crackers   Wheat tortillas, wraps, burritos and tacos   Fast food   Take-out, including most [place] or [place] dishes, burgers and deli sandwiches   Breaded proteins like chicken cutlets, processed meats, hot dogs and frozen veggie burgers   Added sugar, including high fructose corn syrup, sucrose, dried fruit, juices and sugary beverage   Processed rice and potato products   Trans fats, fried foods and cured meats  Tips for Giving Up Wheat and Processed Grains:  When grocery shopping, check ingredients carefully and look for products made without wheat, rye and barley. This might mean choosing certified gluten-free items in some cases, although even these can be highly processed. The most substantial sources of wheat in your diet are likely bread or baked products made with wheat flour (like pizza, pasta at restaurants, bread, etc.), so unless specifically noted that these are grain- or gluten-free, assume they contain wheat. If you are going to buy bread, look for sourdough or sprouted grain breads (like Esteban bread), which are usually better tolerated than ordinary wheat-flour breads. When it comes to baking or using flour in recipes, try some of these naturally gluten-free flour alternatives over wheat flour: brown rice, quinoa, chickpea, almond and coconut flour. Remember that wheat is hiding in many condiments, sauces, dressings, etc. Avoid any that contain flour or added sugar, sticking with basic condiments or flavor enhancers like vinegar, herbs, spices and real bone broth. Many types of alcohol, including beer, also contain wheat.  Hard liquor and wine are better options, however watch the amount you consume and what you mix these with.

## 2022-06-29 ENCOUNTER — OFFICE VISIT (OUTPATIENT)
Dept: ORTHOPEDIC SURGERY | Age: 57
End: 2022-06-29
Payer: COMMERCIAL

## 2022-06-29 VITALS — HEIGHT: 68 IN | BODY MASS INDEX: 33.22 KG/M2 | WEIGHT: 219.2 LBS

## 2022-06-29 DIAGNOSIS — M47.816 LUMBAR FACET ARTHROPATHY: Primary | ICD-10-CM

## 2022-06-29 DIAGNOSIS — Z98.1 S/P LUMBAR FUSION: ICD-10-CM

## 2022-06-29 DIAGNOSIS — M43.16 SPONDYLOLISTHESIS OF LUMBAR REGION: ICD-10-CM

## 2022-06-29 DIAGNOSIS — M54.16 LUMBAR RADICULOPATHY: ICD-10-CM

## 2022-06-29 PROCEDURE — 99204 OFFICE O/P NEW MOD 45 MIN: CPT | Performed by: NURSE PRACTITIONER

## 2022-06-29 NOTE — PROGRESS NOTES
Name: Eliana Larson  YOB: 1965  Gender: male  MRN: [id number]    CC: Back pain, bilateral leg pain    HPI: This is a 60y.o. year old male who I previously saw back in 2017. He has a unilateral L34 fusion by Dr. Alexus Spicer 10 years ago. Unfortunately he has battled chronic back pain since. Most recently he was evaluated by Dr. Holguin Factor Dr. Alexus Spicer. This was last year. A repeat MRI was obtained. This revealed lower lumbar facet arthropathy spondylolisthesis of L4 and L5 but no significant central stenosis. He has complaints of pain across the lower back primarily in the left lower back rating down the anterior leg to his knee. He also has some medial thigh burning and pain. He reports that his back pain is worse than his leg pain. He is on a daily blood thinner. Thus far, the patient has tried : He recently completed physical therapy in January and February 2022. Patient actually did physical therapy for almost a year. He has tried pain medication, previous steroids. Current pain level: 8/10  Activities limited by pain: Ambulating any distance, any kind of exertional activities sitting or standing too long. AMB PAIN ASSESSMENT 6/29/2022   Location of Pain Back   Location Modifiers Right;Left   Frequency of Pain Constant   Limiting Behavior Yes   Result of Injury No   Work-Related Injury No   Are there other pain locations you wish to document? No            ROS/Meds/PSH/PMH/FH/SH: I personally reviewed the patient's collected intake data.   Below are the pertinents:    No Known Allergies      Current Outpatient Medications:     acetaminophen (TYLENOL) 325 mg tablet, Take 650 mg by mouth every 6 hours as needed for Pain, Disp: , Rfl:     amLODIPine (NORVASC) 10 MG tablet, Take 10 mg by mouth 2 times daily, Disp: , Rfl:     aspirin 81 MG EC tablet, Take by mouth daily, Disp: , Rfl:     atorvastatin (LIPITOR) 80 MG tablet, Take 80 mg by mouth daily, Disp: , Rfl:    DULoxetine (CYMBALTA) 60 MG extended release capsule, Take 60 mg by mouth 2 times daily, Disp: , Rfl:     Evolocumab 140 MG/ML SOAJ, Inject 140 mg into the skin, Disp: , Rfl:     isosorbide mononitrate (IMDUR) 120 MG extended release tablet, Take 120 mg by mouth 2 times daily, Disp: , Rfl:     lisinopril (PRINIVIL;ZESTRIL) 20 MG tablet, Take by mouth daily, Disp: , Rfl:     metoprolol (LOPRESSOR) 100 MG tablet, Take 100 mg by mouth 2 times daily, Disp: , Rfl:     omeprazole (PRILOSEC OTC) 20 MG tablet, Take 20 mg by mouth daily, Disp: , Rfl:     prasugrel (EFFIENT) 10 MG TABS, Take 10 mg by mouth daily, Disp: , Rfl:     ranolazine (RANEXA) 500 MG extended release tablet, Take 500 mg by mouth, Disp: , Rfl:     desvenlafaxine succinate (PRISTIQ) 100 MG TB24 extended release tablet, Take 100 mg by mouth daily (Patient not taking: Reported on 6/29/2022), Disp: , Rfl:     nitroGLYCERIN (NITROSTAT) 0.4 MG SL tablet, Place under the tongue (Patient not taking: Reported on 6/29/2022), Disp: , Rfl:     Past Surgical History:   Procedure Laterality Date   [facility] SURGERY      DR Latha Vale, L3&4 August 2020   [place] Vale, november 2018    COLONOSCOPY N/A 8/17/2018    COLONOSCOPY/ 32 performed [facility]ola Farrell [facility] ENDOSCOPY    COLONOSCOPY FLX DX W/COLLJ SPEC WHEN PFRMD  8/17/2018         KNEE ARTHROSCOPY Right YRS AGO    LUMBAR LAMINECTOMY  08/10/2020    right L3-4 lami with lysis of adhesions Dr. Norm Sapp  08/2018    [facility]   [address] PROCEDURE UNLIST  2014    CABG     [place] CARDIAC SURG PROCEDURE UNLIST  2015    1 stent       Patient Active Problem List   Diagnosis    Acute bilateral low back pain without sciatica    Strain of lumbar region    Class 1 obesity due to excess calories without serious comorbidity with body mass index (BMI) of 33.0 to 33.9 in adult    Essential hypertension, benign    Spondylolisthesis at L3-L4 level    Coronary artery disease with stable angina pectoris [facility] [address]    Pure hypercholesterolemia    Obesity (BMI 30.0-34. 9)    Major depressive disorder with single episode, in partial remission ([address]    Lumbar stenosis with neurogenic claudication    BENITA (obstructive sleep apnea)    Hypersomnia         Tobacco:  reports that he has quit smoking. He smoked 0.50 packs per day. He has never used smokeless tobacco.  Alcohol:   Social History     Substance and Sexual Activity   Alcohol Use Yes        Physical Exam:   Body mass index is 33.82 kg/m². GENERAL:  Adult in no acute distress, well developed, well nourished Patient is appropriately conversant  MSK:  Examination of the lumbar spine reveals paraspinal tenderness    There is moderate tenderness to palpation along the spinous processes and paraspinal musculature. facet loading maneuver bilaterally causing significant pain. The patient ambulates with a forward flexed gait. ROM of bilateral hip(s) reveals no irritability. NEURO:  Cranial nerves grossly intact. No motor deficits. Straight leg testing is negative bilateral  Sensory testing reveals intact sensation to light touch and in the distribution of the L3-S1 dermatomes bilaterally  Ankle jerk is negative for clonus    Reflexes   Right Left   Quadriceps (L4) 2 2   Achilles (S1) 2 2     Strength testing in the lower extremity reveals the following based on the 5 point grading scale:     HF (L2) H Ab (L5) KE (L3/4) ADF (L4) EHL (L5) A Ev (S1) APF (S1)   Right 5 5 5 5 5 5 5   Left 5 5 5 5 5 5 5     PSYCH:  Alert and oriented X 3. Appropriate affect. Intact judgment and insight. Radiographic Studies:     AP, lateral and spot views of the lumbar spine:      Patient has a history of L3-4 fusion. There is an anterior listhesis of L4 and L5. Lower lumbar facet arthropathy. Loss of lordosis. Multilevel disc degeneration.     MRI lumbar spine images independently reviewed:     FINDINGS:   Prior L3-4 left transpedicular screw and wilda fixation. There is unchanged L3 on   L4 anterolisthesis. Modic type degenerative signal changes of the paired   endplates at K6-G2. No acute fracture evident. The intervertebral discs are   diffusely dehydrated with height loss at multiple levels.       L1-2: Broad-based disc bulge and mild facet arthrosis without sniffing and   narrowing of the central spinal canal or neural foramina. L2-3:     Broad-based disc bulge, ligamentum flavum hypertrophy, and facet   arthrosis resulting in mild spinal canal stenosis and moderate bilateral   neuroforaminal narrowing. L3-4:     Disc pseudobulge and facet arthrosis resulting in moderate right and   mild left neuroforaminal narrowing without significant spinal canal stenosis. L4-5:     Broad-based disc bulge, ligamentum flavum hypertrophy, and facet   arthrosis resulting in mild to moderate bilateral neuroforaminal narrowing and   mild spinal canal stenosis. L5-S1:  No significant spinal canal stenosis or neuroforaminal narrowing.       Aneurysm of the infrarenal abdominal aorta measuring 3.0 cm AP.           Impression   1.  Prior left L3-4 posterior fixation without complication evident. 2.  Multilevel lumbar spondylosis and facet arthrosis without significant change   when compared to the 7/2020 exam.   3.  Aneurysm of the infrarenal abdominal aorta measuring up to 3.0 cm.               Assessment/Plan:       Diagnosis Orders   1. Lumbar facet arthropathy     2. Lumbar radiculopathy     3. Spondylolisthesis of lumbar region     4. S/P lumbar fusion         This patient's clinical history and physical exam is consistent with spondylitic  back pain. Does have an anterior listhesis of L4 and L5.   Currently not getting significant nerve impingement from this but if the facet injections are ineffective then I would recommend trialing an epidural.  At this point I would not recommend any further surgery unless he has positive effect from a possible epidural.  His primary complaint is low back pain which is most likely facet driven. I discussed with him the natural history of this condition in that most episodes are typically self limited. However, the symptoms can last for several months if not even longer. What I currently recommend is that he continues with conservative treatments to help cope with the symptoms and avoid having back surgery at this time. He understands that conservative treatments typically include activity modification, NSAIDs and physical therapy. Oral and/or epidural steroids could be considered in resistant scenarios. Also, he  may want to explore chiropractic care or acupuncture. I advised to avoid any prolonged bedrest and to try to maintain ADLs as much as possible. The patient was counseled to follow up me should he  develop any neurologic symptoms such as leg pain. - Injection: The patient will be referred for a lumbar steroid injection to help reduce the symptoms. The patient understands the risks including hyperglycemia, immunosuppression, meningitis, cerebrospinal fluid leak, epidural hematoma, and reaction to medication. The patient may benefit from additional injections depending on the response. The injection should be a Bilateral L4-S1 intra-articular, with possible radiofrequency ablations if he gets a positive effect from this injection. No orders of the defined types were placed in this encounter. No orders of the defined types were placed in this encounter. 4 This is a chronic illness/condition with exacerbation and progression      Return for Injection follow up. MONO Cline CNP  06/29/22      Elements of this note were created using speech recognition software. As such, errors of speech recognition may be present.

## 2022-06-29 NOTE — LETTER
[address] [place] [address],[facility] [id number]  Phone: [phone]  Fax: [phone]    MONO Branham CNP        June 29, 2022     Patient: Roxie Gomez   YOB: 1965   Date of Visit: 6/29/2022       To Whom It May Concern:    Patient is under my care for her spine complaints. We are wanting to schedule patient for a lumbar steriod injection. Patient will need to come off his effient 3-5 days prior and may resume day after. Please respond via [employer] to [employer], contact Winter at [phone]     If you have any questions or concerns, please don't hesitate to call. Sincerely,        MONO Branham CNP       .

## 2022-07-07 ENCOUNTER — OFFICE VISIT (OUTPATIENT)
Dept: FAMILY MEDICINE CLINIC | Facility: CLINIC | Age: 57
End: 2022-07-07
Payer: COMMERCIAL

## 2022-07-07 VITALS
WEIGHT: 219 LBS | BODY MASS INDEX: 33.19 KG/M2 | HEIGHT: 68 IN | DIASTOLIC BLOOD PRESSURE: 60 MMHG | SYSTOLIC BLOOD PRESSURE: 130 MMHG

## 2022-07-07 DIAGNOSIS — I10 ESSENTIAL HYPERTENSION, BENIGN: ICD-10-CM

## 2022-07-07 DIAGNOSIS — A09 DIARRHEA OF INFECTIOUS ORIGIN: ICD-10-CM

## 2022-07-07 DIAGNOSIS — R50.81 FEVER IN OTHER DISEASES: Primary | ICD-10-CM

## 2022-07-07 DIAGNOSIS — R52 BODY ACHES: ICD-10-CM

## 2022-07-07 LAB
EXP DATE SOLUTION: NORMAL
EXP DATE SWAB: NORMAL
INFLUENZA A ANTIGEN, POC: NEGATIVE
INFLUENZA B ANTIGEN, POC: NEGATIVE
LOT NUMBER SOLUTION: NORMAL
LOT NUMBER SWAB: NORMAL
SARS-COV-2 RNA, POC: NEGATIVE
VALID INTERNAL CONTROL, POC: YES

## 2022-07-07 PROCEDURE — 87635 SARS-COV-2 COVID-19 AMP PRB: CPT | Performed by: FAMILY MEDICINE

## 2022-07-07 PROCEDURE — 99214 OFFICE O/P EST MOD 30 MIN: CPT | Performed by: FAMILY MEDICINE

## 2022-07-07 PROCEDURE — 87804 INFLUENZA ASSAY W/OPTIC: CPT | Performed by: FAMILY MEDICINE

## 2022-07-07 RX ORDER — CIPROFLOXACIN 500 MG/1
500 TABLET, FILM COATED ORAL 2 TIMES DAILY
Qty: 14 TABLET | Refills: 0 | Status: SHIPPED | OUTPATIENT
Start: 2022-07-07 | End: 2022-07-14

## 2022-07-07 RX ORDER — OMEPRAZOLE 20 MG/1
CAPSULE, DELAYED RELEASE ORAL
COMMUNITY
Start: 2022-06-26

## 2022-07-07 ASSESSMENT — ANXIETY QUESTIONNAIRES
GAD7 TOTAL SCORE: 0
1. FEELING NERVOUS, ANXIOUS, OR ON EDGE: 0
7. FEELING AFRAID AS IF SOMETHING AWFUL MIGHT HAPPEN: 0
4. TROUBLE RELAXING: 0
5. BEING SO RESTLESS THAT IT IS HARD TO SIT STILL: 0
2. NOT BEING ABLE TO STOP OR CONTROL WORRYING: 0
3. WORRYING TOO MUCH ABOUT DIFFERENT THINGS: 0
IF YOU CHECKED OFF ANY PROBLEMS ON THIS QUESTIONNAIRE, HOW DIFFICULT HAVE THESE PROBLEMS MADE IT FOR YOU TO DO YOUR WORK, TAKE CARE OF THINGS AT HOME, OR GET ALONG WITH OTHER PEOPLE: NOT DIFFICULT AT ALL
6. BECOMING EASILY ANNOYED OR IRRITABLE: 0

## 2022-07-07 ASSESSMENT — PATIENT HEALTH QUESTIONNAIRE - PHQ9
SUM OF ALL RESPONSES TO PHQ QUESTIONS 1-9: 0
SUM OF ALL RESPONSES TO PHQ QUESTIONS 1-9: 0
1. LITTLE INTEREST OR PLEASURE IN DOING THINGS: 0
SUM OF ALL RESPONSES TO PHQ QUESTIONS 1-9: 0
SUM OF ALL RESPONSES TO PHQ QUESTIONS 1-9: 0

## 2022-07-07 ASSESSMENT — ENCOUNTER SYMPTOMS
EYES NEGATIVE: 1
DIARRHEA: 1
ABDOMINAL PAIN: 1
BLURRED VISION: 0
RESPIRATORY NEGATIVE: 1

## 2022-07-07 NOTE — PROGRESS NOTES
[address] [place] [address]  _______________________________________  Cristofer Grayson MD                 [address] [place] [address],  [address]. [place], [address] [place] [address]                     Sabrina Meier [address]                                                                                    Phone: [phone]                                                                                    Fax: [phone]    Ananda Baldwin is a 60 y.o. male who is seen for evaluation of   Chief Complaint   Patient presents with    Abdominal Pain    Diarrhea       HPI:   Abdominal Pain  Episode onset: Abdominal pain with cramping and diarrhea for the last 10 to 14 days, no fever noted, does have chills and fatigue. Associated symptoms include diarrhea. His past medical history is significant for GERD. Diarrhea   Episode frequency: Diarrhea loose at first and now watery. Associated symptoms include abdominal pain. Pertinent negatives include no chills. Hypertension  This is a chronic problem. Episode onset: Hypertension controlled medication without breakthrough, no side effects needs refills. Pertinent negatives include no anxiety, blurred vision, chest pain, malaise/fatigue or neck pain. Gastroesophageal Reflux  He complains of abdominal pain. He reports no chest pain. Episode onset: Acid reflux controlled on medication no breakthrough needs refills. Hyperlipidemia  This is a chronic problem. The current episode started more than 1 year ago. The problem is controlled. Recent lipid tests were reviewed and are normal. Hyperlipidemia controlled medication needs refills recheck fasting labs. Pertinent negatives include no chest pain. Chief Complaint   Patient presents with    Abdominal Pain    Diarrhea         Review of Systems:    Review of Systems   Constitutional: Negative for activity change, chills and malaise/fatigue. HENT: Negative. Eyes: Negative. Negative for blurred vision. Respiratory: Negative. Yes       No Known Allergies    Current Outpatient Medications   Medication Sig Dispense Refill    omeprazole (PRILOSEC) 20 MG delayed release capsule TAKE 1 CAPSULE BY MOUTH EVERY DAY      ciprofloxacin (CIPRO) 500 MG tablet Take 1 tablet by mouth 2 times daily for 7 days 14 tablet 0    desvenlafaxine succinate (PRISTIQ) 100 MG TB24 extended release tablet Take 100 mg by mouth daily       acetaminophen (TYLENOL) 325 mg tablet Take 650 mg by mouth every 6 hours as needed for Pain      amLODIPine (NORVASC) 10 MG tablet Take 10 mg by mouth 2 times daily      aspirin 81 MG EC tablet Take by mouth daily      atorvastatin (LIPITOR) 80 MG tablet Take 80 mg by mouth daily      DULoxetine (CYMBALTA) 60 MG extended release capsule Take 60 mg by mouth 2 times daily      Evolocumab 140 MG/ML SOAJ Inject 140 mg into the skin      isosorbide mononitrate (IMDUR) 120 MG extended release tablet Take 120 mg by mouth 2 times daily      lisinopril (PRINIVIL;ZESTRIL) 20 MG tablet Take by mouth daily      metoprolol (LOPRESSOR) 100 MG tablet Take 100 mg by mouth 2 times daily      nitroGLYCERIN (NITROSTAT) 0.4 MG SL tablet Place under the tongue       omeprazole (PRILOSEC OTC) 20 MG tablet Take 20 mg by mouth daily      prasugrel (EFFIENT) 10 MG TABS Take 10 mg by mouth daily      ranolazine (RANEXA) 500 MG extended release tablet Take 500 mg by mouth       No current facility-administered medications for this visit. Vitals:    /60   Ht 5' 7.5\" (1.715 m)   Wt 219 lb (99.3 kg)   BMI 33.79 kg/m²     Physical Exam:  Physical Exam  Vitals and nursing note reviewed. Constitutional:       Appearance: Normal appearance. He is obese. He is not ill-appearing or diaphoretic. HENT:      Head: Normocephalic and atraumatic. Nose: Nose normal.   Eyes:      Pupils: Pupils are equal, round, and reactive to light. Cardiovascular:      Rate and Rhythm: Normal rate and regular rhythm. Pulses: Normal pulses.

## 2022-07-13 ENCOUNTER — TELEPHONE (OUTPATIENT)
Dept: ORTHOPEDIC SURGERY | Age: 57
End: 2022-07-13

## 2022-07-19 ENCOUNTER — TELEPHONE (OUTPATIENT)
Dept: ORTHOPEDIC SURGERY | Age: 57
End: 2022-07-19

## 2022-07-19 NOTE — TELEPHONE ENCOUNTER
Murl Kocher, NP is requesting patient to have Bilateral L4-S1 intra-articular, with possible radiofrequency ablations if he gets a positive effect from this injection

## 2022-07-21 ENCOUNTER — TELEPHONE (OUTPATIENT)
Dept: ORTHOPEDIC SURGERY | Age: 57
End: 2022-07-21

## 2022-07-21 DIAGNOSIS — M47.816 LUMBAR FACET ARTHROPATHY: Primary | ICD-10-CM

## 2022-07-21 DIAGNOSIS — M43.16 SPONDYLOLISTHESIS OF LUMBAR REGION: ICD-10-CM

## 2022-07-21 NOTE — TELEPHONE ENCOUNTER
Returned call to patient and explained to him that per the Cardiology [facility] they do not want patient to hold the Effient. I spoke with Dr. Olivier Safer and she can do the injection with him being on the blood thinner.

## 2022-07-27 ENCOUNTER — OFFICE VISIT (OUTPATIENT)
Dept: ORTHOPEDIC SURGERY | Age: 57
End: 2022-07-27
Payer: COMMERCIAL

## 2022-07-27 DIAGNOSIS — M47.816 LUMBAR FACET ARTHROPATHY: ICD-10-CM

## 2022-07-27 DIAGNOSIS — M43.16 SPONDYLOLISTHESIS OF LUMBAR REGION: Primary | ICD-10-CM

## 2022-07-27 DIAGNOSIS — M54.16 LUMBAR RADICULOPATHY: ICD-10-CM

## 2022-07-27 DIAGNOSIS — Z98.1 S/P LUMBAR FUSION: ICD-10-CM

## 2022-07-27 PROCEDURE — 64494 INJ PARAVERT F JNT L/S 2 LEV: CPT | Performed by: PHYSICAL MEDICINE & REHABILITATION

## 2022-07-27 PROCEDURE — 64493 INJ PARAVERT F JNT L/S 1 LEV: CPT | Performed by: PHYSICAL MEDICINE & REHABILITATION

## 2022-07-27 RX ORDER — TRIAMCINOLONE ACETONIDE 40 MG/ML
120 INJECTION, SUSPENSION INTRA-ARTICULAR; INTRAMUSCULAR ONCE
Status: COMPLETED | OUTPATIENT
Start: 2022-07-27 | End: 2022-07-27

## 2022-07-27 RX ADMIN — TRIAMCINOLONE ACETONIDE 120 MG: 40 INJECTION, SUSPENSION INTRA-ARTICULAR; INTRAMUSCULAR at 14:49

## 2022-07-27 NOTE — PROGRESS NOTES
Name: Josias Sung  YOB: 1965  Gender: male  MRN: [id number]    Procedure: Bilateral  L4-L5 and L5-S1 facet joint injections     Precautions: Redd Rodriguez deniesprior sensitivity to steroid, local anesthetic, iodine, or shellfish. The procedure was discussed at length with her and informed consent was signed and placed in the chart. She was placed in a prone position on the fluoroscopy table and the skin was prepped and draped in a routine sterile fashion. The areas to be injected were each anesthetized with 1% lidocaine. Next, a 25-gauge 3.5 inch spinal needle was carefully advanced under fluoroscopic guidance to the rightL4 - L5 facet joint. Aspiration was negative. Once proper placement was confirmed, 1 ml of 0.25% Marcaine and  30mg kenalog were injected through the spinal needle. The above procedure was then repeated through the rightL5 - S1, and left L4 - L5, and leftL5 - S1 facet joints. Fluoroscopic guidance was used intermittently over a 10-minute period to insure proper needle placement and his safety. A hard copy of the fluoroscopic images has been placed in his chart and is saved on the C-arm hard drive. He was monitored for 30 minutes after the procedure and discharged home in a stable fashion with a routine follow up. Procedural Diagnosis:     ICD-10-CM    1. Spondylolisthesis of lumbar region  M43.16 FL INJ LUMB/SAC FACET SINGLE LEVEL     XR INJ FACET LUMB SACRAL 2ND LVL     triamcinolone acetonide (KENALOG-40) injection 120 mg      2. Lumbar radiculopathy  M54.16 FL INJ LUMB/SAC FACET SINGLE LEVEL     XR INJ FACET LUMB SACRAL 2ND LVL     triamcinolone acetonide (KENALOG-40) injection 120 mg      3. S/P lumbar fusion  Z98.1 FL INJ LUMB/SAC FACET SINGLE LEVEL     XR INJ FACET LUMB SACRAL 2ND LVL     triamcinolone acetonide (KENALOG-40) injection 120 mg      4.  Lumbar facet arthropathy  M47.816 FL INJ LUMB/SAC FACET SINGLE LEVEL     XR INJ FACET LUMB SACRAL 2ND LVL     triamcinolone acetonide (KENALOG-40) injection 120 mg           Ankush Flynn MD  07/27/22

## 2022-08-01 ENCOUNTER — OFFICE VISIT (OUTPATIENT)
Dept: FAMILY MEDICINE CLINIC | Facility: CLINIC | Age: 57
End: 2022-08-01
Payer: COMMERCIAL

## 2022-08-01 VITALS
SYSTOLIC BLOOD PRESSURE: 110 MMHG | DIASTOLIC BLOOD PRESSURE: 62 MMHG | BODY MASS INDEX: 32.43 KG/M2 | HEIGHT: 68 IN | WEIGHT: 214 LBS

## 2022-08-01 DIAGNOSIS — G47.10 HYPERSOMNIA: ICD-10-CM

## 2022-08-01 DIAGNOSIS — I10 ESSENTIAL HYPERTENSION, BENIGN: ICD-10-CM

## 2022-08-01 DIAGNOSIS — E66.09 CLASS 1 OBESITY DUE TO EXCESS CALORIES WITHOUT SERIOUS COMORBIDITY WITH BODY MASS INDEX (BMI) OF 33.0 TO 33.9 IN ADULT: ICD-10-CM

## 2022-08-01 DIAGNOSIS — Z00.00 ROUTINE GENERAL MEDICAL EXAMINATION AT A HEALTH CARE FACILITY: Primary | ICD-10-CM

## 2022-08-01 DIAGNOSIS — G47.33 OSA (OBSTRUCTIVE SLEEP APNEA): ICD-10-CM

## 2022-08-01 DIAGNOSIS — I25.118 CORONARY ARTERY DISEASE OF NATIVE ARTERY OF NATIVE HEART WITH STABLE ANGINA PECTORIS (HCC): ICD-10-CM

## 2022-08-01 DIAGNOSIS — F32.4 MAJOR DEPRESSIVE DISORDER WITH SINGLE EPISODE, IN PARTIAL REMISSION (HCC): ICD-10-CM

## 2022-08-01 DIAGNOSIS — E78.00 PURE HYPERCHOLESTEROLEMIA: ICD-10-CM

## 2022-08-01 DIAGNOSIS — E66.9 OBESITY (BMI 30.0-34.9): ICD-10-CM

## 2022-08-01 LAB
ALBUMIN SERPL-MCNC: 4 G/DL (ref 3.5–5)
ALBUMIN/GLOB SERPL: 1.2 {RATIO} (ref 1.2–3.5)
ALP SERPL-CCNC: 43 U/L (ref 50–136)
ALT SERPL-CCNC: 20 U/L (ref 12–65)
ANION GAP SERPL CALC-SCNC: 2 MMOL/L (ref 7–16)
AST SERPL-CCNC: 22 U/L (ref 15–37)
BASOPHILS # BLD: 0.1 K/UL (ref 0–0.2)
BASOPHILS NFR BLD: 1 % (ref 0–2)
BILIRUB SERPL-MCNC: 0.3 MG/DL (ref 0.2–1.1)
BUN SERPL-MCNC: 25 MG/DL (ref 6–23)
CALCIUM SERPL-MCNC: 9.6 MG/DL (ref 8.3–10.4)
CHLORIDE SERPL-SCNC: 110 MMOL/L (ref 98–107)
CHOLEST SERPL-MCNC: 164 MG/DL
CO2 SERPL-SCNC: 27 MMOL/L (ref 21–32)
CREAT SERPL-MCNC: 1 MG/DL (ref 0.8–1.5)
DIFFERENTIAL METHOD BLD: ABNORMAL
EOSINOPHIL # BLD: 0.1 K/UL (ref 0–0.8)
EOSINOPHIL NFR BLD: 1 % (ref 0.5–7.8)
ERYTHROCYTE [DISTWIDTH] IN BLOOD BY AUTOMATED COUNT: 13.8 % (ref 11.9–14.6)
EST. AVERAGE GLUCOSE BLD GHB EST-MCNC: 100 MG/DL
GLOBULIN SER CALC-MCNC: 3.3 G/DL (ref 2.3–3.5)
GLUCOSE SERPL-MCNC: 84 MG/DL (ref 65–100)
HBA1C MFR BLD: 5.1 % (ref 4.8–5.6)
HCT VFR BLD AUTO: 43.8 % (ref 41.1–50.3)
HDLC SERPL-MCNC: 40 MG/DL (ref 40–60)
HDLC SERPL: 4.1 {RATIO}
HGB BLD-MCNC: 14.1 G/DL (ref 13.6–17.2)
IMM GRANULOCYTES # BLD AUTO: 0.1 K/UL (ref 0–0.5)
IMM GRANULOCYTES NFR BLD AUTO: 1 % (ref 0–5)
LDLC SERPL CALC-MCNC: 105.2 MG/DL
LYMPHOCYTES # BLD: 2.7 K/UL (ref 0.5–4.6)
LYMPHOCYTES NFR BLD: 23 % (ref 13–44)
MAGNESIUM SERPL-MCNC: 2.2 MG/DL (ref 1.8–2.4)
MCH RBC QN AUTO: 33 PG (ref 26.1–32.9)
MCHC RBC AUTO-ENTMCNC: 32.2 G/DL (ref 31.4–35)
MCV RBC AUTO: 102.6 FL (ref 79.6–97.8)
MONOCYTES # BLD: 1.1 K/UL (ref 0.1–1.3)
MONOCYTES NFR BLD: 10 % (ref 4–12)
NEUTS SEG # BLD: 7.5 K/UL (ref 1.7–8.2)
NEUTS SEG NFR BLD: 64 % (ref 43–78)
NRBC # BLD: 0 K/UL (ref 0–0.2)
PLATELET # BLD AUTO: 367 K/UL (ref 150–450)
PMV BLD AUTO: 10.2 FL (ref 9.4–12.3)
POTASSIUM SERPL-SCNC: 4.9 MMOL/L (ref 3.5–5.1)
PROT SERPL-MCNC: 7.3 G/DL (ref 6.3–8.2)
RBC # BLD AUTO: 4.27 M/UL (ref 4.23–5.6)
SODIUM SERPL-SCNC: 139 MMOL/L (ref 136–145)
TRIGL SERPL-MCNC: 94 MG/DL (ref 35–150)
TSH, 3RD GENERATION: 2.49 UIU/ML (ref 0.36–3.74)
VLDLC SERPL CALC-MCNC: 18.8 MG/DL (ref 6–23)
WBC # BLD AUTO: 11.6 K/UL (ref 4.3–11.1)

## 2022-08-01 PROCEDURE — 99396 PREV VISIT EST AGE 40-64: CPT | Performed by: FAMILY MEDICINE

## 2022-08-01 ASSESSMENT — ENCOUNTER SYMPTOMS
APNEA: 0
EYES NEGATIVE: 1
CHOKING: 0
ABDOMINAL PAIN: 0
CHEST TIGHTNESS: 1
SHORTNESS OF BREATH: 0
BLURRED VISION: 0

## 2022-08-01 ASSESSMENT — PATIENT HEALTH QUESTIONNAIRE - PHQ9
SUM OF ALL RESPONSES TO PHQ9 QUESTIONS 1 & 2: 0
2. FEELING DOWN, DEPRESSED OR HOPELESS: 0
SUM OF ALL RESPONSES TO PHQ QUESTIONS 1-9: 0
SUM OF ALL RESPONSES TO PHQ QUESTIONS 1-9: 0
1. LITTLE INTEREST OR PLEASURE IN DOING THINGS: 0
SUM OF ALL RESPONSES TO PHQ QUESTIONS 1-9: 0
SUM OF ALL RESPONSES TO PHQ QUESTIONS 1-9: 0

## 2022-08-01 NOTE — PROGRESS NOTES
[address] [place] [address]  _______________________________________  Iveth Timmons MD                 [address] [place] [address],  [address]. [place], [address] [place] [address]                     Sabrina Meier [address]                                                                                    Phone: [phone]                                                                                    Fax: [phone]    Bereket Mckinnon is a 60 y.o. male who is seen for evaluation of   Chief Complaint   Patient presents with    Hypertension    Cholesterol Problem    Gastroesophageal Reflux    Mental Health Problem       HPI:   Hypertension  This is a chronic problem. The current episode started more than 1 year ago. Progression since onset: Chronic hypertension controlled on medication without side effects or problems needs refills today. Associated symptoms include chest pain and malaise/fatigue. Pertinent negatives include no anxiety, blurred vision, headaches, neck pain, peripheral edema, PND or shortness of breath. Gastroesophageal Reflux  He complains of chest pain. He reports no abdominal pain or no choking. This is a chronic problem. Episode onset: Chronic acid reflux previously controlled on omeprazole 20 mg needs refills. Mental Health Problem  The current episode started this week. Degree of incapacity: Mild to moderate depression with anxiety needs refills of Pristiq 100 mg, no thoughts of self-harm. Additional symptoms of the illness do not include appetite change, headaches or abdominal pain. Heart Problem  This is a chronic problem. Episode onset: Chronic coronary disease currently managed by cardiology, he uses nitroglycerin a couple of nights per week as he lays down he gets some chest pain. Resolves completely and consistently with 1 sublingual nitroglycerin. His cardiologist is aware of this. Associated symptoms include chest pain.  Pertinent negatives include no abdominal pain, anorexia, arthralgias, chills, diaphoresis, headaches or neck pain. Other  Episode frequency: Patient also here for complete physical and needs routine annual screening labs, see details below. Associated symptoms include chest pain. Pertinent negatives include no abdominal pain, anorexia, arthralgias, chills, diaphoresis, headaches or neck pain. Chief Complaint   Patient presents with    Hypertension    Cholesterol Problem    Gastroesophageal Reflux    Mental Health Problem         Review of Systems:    Review of Systems   Constitutional:  Positive for malaise/fatigue. Negative for activity change, appetite change, chills and diaphoresis. HENT: Negative. Eyes: Negative. Negative for blurred vision. Respiratory:  Positive for chest tightness. Negative for apnea, choking and shortness of breath. Cardiovascular:  Positive for chest pain. Negative for PND. Gastrointestinal:  Negative for abdominal pain and anorexia. Endocrine: Negative. Genitourinary: Negative. Musculoskeletal:  Negative for arthralgias and neck pain. Neurological:  Negative for headaches.      History:  Past Medical History:   Diagnosis Date    Aneurysm [facility])     followed by family doctor - not large enough for vascular yet    CAD (coronary artery disease)     mi--2014--- cabg 2014--- followed by dr Nelson Gregorio; also had 2 stents post CABG    Chronic pain     lumbar    Depressive disorder, not elsewhere classified     on med    Essential hypertension, benign     controlled with med- affirms would be SOB w 1 flight of steps    GERD (gastroesophageal reflux disease)     controlled with med    Obese     bmi 35.81    Pure hypercholesterolemia     Sleep apnea     dx long ago-- per pt-- never had any follow up- wife says he no longer snores since CABG    Thyroid disease     managed with medications       Past Surgical History:   Procedure Laterality Date    BACK SURGERY      DR Marlena Myers, L3&4 August 2020   Sveta Chatman Dr Sola Ta, november 2018    COLONOSCOPY N/A 8/17/2018    COLONOSCOPY/ 32 performed by Karen Strickland [facility]OSCOPY    COLONOSCOPY FLX DX W/COLLJ SPEC WHEN PFRMD  8/17/2018         KNEE ARTHROSCOPY Right YRS AGO    LUMBAR LAMINECTOMY  08/10/2020    right L3-4 lami with lysis of adhesions Dr. Tony Yan  08/2018    [facility]    MD CARDIAC SURG PROCEDURE UNLIST  2014    CABG     MD CARDIAC SURG PROCEDURE UNLIST  2015    1 stent       Family History   Problem Relation Age of Onset    Diabetes Sister         type 2; insulin dep    Hypertension Father     Heart Disease Father         CABG, no MI     Cancer Mother         leukemia    Elevated Lipids Father        Social History     Tobacco Use    Smoking status: Former     Packs/day: 0.50     Types: Cigarettes    Smokeless tobacco: Never    Tobacco comments:     Quit smoking: quit Oct 2018   Substance Use Topics    Alcohol use: Yes       No Known Allergies    Current Outpatient Medications   Medication Sig Dispense Refill    omeprazole (PRILOSEC) 20 MG delayed release capsule TAKE 1 CAPSULE BY MOUTH EVERY DAY      desvenlafaxine succinate (PRISTIQ) 100 MG TB24 extended release tablet Take 100 mg by mouth daily       acetaminophen (TYLENOL) 325 mg tablet Take 650 mg by mouth every 6 hours as needed for Pain      amLODIPine (NORVASC) 10 MG tablet Take 10 mg by mouth 2 times daily      aspirin 81 MG EC tablet Take by mouth daily      atorvastatin (LIPITOR) 80 MG tablet Take 80 mg by mouth daily      DULoxetine (CYMBALTA) 60 MG extended release capsule Take 60 mg by mouth 2 times daily      Evolocumab 140 MG/ML SOAJ Inject 140 mg into the skin      isosorbide mononitrate (IMDUR) 120 MG extended release tablet Take 120 mg by mouth 2 times daily      lisinopril (PRINIVIL;ZESTRIL) 20 MG tablet Take by mouth daily      metoprolol (LOPRESSOR) 100 MG tablet Take 100 mg by mouth 2 times daily      nitroGLYCERIN (NITROSTAT) 0.4 MG SL tablet Place under the tongue       omeprazole (PRILOSEC OTC) 20 MG tablet Take 20 mg by mouth daily      prasugrel (EFFIENT) 10 MG TABS Take 10 mg by mouth daily      ranolazine (RANEXA) 500 MG extended release tablet Take 500 mg by mouth       No current facility-administered medications for this visit. Vitals:    /62   Ht 5' 7.5\" (1.715 m)   Wt 214 lb (97.1 kg)   BMI 33.02 kg/m²     Physical Exam:  Physical Exam  Vitals and nursing note reviewed. Constitutional:       Appearance: Normal appearance. He is obese. He is not ill-appearing or diaphoretic. HENT:      Head: Normocephalic and atraumatic. Nose: Nose normal.   Eyes:      Pupils: Pupils are equal, round, and reactive to light. Cardiovascular:      Rate and Rhythm: Normal rate and regular rhythm. Pulses: Normal pulses. Heart sounds: Normal heart sounds. Pulmonary:      Effort: Pulmonary effort is normal.      Breath sounds: Normal breath sounds. Musculoskeletal:         General: No swelling or tenderness. Normal range of motion. Cervical back: Normal range of motion and neck supple. Skin:     General: Skin is warm and dry. Findings: No erythema or rash. Neurological:      General: No focal deficit present. Mental Status: He is alert and oriented to person, place, and time. Mental status is at baseline. Psychiatric:         Mood and Affect: Mood normal.     Assessment/Plan:     ICD-10-CM    1. Routine general medical examination at a health care facility  Z00.00 Comprehensive Metabolic Panel     CBC with Auto Differential     Lipid Panel     Magnesium     PSA, Total and Free     TSH     Hemoglobin A1C     Hemoglobin A1C     TSH     PSA, Total and Free     Magnesium     Lipid Panel     CBC with Auto Differential     Comprehensive Metabolic Panel      2.  Coronary artery disease of native artery of native heart with stable angina pectoris ([facility] [address]  I25.118 Comprehensive Metabolic Panel CBC with Auto Differential     CBC with Auto Differential     Comprehensive Metabolic Panel      3. Major depressive disorder with single episode, in partial remission (HCC)  F32.4       4. BENITA (obstructive sleep apnea)  G47.33       5. Hypersomnia  G47.10       6. Obesity (BMI 30.0-34. 9)  E66.9       7. Class 1 obesity due to excess calories without serious comorbidity with body mass index (BMI) of 33.0 to 33.9 in adult  E66.09     Z68.33       8. Essential hypertension, benign  I10 Comprehensive Metabolic Panel     CBC with Auto Differential     CBC with Auto Differential     Comprehensive Metabolic Panel      9. Pure hypercholesterolemia  E78.00 Lipid Panel     Lipid Panel        Routine Care and counseling as appropriate for age and gender. Routine labs pending as needed based on age and gender. Avoid high risk behaviors encouraged. Encourage weight maintenance  Encourage healthy diet, exercise and lifestyle choices. Follow up as scheduled.       Merle Taylor MD

## 2022-08-02 LAB
PSA FREE MFR SERPL: NORMAL %
PSA FREE SERPL-MCNC: <0.1 NG/ML
PSA SERPL-MCNC: 0.2 NG/ML

## 2022-08-17 ENCOUNTER — OFFICE VISIT (OUTPATIENT)
Dept: ORTHOPEDIC SURGERY | Age: 57
End: 2022-08-17
Payer: COMMERCIAL

## 2022-08-17 DIAGNOSIS — M43.16 SPONDYLOLISTHESIS OF LUMBAR REGION: ICD-10-CM

## 2022-08-17 DIAGNOSIS — M47.816 LUMBAR FACET ARTHROPATHY: Primary | ICD-10-CM

## 2022-08-17 DIAGNOSIS — M54.16 LUMBAR RADICULOPATHY: ICD-10-CM

## 2022-08-17 PROCEDURE — 99213 OFFICE O/P EST LOW 20 MIN: CPT | Performed by: NURSE PRACTITIONER

## 2022-08-24 ENCOUNTER — CLINICAL DOCUMENTATION (OUTPATIENT)
Dept: SLEEP MEDICINE | Age: 57
End: 2022-08-24

## 2022-08-24 ENCOUNTER — TELEPHONE (OUTPATIENT)
Dept: ORTHOPEDIC SURGERY | Age: 57
End: 2022-08-24

## 2022-08-24 NOTE — PROGRESS NOTES
Received notice from [address] [place] [address] that pt has returned his cpap equipment AMA. \"pt stopped by to return C2C Resmed 10 cpap that was set up (07/05/2022). 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 \"

## 2022-08-25 ENCOUNTER — TELEPHONE (OUTPATIENT)
Dept: ORTHOPEDIC SURGERY | Age: 57
End: 2022-08-25

## 2022-08-25 NOTE — PROGRESS NOTES
Name: Jannet Allen  YOB: 1965  Gender: male  MRN: [id number]    CC: Follow-up back pain    HPI: This is a 59y.o. year old male who returns today after lumbar injections. Patient has chronic complaints of back pain. This is related to facet arthropathy and anterior listhesis of L4 and L5. Patient's had a prior left L3-4 lumbar fusion. He does have mild to moderate foraminal narrowing at the L4 level. No significant stenosis at L5-S1. Patient was referred for bilateral L4-S1 intra-articular facet injections. Patient states that he had approximately 2 to 3 days of relief. The relief was significant but then his pain returned back to baseline. AMB PAIN ASSESSMENT 6/29/2022   Location of Pain Back   Location Modifiers Right;Left   Frequency of Pain Constant   Limiting Behavior Yes   Result of Injury No   Work-Related Injury No   Are there other pain locations you wish to document?  No           No Known Allergies    Current Outpatient Medications:     omeprazole (PRILOSEC) 20 MG delayed release capsule, TAKE 1 CAPSULE BY MOUTH EVERY DAY, Disp: , Rfl:     desvenlafaxine succinate (PRISTIQ) 100 MG TB24 extended release tablet, Take 100 mg by mouth daily , Disp: , Rfl:     acetaminophen (TYLENOL) 325 mg tablet, Take 650 mg by mouth every 6 hours as needed for Pain, Disp: , Rfl:     amLODIPine (NORVASC) 10 MG tablet, Take 10 mg by mouth 2 times daily, Disp: , Rfl:     aspirin 81 MG EC tablet, Take by mouth daily, Disp: , Rfl:     atorvastatin (LIPITOR) 80 MG tablet, Take 80 mg by mouth daily, Disp: , Rfl:     DULoxetine (CYMBALTA) 60 MG extended release capsule, Take 60 mg by mouth 2 times daily, Disp: , Rfl:     Evolocumab 140 MG/ML SOAJ, Inject 140 mg into the skin, Disp: , Rfl:     isosorbide mononitrate (IMDUR) 120 MG extended release tablet, Take 120 mg by mouth 2 times daily, Disp: , Rfl:     lisinopril (PRINIVIL;ZESTRIL) 20 MG tablet, Take by mouth daily, Disp: , Rfl: metoprolol (LOPRESSOR) 100 MG tablet, Take 100 mg by mouth 2 times daily, Disp: , Rfl:     nitroGLYCERIN (NITROSTAT) 0.4 MG SL tablet, Place under the tongue , Disp: , Rfl:     omeprazole (PRILOSEC OTC) 20 MG tablet, Take 20 mg by mouth daily, Disp: , Rfl:     prasugrel (EFFIENT) 10 MG TABS, Take 10 mg by mouth daily, Disp: , Rfl:     ranolazine (RANEXA) 500 MG extended release tablet, Take 500 mg by mouth, Disp: , Rfl:   Past Medical History:   Diagnosis Date    Aneurysm ([facility] [address]     followed by family doctor - not large enough for vascular yet    CAD (coronary artery disease)     mi--2014--- cabg 2014--- followed by dr Gaye Samson; also had 2 stents post CABG    Chronic pain     lumbar    Depressive disorder, not elsewhere classified     on med    Essential hypertension, benign     controlled with med- affirms would be SOB w 1 flight of steps    GERD (gastroesophageal reflux disease)     controlled with med    Obese     bmi 35.81    Pure hypercholesterolemia     Sleep apnea     dx long ago-- per pt-- never had any follow up- wife says he no longer snores since CABG    Thyroid disease     managed with medications     Tobacco:  reports that he has quit smoking. He smoked an average of .5 packs per day. He has never used smokeless tobacco.  Alcohol:   Social History     Substance and Sexual Activity   Alcohol Use Yes          Radiographic Studies:   FINDINGS:   Prior L3-4 left transpedicular screw and wilda fixation. There is unchanged L3 on   L4 anterolisthesis. Modic type degenerative signal changes of the paired   endplates at Q1-Q3. No acute fracture evident. The intervertebral discs are   diffusely dehydrated with height loss at multiple levels. L1-2: Broad-based disc bulge and mild facet arthrosis without sniffing and   narrowing of the central spinal canal or neural foramina.    L2-3:     Broad-based disc bulge, ligamentum flavum hypertrophy, and facet   arthrosis resulting in mild spinal canal stenosis and moderate bilateral   neuroforaminal narrowing. L3-4:     Disc pseudobulge and facet arthrosis resulting in moderate right and   mild left neuroforaminal narrowing without significant spinal canal stenosis. L4-5:     Broad-based disc bulge, ligamentum flavum hypertrophy, and facet   arthrosis resulting in mild to moderate bilateral neuroforaminal narrowing and   mild spinal canal stenosis. L5-S1:  No significant spinal canal stenosis or neuroforaminal narrowing. Aneurysm of the infrarenal abdominal aorta measuring 3.0 cm AP. Impression   1. Prior left L3-4 posterior fixation without complication evident. 2.  Multilevel lumbar spondylosis and facet arthrosis without significant change   when compared to the 7/2020 exam.   3.  Aneurysm of the infrarenal abdominal aorta measuring up to 3.0 cm. Assessment/Plan:        ICD-10-CM    1. Lumbar facet arthropathy  M47.816       2. Spondylolisthesis of lumbar region  M43.16       3. Lumbar radiculopathy  M54.16            Patient had very short duration of relief but did have a positive effect of the facet injections. Therefore I am going to refer him to Dr. Gracie Gandhi for radiofrequency ablations and further treatment. We today discussed expected outcomes of RFA procedure.    -Referral to [facility] non-surgical spine provider for evaluation and treatment. No orders of the defined types were placed in this encounter. No orders of the defined types were placed in this encounter. 3 This is stable chronic illness/condition      Return for refer to [facility] for RFAs and E/T . MONO Yan - CNP  08/25/22      Elements of this note were created using speech recognition software. As such, errors of speech recognition may be present.

## 2022-08-25 NOTE — TELEPHONE ENCOUNTER
Patient was suppose to check out and make an apt with Dr. Tamiko Martinez .  I left brianne vm to call and make this appt

## 2022-09-26 NOTE — PROGRESS NOTES
[facility]  Consultation Note    Patient ID:  Name: Juana Ramirez  MRN: [id number]  AGE: 59 y.o.  : 1965    Date of Consultation:  2022    CC:   Chief Complaint   Patient presents with    Back Problem         HPI:  Mr. Jannet Crum is a 57-year-old man who presents today for evaluation of low back pain. Is a history of previous lumbar surgery about 4 years ago. He had discomfort after that surgery, the pain is getting worse with time. He underwent bilateral L4-5-S1 facet joint injections 2022, and these provided greater than 80% pain relief, lasting for 3 days. He saw improvement in his functioning as well. The pain has now returned to baseline. The pain affects his daily functioning. The pain is in the central and bilateral lower back. Sometimes, the pain radiates to the bilateral thighs, but the pain is much worse in the axial low back than in the thighs. There is no radiating pain below the knees. The pain awakens him at night. The pain is aggravated with lumbar flexion or activity. Symptoms are alleviated when he sits on the edge of the bed with his feet on the floor. He rates the pain as 8/10 in severity. He has perform physician guided home exercises daily since 2022 and continuing currently. These have helped minimally. MRI lumbar spine was ordered by another provider and performed 2021. I reviewed these images today. This showed left L3-4 instrumentation and fusion. He had continued L3-4 anterolisthesis. He had moderate right L3-4 neuroforaminal narrowing and mild to moderate bilateral neuroforaminal narrowing with mild central stenosis at L4-5. The radiologist also noted an aneurysm of the infrarenal abdominal aorta measuring 3.0 cm AP dimension. We discussed these findings at length today.      Past Medical History Includes:   Past Medical History:   Diagnosis Date    Aneurysm ([address]     followed by Take 1 capsule by mouth 4 times daily for 30 days. Intended supply: 90 days    omeprazole (PRILOSEC) 20 MG delayed release capsule TAKE 1 CAPSULE BY MOUTH EVERY DAY    desvenlafaxine succinate (PRISTIQ) 100 MG TB24 extended release tablet Take 100 mg by mouth daily     acetaminophen (TYLENOL) 325 mg tablet Take 650 mg by mouth every 6 hours as needed for Pain    amLODIPine (NORVASC) 10 MG tablet Take 10 mg by mouth 2 times daily    aspirin 81 MG EC tablet Take by mouth daily    atorvastatin (LIPITOR) 80 MG tablet Take 80 mg by mouth daily    DULoxetine (CYMBALTA) 60 MG extended release capsule Take 60 mg by mouth 2 times daily    Evolocumab 140 MG/ML SOAJ Inject 140 mg into the skin    isosorbide mononitrate (IMDUR) 120 MG extended release tablet Take 120 mg by mouth 2 times daily    lisinopril (PRINIVIL;ZESTRIL) 20 MG tablet Take by mouth daily    metoprolol (LOPRESSOR) 100 MG tablet Take 100 mg by mouth 2 times daily    nitroGLYCERIN (NITROSTAT) 0.4 MG SL tablet Place under the tongue     omeprazole (PRILOSEC OTC) 20 MG tablet Take 20 mg by mouth daily    prasugrel (EFFIENT) 10 MG TABS Take 10 mg by mouth daily    ranolazine (RANEXA) 500 MG extended release tablet Take 500 mg by mouth     No current facility-administered medications for this visit. ROS/Meds/PSH/PMH/FH/SH: I personally reviewed the patients standard intake form. Physical Exam:      Lumbar: PE: General: Awake, alert, no distress. HEENT: Mucous membranes moist and pink. Lawernce Roughen Psych: Appropriate and conversant. Derm: No rash Gait: Unassisted, non-ataxic MS: Straight leg raise negative bilaterally. No pain with hip internal or external rotation. No pain with resisted hip flexion bilaterally. No pain with lumbar flexion. He has low back discomfort with lumbar extension. He has ipsilateral low back pain with bilateral lumbar facet load. Mild tenderness palpation of the lower lumbar spine and paraspinals.  Strength is 5/5 and symmetric in the bilateral lower extremities. No foot drop. Neurological: Sensation to light touch is intact in the bilateral lower extremities. Reflexes are 1+ and symmetric in the bilateral lower extremities. VITALS: @IPVITALS(8:)@ , [id number]       No flowsheet data found. No results found for any visits on 09/28/22. Assessment and Plan:     ICD-10-CM    1. Lumbar facet arthropathy  M47.816 Injection Authorization - Spine     diazePAM (VALIUM) 10 MG tablet     gabapentin (NEURONTIN) 300 MG capsule      2. Spondylolisthesis of lumbar region  M43.16 Injection Authorization - Spine      3. Lumbar radiculopathy  M54.16 Injection Authorization - Spine          Orders Placed This Encounter   Procedures    Injection Authorization - Spine     Referral Priority:   Routine     Number of Visits Requested:   1        4   This is a chronic illness/condition with exacerbation and progression  Treatment at this time: Prescription Drug Management. For neuropathic pain relief, he was given a prescription for gabapentin and counseled regarding risks/complications from this medication. Discussed Injection therapy at length today, including risks, complications and alternative treatment options. The patient wishes to proceed. The injection will be performed as bilateral L4-5-S1 facet joint radiofrequency denervation. On the lumbar spine MRI that was ordered by another provider last year, the radiologist noted an AAA measuring up to 3 cm. The patient is not sure if he has had any further evaluation of that. He thinks that his primary care physician may evaluate for that yearly. He is a patient of [address] [place] [address] cardiology, under the care of Dr. Aashish Corona. With the patient's permission, I am going to contact both his primary care physician and his cardiologist to notify them of those findings on the September 2021 lumbar spine MRI.     Studies ordered today: none      Lorraine Dominguez MD  9/28/2022,  12:48 PM

## 2022-09-28 ENCOUNTER — OFFICE VISIT (OUTPATIENT)
Dept: ORTHOPEDIC SURGERY | Age: 57
End: 2022-09-28
Payer: COMMERCIAL

## 2022-09-28 DIAGNOSIS — M47.816 LUMBAR FACET ARTHROPATHY: Primary | ICD-10-CM

## 2022-09-28 DIAGNOSIS — M43.16 SPONDYLOLISTHESIS OF LUMBAR REGION: ICD-10-CM

## 2022-09-28 DIAGNOSIS — M54.16 LUMBAR RADICULOPATHY: ICD-10-CM

## 2022-09-28 PROCEDURE — 99214 OFFICE O/P EST MOD 30 MIN: CPT | Performed by: PHYSICAL MEDICINE & REHABILITATION

## 2022-09-28 RX ORDER — DIAZEPAM 10 MG/1
TABLET ORAL
Qty: 2 TABLET | Refills: 0 | Status: SHIPPED | OUTPATIENT
Start: 2022-09-28 | End: 2022-10-28

## 2022-09-28 RX ORDER — GABAPENTIN 300 MG/1
300 CAPSULE ORAL 4 TIMES DAILY
Qty: 120 CAPSULE | Refills: 5 | Status: SHIPPED | OUTPATIENT
Start: 2022-09-28 | End: 2022-10-28

## 2022-10-07 RX ORDER — DULOXETIN HYDROCHLORIDE 60 MG/1
60 CAPSULE, DELAYED RELEASE ORAL 2 TIMES DAILY
Qty: 30 CAPSULE | Refills: 3 | Status: SHIPPED | OUTPATIENT
Start: 2022-10-07

## 2022-10-17 ENCOUNTER — OFFICE VISIT (OUTPATIENT)
Dept: ORTHOPEDIC SURGERY | Age: 57
End: 2022-10-17
Payer: COMMERCIAL

## 2022-10-17 DIAGNOSIS — M43.16 SPONDYLOLISTHESIS OF LUMBAR REGION: ICD-10-CM

## 2022-10-17 DIAGNOSIS — M47.816 LUMBAR FACET ARTHROPATHY: Primary | ICD-10-CM

## 2022-10-17 PROCEDURE — 64635 DESTROY LUMB/SAC FACET JNT: CPT | Performed by: PHYSICAL MEDICINE & REHABILITATION

## 2022-10-17 PROCEDURE — 64636 DESTROY L/S FACET JNT ADDL: CPT | Performed by: PHYSICAL MEDICINE & REHABILITATION

## 2022-10-17 RX ORDER — TRIAMCINOLONE ACETONIDE 40 MG/ML
20 INJECTION, SUSPENSION INTRA-ARTICULAR; INTRAMUSCULAR ONCE
Status: COMPLETED | OUTPATIENT
Start: 2022-10-17 | End: 2022-10-17

## 2022-10-17 RX ADMIN — TRIAMCINOLONE ACETONIDE 20 MG: 40 INJECTION, SUSPENSION INTRA-ARTICULAR; INTRAMUSCULAR at 15:07

## 2022-10-17 NOTE — PROGRESS NOTES
Name: Tania Matamoros  YOB: 1965  Gender: male  MRN: [id number]    PROCEDURE: Left  L4, L5, and S1 medial branch nerve/facet joint radiofrequency denervation     Precautions:  Tania Matamoros denies prior sensitivity to steroid, local anesthetic, iodine, or shellfish. The procedure was discussed at length with the patient and informed consent was signed and placed in the chart. He had great relief of his usual low back pain from previous L4-L5 and L5-S1 bilateral  facet joint injections. The patient was placed in a prone position on the fluoroscopy table and the skin was prepped and draped in a routine sterile fashion with Hibaclens. The area where the medial branch nerve lies for Left L4 was identified under fluoroscopy, and the area to be injected was anesthetized with 1 mL of 1% Lidocaine. A 20 gauge 10 cm Neurotherm radiofrequency needle was carefully advanced under fluoroscopic guidance to area where the medial branch nerve for Left L4 lies. Bony contact was made. Testing for motor and sensory was negative into the lower extremities and groin to 3 volts. Motor onset was 0.7 volts. Aspiration was negative. An injectate of 1 mL of 0.25% marcaine was injected. Lesioning was carried out for 60 seconds at 80 degrees celsius. The probe was then rotated 180 degrees, and lesioning was repeated. The above procedure was repeated at the levels of the Left  L5 and left S1 medial branch nerves. At the level of Left  L5 medial branch nerve, motor onset was 0.5 volts. Testing for motor and sensory was negative into the lower extremities and groin to 3.0 volts at Left L5 medial branch nerve. At the level of Left S1 medial branch nerve, motor onset was 0.6 volts. Testing for motor and sensory was negative into the lower extremities and groin to 3.0 volts at Left  S1 medial branch nerve.     Following lesioning of all 3 nerves, an injectate of 10ml Marcaine 0.25% and 10 mg of kenalog was injected at each level. He tolerated the procedure well. There were no complications. He was stable and ambulatory at discharge. Fluoroscopic guidance was used intermittently over a 50-minute period to insure proper needle placement and patient safety. Procedural Diagnosis: @    ICD-10-CM    1. Lumbar facet arthropathy  M47.816 XR DESTR PARAVERTBRL LUMB/SAC W S&I     XR DESTR PARAVERTBRL LUMB/SAC ADD LV W S&I     triamcinolone acetonide (KENALOG-40) injection 20 mg      2.  Spondylolisthesis of lumbar region  M43.16 XR DESTR PARAVERTBRL LUMB/SAC W S&I     XR DESTR PARAVERTBRL LUMB/SAC ADD LV W S&I     triamcinolone acetonide (KENALOG-40) injection 20 mg          Kaiden Kerr MD

## 2022-10-20 ENCOUNTER — TELEPHONE (OUTPATIENT)
Dept: ORTHOPEDIC SURGERY | Age: 57
End: 2022-10-20

## 2022-10-20 ENCOUNTER — OFFICE VISIT (OUTPATIENT)
Dept: ORTHOPEDIC SURGERY | Age: 57
End: 2022-10-20
Payer: COMMERCIAL

## 2022-10-20 DIAGNOSIS — M47.816 LUMBAR FACET ARTHROPATHY: Primary | ICD-10-CM

## 2022-10-20 DIAGNOSIS — M54.16 LUMBAR RADICULOPATHY: ICD-10-CM

## 2022-10-20 DIAGNOSIS — M43.16 SPONDYLOLISTHESIS OF LUMBAR REGION: ICD-10-CM

## 2022-10-20 PROCEDURE — 64636 DESTROY L/S FACET JNT ADDL: CPT | Performed by: PHYSICAL MEDICINE & REHABILITATION

## 2022-10-20 PROCEDURE — 64635 DESTROY LUMB/SAC FACET JNT: CPT | Performed by: PHYSICAL MEDICINE & REHABILITATION

## 2022-10-20 RX ORDER — TRIAMCINOLONE ACETONIDE 40 MG/ML
20 INJECTION, SUSPENSION INTRA-ARTICULAR; INTRAMUSCULAR ONCE
Status: COMPLETED | OUTPATIENT
Start: 2022-10-20 | End: 2022-10-20

## 2022-10-20 RX ADMIN — TRIAMCINOLONE ACETONIDE 20 MG: 40 INJECTION, SUSPENSION INTRA-ARTICULAR; INTRAMUSCULAR at 13:52

## 2022-10-20 NOTE — TELEPHONE ENCOUNTER
Call was returned to patient and informed that after speaking to his pharmacy in regards to the prescription for Gabapentin. Patient's insurance insurance has a plan limit which is allowing him to fill 20 or 50 tablets at a time. He will be able to fill 50 tablets on November 2nd. He stated to understand.

## 2022-10-20 NOTE — PROGRESS NOTES
Name: Charlie Eugene  YOB: 1965  Gender: male  MRN: [id number]    PROCEDURE: Right  L4, L5, and S1 medial branch nerve/facet joint radiofrequency denervation     Precautions:  Charlie Eugene denies prior sensitivity to steroid, local anesthetic, iodine, or shellfish. The procedure was discussed at length with the patient and informed consent was signed and placed in the chart. He had great relief of his usual low back pain from previous L4-L5 and L5-S1 bilateral  facet joint injections. The patient was placed in a prone position on the fluoroscopy table and the skin was prepped and draped in a routine sterile fashion with Hibaclens. The area where the medial branch nerve lies for Right L4 was identified under fluoroscopy, and the area to be injected was anesthetized with 1 mL of 1% Lidocaine. A 20 gauge 10 cm Neurotherm radiofrequency needle was carefully advanced under fluoroscopic guidance to area where the medial branch nerve for Right L4 lies. Bony contact was made. Testing for motor and sensory was negative into the lower extremities and groin to 3 volts. Motor onset was 0.6 volts. Aspiration was negative. An injectate of 1 mL of 0.25% marcaine was injected. Lesioning was carried out for 60 seconds at 80 degrees celsius. The probe was then rotated 180 degrees, and lesioning was repeated. The above procedure was repeated at the levels of the Right  L5 and right S1 medial branch nerves. At the level of Right  L5 medial branch nerve, motor onset was 0.5 volts. Testing for motor and sensory was negative into the lower extremities and groin to 3.0 volts at Right L5 medial branch nerve. At the level of Right S1 medial branch nerve, motor onset was 0.6 volts. Testing for motor and sensory was negative into the lower extremities and groin to 3.0 volts at Right  S1 medial branch nerve.     Following lesioning of all 3 nerves, an injectate of 10ml Marcaine 0.25% and 10 mg of kenalog was injected at each level. He tolerated the procedure well. There were no complications. He was stable and ambulatory at discharge. Fluoroscopic guidance was used intermittently over a 50-minute period to insure proper needle placement and patient safety. Procedural Diagnosis: @    ICD-10-CM    1. Lumbar facet arthropathy  M47.816 XR DESTR PARAVERTBRL LUMB/SAC ADD LV W S&I     XR DESTR PARAVERTBRL LUMB/SAC W S&I     triamcinolone acetonide (KENALOG-40) injection 20 mg      2. Spondylolisthesis of lumbar region  M43.16 XR DESTR PARAVERTBRL LUMB/SAC ADD LV W S&I     XR DESTR PARAVERTBRL LUMB/SAC W S&I     triamcinolone acetonide (KENALOG-40) injection 20 mg      3.  Lumbar radiculopathy  M54.16 XR DESTR PARAVERTBRL LUMB/SAC ADD LV W S&I     XR DESTR PARAVERTBRL LUMB/SAC W S&I     triamcinolone acetonide (KENALOG-40) injection 20 mg          Radha Anguiano MD

## 2022-10-31 ENCOUNTER — TELEPHONE (OUTPATIENT)
Dept: ORTHOPEDIC SURGERY | Age: 57
End: 2022-10-31

## 2022-10-31 NOTE — TELEPHONE ENCOUNTER
He had some strange things happen to him on Friday while he was at work. His coworkers tried to convince him to go get checked but he didn't want to go to the ER. He did promise them he would call and discuss these issues with you. Please call.

## 2022-11-01 ENCOUNTER — TELEPHONE (OUTPATIENT)
Dept: ORTHOPEDIC SURGERY | Age: 57
End: 2022-11-01

## 2022-11-01 DIAGNOSIS — Z98.1 S/P LUMBAR FUSION: ICD-10-CM

## 2022-11-01 DIAGNOSIS — M54.16 LUMBAR RADICULOPATHY: ICD-10-CM

## 2022-11-01 DIAGNOSIS — M43.16 SPONDYLOLISTHESIS OF LUMBAR REGION: ICD-10-CM

## 2022-11-01 DIAGNOSIS — M47.816 LUMBAR FACET ARTHROPATHY: Primary | ICD-10-CM

## 2022-11-02 NOTE — TELEPHONE ENCOUNTER
Returned call to patient and he states that the day after his RFD he was at work and bent down to clean up his tools and he back locked and he started having increased pain. He states he was able start stretching which helped some. He report that today his feeling some better, but has weakness in his legs. I spoke with Dr. Rashawn Burris and patient can be ordered a new MRI and a possible course of Prednisone. Patient will call and get this cleared with his cardiologist before script will be sent to the pharmacy. I will also send a  message to appointment for them to call him in regards to scheduling the MRI. Patient stated to understand.

## 2022-12-01 ENCOUNTER — OFFICE VISIT (OUTPATIENT)
Dept: FAMILY MEDICINE CLINIC | Facility: CLINIC | Age: 57
End: 2022-12-01
Payer: COMMERCIAL

## 2022-12-01 VITALS — HEIGHT: 68 IN | BODY MASS INDEX: 33.02 KG/M2

## 2022-12-01 DIAGNOSIS — E83.42 HYPOMAGNESEMIA: ICD-10-CM

## 2022-12-01 DIAGNOSIS — R73.03 PREDIABETES: ICD-10-CM

## 2022-12-01 DIAGNOSIS — E66.9 OBESITY (BMI 30.0-34.9): ICD-10-CM

## 2022-12-01 DIAGNOSIS — F32.4 MAJOR DEPRESSIVE DISORDER WITH SINGLE EPISODE, IN PARTIAL REMISSION (HCC): ICD-10-CM

## 2022-12-01 DIAGNOSIS — R06.09 DYSPNEA ON EXERTION: Primary | ICD-10-CM

## 2022-12-01 DIAGNOSIS — G47.33 OSA (OBSTRUCTIVE SLEEP APNEA): ICD-10-CM

## 2022-12-01 DIAGNOSIS — I25.118 CORONARY ARTERY DISEASE OF NATIVE ARTERY OF NATIVE HEART WITH STABLE ANGINA PECTORIS (HCC): ICD-10-CM

## 2022-12-01 DIAGNOSIS — R53.83 FATIGUE, UNSPECIFIED TYPE: ICD-10-CM

## 2022-12-01 LAB
BASOPHILS # BLD: 0.1 K/UL (ref 0–0.2)
BASOPHILS NFR BLD: 1 % (ref 0–2)
DIFFERENTIAL METHOD BLD: ABNORMAL
EOSINOPHIL # BLD: 0.2 K/UL (ref 0–0.8)
EOSINOPHIL NFR BLD: 2 % (ref 0.5–7.8)
ERYTHROCYTE [DISTWIDTH] IN BLOOD BY AUTOMATED COUNT: 13.5 % (ref 11.9–14.6)
EST. AVERAGE GLUCOSE BLD GHB EST-MCNC: 105 MG/DL
HBA1C MFR BLD: 5.3 % (ref 4.8–5.6)
HCT VFR BLD AUTO: 40 % (ref 41.1–50.3)
HGB BLD-MCNC: 12.6 G/DL (ref 13.6–17.2)
IMM GRANULOCYTES # BLD AUTO: 0.1 K/UL (ref 0–0.5)
IMM GRANULOCYTES NFR BLD AUTO: 1 % (ref 0–5)
LYMPHOCYTES # BLD: 1.9 K/UL (ref 0.5–4.6)
LYMPHOCYTES NFR BLD: 26 % (ref 13–44)
MCH RBC QN AUTO: 31.9 PG (ref 26.1–32.9)
MCHC RBC AUTO-ENTMCNC: 31.5 G/DL (ref 31.4–35)
MCV RBC AUTO: 101.3 FL (ref 82–102)
MONOCYTES # BLD: 0.9 K/UL (ref 0.1–1.3)
MONOCYTES NFR BLD: 12 % (ref 4–12)
NEUTS SEG # BLD: 4.3 K/UL (ref 1.7–8.2)
NEUTS SEG NFR BLD: 58 % (ref 43–78)
NRBC # BLD: 0 K/UL (ref 0–0.2)
PLATELET # BLD AUTO: 290 K/UL (ref 150–450)
PMV BLD AUTO: 9.9 FL (ref 9.4–12.3)
RBC # BLD AUTO: 3.95 M/UL (ref 4.23–5.6)
WBC # BLD AUTO: 7.3 K/UL (ref 4.3–11.1)

## 2022-12-01 PROCEDURE — 99214 OFFICE O/P EST MOD 30 MIN: CPT | Performed by: FAMILY MEDICINE

## 2022-12-01 RX ORDER — LORAZEPAM 1 MG/1
TABLET ORAL
COMMUNITY
Start: 2022-11-16

## 2022-12-01 RX ORDER — TRAZODONE HYDROCHLORIDE 100 MG/1
TABLET ORAL
COMMUNITY
Start: 2022-09-07

## 2022-12-01 RX ORDER — GABAPENTIN 100 MG/1
CAPSULE ORAL
COMMUNITY
Start: 2022-09-07

## 2022-12-01 ASSESSMENT — PATIENT HEALTH QUESTIONNAIRE - PHQ9
1. LITTLE INTEREST OR PLEASURE IN DOING THINGS: 0
SUM OF ALL RESPONSES TO PHQ QUESTIONS 1-9: 0
SUM OF ALL RESPONSES TO PHQ QUESTIONS 1-9: 0
2. FEELING DOWN, DEPRESSED OR HOPELESS: 0
SUM OF ALL RESPONSES TO PHQ QUESTIONS 1-9: 0
SUM OF ALL RESPONSES TO PHQ QUESTIONS 1-9: 0
SUM OF ALL RESPONSES TO PHQ9 QUESTIONS 1 & 2: 0

## 2022-12-01 ASSESSMENT — ENCOUNTER SYMPTOMS
VOMITING: 0
NAUSEA: 0
RESPIRATORY NEGATIVE: 1
EYES NEGATIVE: 1
ABDOMINAL PAIN: 0
FACIAL SWELLING: 0
DIARRHEA: 1

## 2022-12-02 ENCOUNTER — TELEPHONE (OUTPATIENT)
Dept: ORTHOPEDIC SURGERY | Age: 57
End: 2022-12-02

## 2022-12-02 LAB
ALBUMIN SERPL-MCNC: 3.6 G/DL (ref 3.5–5)
ALBUMIN/GLOB SERPL: 1.2 {RATIO} (ref 0.4–1.6)
ALP SERPL-CCNC: 37 U/L (ref 50–136)
ALT SERPL-CCNC: 16 U/L (ref 12–65)
ANION GAP SERPL CALC-SCNC: 1 MMOL/L (ref 2–11)
AST SERPL-CCNC: 15 U/L (ref 15–37)
BILIRUB SERPL-MCNC: 0.2 MG/DL (ref 0.2–1.1)
BUN SERPL-MCNC: 16 MG/DL (ref 6–23)
CALCIUM SERPL-MCNC: 9.1 MG/DL (ref 8.3–10.4)
CHLORIDE SERPL-SCNC: 111 MMOL/L (ref 101–110)
CO2 SERPL-SCNC: 28 MMOL/L (ref 21–32)
CREAT SERPL-MCNC: 0.8 MG/DL (ref 0.8–1.5)
GLOBULIN SER CALC-MCNC: 3.1 G/DL (ref 2.8–4.5)
GLUCOSE SERPL-MCNC: 73 MG/DL (ref 65–100)
MAGNESIUM SERPL-MCNC: 2.4 MG/DL (ref 1.8–2.4)
NT PRO BNP: 531 PG/ML (ref 5–125)
POTASSIUM SERPL-SCNC: 4.4 MMOL/L (ref 3.5–5.1)
PROT SERPL-MCNC: 6.7 G/DL (ref 6.3–8.2)
SODIUM SERPL-SCNC: 140 MMOL/L (ref 133–143)
TSH, 3RD GENERATION: 3.4 UIU/ML (ref 0.36–3.74)

## 2022-12-02 NOTE — PROGRESS NOTES
[address] [place] [address]  _______________________________________  Ginny Mehta MD                 [address] [place] [address],  [address]. MD Stacey                     Sabrina Meier [address]                                                                                    Phone: [phone]                                                                                    Fax: [phone]    Hugo Madrid is a 59 y.o. male who is seen for evaluation of   Chief Complaint   Patient presents with    Edema     Feet and ankles    GI Problem       HPI:   Edema  This is a new problem. Episode frequency: last week or so with increased swelling and some SOB with BURK as well as abd pressure at times. Associated symptoms include fatigue. Pertinent negatives include no abdominal pain, chills, fever, nausea or vomiting. The symptoms are aggravated by coughing. He has tried nothing for the symptoms. GI Problem  The primary symptoms include fatigue and diarrhea. Primary symptoms do not include fever, weight loss, abdominal pain, nausea, vomiting or melena. Episode onset: intermittent diarrhea since the summer per his report. The illness does not include chills. Chief Complaint   Patient presents with    Edema     Feet and ankles    GI Problem         Review of Systems:    Review of Systems   Constitutional:  Positive for fatigue. Negative for activity change, appetite change, chills, fever and weight loss. HENT: Negative. Negative for drooling, ear pain and facial swelling. Eyes: Negative. Respiratory: Negative. Cardiovascular: Negative. Gastrointestinal:  Positive for diarrhea. Negative for abdominal pain, melena, nausea and vomiting. Endocrine: Negative. Genitourinary: Negative.       History:  Past Medical History:   Diagnosis Date    Aneurysm [facility])     followed by family doctor - not large enough for vascular yet    CAD (coronary artery disease)     mi--2014--- cabg 2014--- followed by dr Chika Santillan; also had 2 stents post CABG    Chronic pain     lumbar    Depressive disorder, not elsewhere classified     on med    Essential hypertension, benign     controlled with med- affirms would be SOB w 1 flight of steps    GERD (gastroesophageal reflux disease)     controlled with med    Obese     bmi 35.81    Pure hypercholesterolemia     Sleep apnea     dx long ago-- per pt-- never had any follow up- wife says he no longer snores since CABG    Thyroid disease     managed with medications       Past Surgical History:   Procedure Laterality Date   [facility] SURGERY      DR Marylen Diones, L3&4 August 2020   Jacky Garin Dr Marylen Diones, november 2018    COLONOSCOPY N/A 8/17/2018    COLONOSCOPY/ 32 performed by Ezekiel Erwin MD at [facility] ENDOSCOPY    COLONOSCOPY FLX DX W/COLLJ [address] [facility] [address] WHEN PFRMD  8/17/2018         KNEE ARTHROSCOPY Right YRS AGO    LUMBAR LAMINECTOMY  08/10/2020    right L3-4 lami with lysis of adhesions Dr. Sara Allison  08/2018    [facility]    MD CARDIAC SURG PROCEDURE UNLIST  2014    CABG     MD CARDIAC SURG PROCEDURE UNLIST  2015    1 stent       Family History   Problem Relation Age of Onset    Diabetes Sister         type 2; insulin dep    Hypertension Father     Heart Disease Father         CABG, no MI     Cancer Mother         leukemia    Elevated Lipids Father        Social History     Tobacco Use    Smoking status: Former     Packs/day: 0.50     Types: Cigarettes    Smokeless tobacco: Never    Tobacco comments:     Quit smoking: quit Oct 2018   Substance Use Topics    Alcohol use: Yes       No Known Allergies    Current Outpatient Medications   Medication Sig Dispense Refill    gabapentin (NEURONTIN) 100 MG capsule TAKE 1 CAPSULE BY MOUTH THREE TIMES A DAY      LORazepam (ATIVAN) 1 MG tablet TAKE 1 TABLET BY MOUTH TWICE A DAY      traZODone (DESYREL) 100 MG tablet TAKE 1 TABLET BY MOUTH EVERY DAY AT NIGHT      omeprazole (PRILOSEC) 20 MG delayed release capsule TAKE 1 CAPSULE BY MOUTH EVERY DAY      desvenlafaxine succinate (PRISTIQ) 100 MG TB24 extended release tablet Take 100 mg by mouth daily       acetaminophen (TYLENOL) 325 mg tablet Take 650 mg by mouth every 6 hours as needed for Pain      amLODIPine (NORVASC) 10 MG tablet Take 10 mg by mouth 2 times daily      aspirin 81 MG EC tablet Take by mouth daily      atorvastatin (LIPITOR) 80 MG tablet Take 80 mg by mouth daily      Evolocumab 140 MG/ML SOAJ Inject 140 mg into the skin      isosorbide mononitrate (IMDUR) 120 MG extended release tablet Take 120 mg by mouth 2 times daily      lisinopril (PRINIVIL;ZESTRIL) 20 MG tablet Take by mouth daily      metoprolol (LOPRESSOR) 100 MG tablet Take 100 mg by mouth 2 times daily      nitroGLYCERIN (NITROSTAT) 0.4 MG SL tablet Place under the tongue       omeprazole (PRILOSEC OTC) 20 MG tablet Take 20 mg by mouth daily      prasugrel (EFFIENT) 10 MG TABS Take 10 mg by mouth daily      ranolazine (RANEXA) 500 MG extended release tablet Take 500 mg by mouth       No current facility-administered medications for this visit. Vitals:    Ht 5' 7.5\" (1.715 m)   BMI 33.02 kg/m²     Physical Exam:  Physical Exam  Vitals and nursing note reviewed. Constitutional:       Appearance: Normal appearance. He is obese. He is not ill-appearing or diaphoretic. HENT:      Head: Normocephalic and atraumatic. Nose: Nose normal.   Eyes:      Pupils: Pupils are equal, round, and reactive to light. Cardiovascular:      Rate and Rhythm: Normal rate and regular rhythm. Pulses: Normal pulses. Heart sounds: Normal heart sounds. Pulmonary:      Effort: Pulmonary effort is normal.      Breath sounds: Normal breath sounds. Musculoskeletal:         General: No swelling or tenderness. Normal range of motion. Cervical back: Normal range of motion and neck supple. Right lower leg: Edema present.       Left lower leg: Edema present. Comments: Bilat edema to mid calf   Skin:     General: Skin is warm and dry. Findings: No erythema or rash. Neurological:      General: No focal deficit present. Mental Status: He is alert and oriented to person, place, and time. Mental status is at baseline. Psychiatric:         Mood and Affect: Mood normal.     Assessment/Plan:     ICD-10-CM    1. Dyspnea on exertion  R06.09 Brain Natriuretic Peptide     Brain Natriuretic Peptide      2. Fatigue, unspecified type  R53.83 CBC with Auto Differential     Comprehensive Metabolic Panel     TSH     TSH     Comprehensive Metabolic Panel     CBC with Auto Differential      3. Major depressive disorder with single episode, in partial remission ([facility] [address]  F32.4       4. Coronary artery disease of native artery of native heart with stable angina pectoris ([facility] [address]  I25.118       5. BENITA (obstructive sleep apnea)  G47.33       6. Obesity (BMI 30.0-34. 9)  E66.9       7. Prediabetes  R73.03 Hemoglobin A1C     Hemoglobin A1C      8. Hypomagnesemia  E83.42 Magnesium     Magnesium        Pt with increase swelling and mild increase SOB as well as high sugar and low magnesium. Needs recheck labs today  Consider diuretic depending on results of labs when back. Reduced salt in diet and increase exercise if tolerated.     BENITA: encourage regular use of his CPAP to prevent future health problems    Chitra Hamilton MD

## 2022-12-02 NOTE — TELEPHONE ENCOUNTER
Pt was supposed to have virtual visit today but it didn't happen. Next available is 12/3 and hes in a lot of pain. He want to speak to MA.

## 2022-12-05 ENCOUNTER — APPOINTMENT (OUTPATIENT)
Dept: GENERAL RADIOLOGY | Age: 57
DRG: 311 | End: 2022-12-05
Payer: COMMERCIAL

## 2022-12-05 ENCOUNTER — APPOINTMENT (OUTPATIENT)
Dept: ULTRASOUND IMAGING | Age: 57
DRG: 311 | End: 2022-12-05
Payer: COMMERCIAL

## 2022-12-05 ENCOUNTER — HOSPITAL ENCOUNTER (INPATIENT)
Age: 57
LOS: 2 days | Discharge: HOME | DRG: 311 | End: 2022-12-07
Attending: EMERGENCY MEDICINE | Admitting: INTERNAL MEDICINE
Payer: COMMERCIAL

## 2022-12-05 ENCOUNTER — APPOINTMENT (OUTPATIENT)
Dept: NON INVASIVE DIAGNOSTICS | Age: 57
DRG: 311 | End: 2022-12-05
Payer: COMMERCIAL

## 2022-12-05 ENCOUNTER — APPOINTMENT (OUTPATIENT)
Dept: CT IMAGING | Age: 57
DRG: 311 | End: 2022-12-05
Payer: COMMERCIAL

## 2022-12-05 DIAGNOSIS — R77.8 ELEVATED TROPONIN I LEVEL: ICD-10-CM

## 2022-12-05 DIAGNOSIS — R60.0 BILATERAL LEG EDEMA: Primary | ICD-10-CM

## 2022-12-05 DIAGNOSIS — R60.9 PERIPHERAL EDEMA: ICD-10-CM

## 2022-12-05 PROBLEM — R09.02 HYPOXIA: Status: ACTIVE | Noted: 2022-12-05

## 2022-12-05 LAB
ALBUMIN SERPL-MCNC: 3.3 G/DL (ref 3.5–5)
ALBUMIN/GLOB SERPL: 1 {RATIO} (ref 0.4–1.6)
ALP SERPL-CCNC: 35 U/L (ref 50–136)
ALT SERPL-CCNC: 12 U/L (ref 12–65)
ANION GAP SERPL CALC-SCNC: 2 MMOL/L (ref 2–11)
AST SERPL-CCNC: 19 U/L (ref 15–37)
BASOPHILS # BLD: 0.1 K/UL (ref 0–0.2)
BASOPHILS NFR BLD: 1 % (ref 0–2)
BILIRUB SERPL-MCNC: 0.2 MG/DL (ref 0.2–1.1)
BUN SERPL-MCNC: 20 MG/DL (ref 6–23)
CALCIUM SERPL-MCNC: 8.8 MG/DL (ref 8.3–10.4)
CHLORIDE SERPL-SCNC: 110 MMOL/L (ref 101–110)
CO2 SERPL-SCNC: 27 MMOL/L (ref 21–32)
CREAT SERPL-MCNC: 1 MG/DL (ref 0.8–1.5)
DIFFERENTIAL METHOD BLD: ABNORMAL
ECHO BSA: 2.22 M2
ECHO EST RA PRESSURE: 3 MMHG
EKG ATRIAL RATE: 66 BPM
EKG DIAGNOSIS: NORMAL
EKG P AXIS: 0 DEGREES
EKG P-R INTERVAL: 134 MS
EKG Q-T INTERVAL: 410 MS
EKG QRS DURATION: 122 MS
EKG QTC CALCULATION (BAZETT): 429 MS
EKG R AXIS: 57 DEGREES
EKG T AXIS: 56 DEGREES
EKG VENTRICULAR RATE: 66 BPM
EOSINOPHIL # BLD: 0.2 K/UL (ref 0–0.8)
EOSINOPHIL NFR BLD: 2 % (ref 0.5–7.8)
ERYTHROCYTE [DISTWIDTH] IN BLOOD BY AUTOMATED COUNT: 14 % (ref 11.9–14.6)
GLOBULIN SER CALC-MCNC: 3.4 G/DL (ref 2.8–4.5)
GLUCOSE SERPL-MCNC: 94 MG/DL (ref 65–100)
HCT VFR BLD AUTO: 37.3 % (ref 41.1–50.3)
HGB BLD-MCNC: 12.1 G/DL (ref 13.6–17.2)
IMM GRANULOCYTES # BLD AUTO: 0.1 K/UL (ref 0–0.5)
IMM GRANULOCYTES NFR BLD AUTO: 1 % (ref 0–5)
LYMPHOCYTES # BLD: 2.5 K/UL (ref 0.5–4.6)
LYMPHOCYTES NFR BLD: 26 % (ref 13–44)
MCH RBC QN AUTO: 32.7 PG (ref 26.1–32.9)
MCHC RBC AUTO-ENTMCNC: 32.4 G/DL (ref 31.4–35)
MCV RBC AUTO: 100.8 FL (ref 82–102)
MONOCYTES # BLD: 1.1 K/UL (ref 0.1–1.3)
MONOCYTES NFR BLD: 11 % (ref 4–12)
NEUTS SEG # BLD: 5.6 K/UL (ref 1.7–8.2)
NEUTS SEG NFR BLD: 59 % (ref 43–78)
NRBC # BLD: 0 K/UL (ref 0–0.2)
NT PRO BNP: 414 PG/ML (ref 5–125)
PLATELET # BLD AUTO: 288 K/UL (ref 150–450)
PMV BLD AUTO: 9.3 FL (ref 9.4–12.3)
POTASSIUM SERPL-SCNC: 4.3 MMOL/L (ref 3.5–5.1)
PROT SERPL-MCNC: 6.7 G/DL (ref 6.3–8.2)
RBC # BLD AUTO: 3.7 M/UL (ref 4.23–5.6)
SODIUM SERPL-SCNC: 139 MMOL/L (ref 133–143)
TROPONIN I SERPL HS-MCNC: 139 PG/ML (ref 0–14)
TROPONIN I SERPL HS-MCNC: 144.9 PG/ML (ref 0–14)
WBC # BLD AUTO: 9.4 K/UL (ref 4.3–11.1)

## 2022-12-05 PROCEDURE — 99285 EMERGENCY DEPT VISIT HI MDM: CPT

## 2022-12-05 PROCEDURE — 6370000000 HC RX 637 (ALT 250 FOR IP): Performed by: NURSE PRACTITIONER

## 2022-12-05 PROCEDURE — 99223 1ST HOSP IP/OBS HIGH 75: CPT | Performed by: INTERNAL MEDICINE

## 2022-12-05 PROCEDURE — 6360000004 HC RX CONTRAST MEDICATION: Performed by: INTERNAL MEDICINE

## 2022-12-05 PROCEDURE — 6360000002 HC RX W HCPCS: Performed by: NURSE PRACTITIONER

## 2022-12-05 PROCEDURE — 96374 THER/PROPH/DIAG INJ IV PUSH: CPT

## 2022-12-05 PROCEDURE — 2580000003 HC RX 258: Performed by: INTERNAL MEDICINE

## 2022-12-05 PROCEDURE — 71275 CT ANGIOGRAPHY CHEST: CPT

## 2022-12-05 PROCEDURE — 93005 ELECTROCARDIOGRAM TRACING: CPT | Performed by: PHYSICIAN ASSISTANT

## 2022-12-05 PROCEDURE — 2580000003 HC RX 258: Performed by: NURSE PRACTITIONER

## 2022-12-05 PROCEDURE — 96375 TX/PRO/DX INJ NEW DRUG ADDON: CPT

## 2022-12-05 PROCEDURE — 84484 ASSAY OF TROPONIN QUANT: CPT

## 2022-12-05 PROCEDURE — 85025 COMPLETE CBC W/AUTO DIFF WBC: CPT

## 2022-12-05 PROCEDURE — 80053 COMPREHEN METABOLIC PANEL: CPT

## 2022-12-05 PROCEDURE — A4216 STERILE WATER/SALINE, 10 ML: HCPCS | Performed by: INTERNAL MEDICINE

## 2022-12-05 PROCEDURE — 2580000003 HC RX 258: Performed by: STUDENT IN AN ORGANIZED HEALTH CARE EDUCATION/TRAINING PROGRAM

## 2022-12-05 PROCEDURE — 94762 N-INVAS EAR/PLS OXIMTRY CONT: CPT

## 2022-12-05 PROCEDURE — C8929 TTE W OR WO FOL WCON,DOPPLER: HCPCS

## 2022-12-05 PROCEDURE — 6360000002 HC RX W HCPCS: Performed by: STUDENT IN AN ORGANIZED HEALTH CARE EDUCATION/TRAINING PROGRAM

## 2022-12-05 PROCEDURE — 93971 EXTREMITY STUDY: CPT

## 2022-12-05 PROCEDURE — 71046 X-RAY EXAM CHEST 2 VIEWS: CPT

## 2022-12-05 PROCEDURE — 93306 TTE W/DOPPLER COMPLETE: CPT | Performed by: INTERNAL MEDICINE

## 2022-12-05 PROCEDURE — 6360000004 HC RX CONTRAST MEDICATION: Performed by: STUDENT IN AN ORGANIZED HEALTH CARE EDUCATION/TRAINING PROGRAM

## 2022-12-05 PROCEDURE — 83880 ASSAY OF NATRIURETIC PEPTIDE: CPT

## 2022-12-05 PROCEDURE — 1100000003 HC PRIVATE W/ TELEMETRY

## 2022-12-05 RX ORDER — LORAZEPAM 1 MG/1
1 TABLET ORAL 2 TIMES DAILY
Status: DISCONTINUED | OUTPATIENT
Start: 2022-12-05 | End: 2022-12-07 | Stop reason: HOSPADM

## 2022-12-05 RX ORDER — RANOLAZINE 500 MG/1
1000 TABLET, EXTENDED RELEASE ORAL 2 TIMES DAILY
Status: DISCONTINUED | OUTPATIENT
Start: 2022-12-05 | End: 2022-12-07 | Stop reason: HOSPADM

## 2022-12-05 RX ORDER — 0.9 % SODIUM CHLORIDE 0.9 %
100 INTRAVENOUS SOLUTION INTRAVENOUS
Status: COMPLETED | OUTPATIENT
Start: 2022-12-05 | End: 2022-12-05

## 2022-12-05 RX ORDER — ATORVASTATIN CALCIUM 80 MG/1
80 TABLET, FILM COATED ORAL DAILY
Status: DISCONTINUED | OUTPATIENT
Start: 2022-12-06 | End: 2022-12-07 | Stop reason: HOSPADM

## 2022-12-05 RX ORDER — ONDANSETRON 2 MG/ML
4 INJECTION INTRAMUSCULAR; INTRAVENOUS EVERY 6 HOURS PRN
Status: DISCONTINUED | OUTPATIENT
Start: 2022-12-05 | End: 2022-12-07 | Stop reason: HOSPADM

## 2022-12-05 RX ORDER — GABAPENTIN 100 MG/1
100 CAPSULE ORAL 3 TIMES DAILY
Status: DISCONTINUED | OUTPATIENT
Start: 2022-12-05 | End: 2022-12-05 | Stop reason: DRUGHIGH

## 2022-12-05 RX ORDER — PANTOPRAZOLE SODIUM 40 MG/1
40 TABLET, DELAYED RELEASE ORAL
Status: DISCONTINUED | OUTPATIENT
Start: 2022-12-06 | End: 2022-12-07 | Stop reason: HOSPADM

## 2022-12-05 RX ORDER — ONDANSETRON 2 MG/ML
4 INJECTION INTRAMUSCULAR; INTRAVENOUS
Status: COMPLETED | OUTPATIENT
Start: 2022-12-05 | End: 2022-12-05

## 2022-12-05 RX ORDER — ONDANSETRON 4 MG/1
4 TABLET, ORALLY DISINTEGRATING ORAL EVERY 8 HOURS PRN
Status: DISCONTINUED | OUTPATIENT
Start: 2022-12-05 | End: 2022-12-07 | Stop reason: HOSPADM

## 2022-12-05 RX ORDER — MORPHINE SULFATE 2 MG/ML
2 INJECTION, SOLUTION INTRAMUSCULAR; INTRAVENOUS EVERY 4 HOURS PRN
Status: DISCONTINUED | OUTPATIENT
Start: 2022-12-05 | End: 2022-12-07 | Stop reason: HOSPADM

## 2022-12-05 RX ORDER — FUROSEMIDE 10 MG/ML
40 INJECTION INTRAMUSCULAR; INTRAVENOUS ONCE
Status: COMPLETED | OUTPATIENT
Start: 2022-12-05 | End: 2022-12-05

## 2022-12-05 RX ORDER — NITROGLYCERIN 0.4 MG/1
0.4 TABLET SUBLINGUAL EVERY 5 MIN PRN
Status: DISCONTINUED | OUTPATIENT
Start: 2022-12-05 | End: 2022-12-07 | Stop reason: HOSPADM

## 2022-12-05 RX ORDER — SODIUM CHLORIDE 0.9 % (FLUSH) 0.9 %
5-40 SYRINGE (ML) INJECTION PRN
Status: DISCONTINUED | OUTPATIENT
Start: 2022-12-05 | End: 2022-12-07 | Stop reason: HOSPADM

## 2022-12-05 RX ORDER — PRASUGREL 10 MG/1
10 TABLET, FILM COATED ORAL DAILY
Status: DISCONTINUED | OUTPATIENT
Start: 2022-12-06 | End: 2022-12-07 | Stop reason: HOSPADM

## 2022-12-05 RX ORDER — SODIUM CHLORIDE 0.9 % (FLUSH) 0.9 %
5-40 SYRINGE (ML) INJECTION EVERY 12 HOURS SCHEDULED
Status: DISCONTINUED | OUTPATIENT
Start: 2022-12-05 | End: 2022-12-07 | Stop reason: HOSPADM

## 2022-12-05 RX ORDER — SODIUM CHLORIDE 0.9 % (FLUSH) 0.9 %
10 SYRINGE (ML) INJECTION
Status: COMPLETED | OUTPATIENT
Start: 2022-12-05 | End: 2022-12-05

## 2022-12-05 RX ORDER — ALBUTEROL SULFATE 2.5 MG/3ML
2.5 SOLUTION RESPIRATORY (INHALATION) EVERY 6 HOURS PRN
Status: DISCONTINUED | OUTPATIENT
Start: 2022-12-05 | End: 2022-12-07 | Stop reason: HOSPADM

## 2022-12-05 RX ORDER — POLYETHYLENE GLYCOL 3350 17 G/17G
17 POWDER, FOR SOLUTION ORAL DAILY PRN
Status: DISCONTINUED | OUTPATIENT
Start: 2022-12-05 | End: 2022-12-07 | Stop reason: HOSPADM

## 2022-12-05 RX ORDER — DESVENLAFAXINE 100 MG/1
100 TABLET, EXTENDED RELEASE ORAL DAILY
Status: DISCONTINUED | OUTPATIENT
Start: 2022-12-06 | End: 2022-12-07 | Stop reason: HOSPADM

## 2022-12-05 RX ORDER — POTASSIUM CHLORIDE 7.45 MG/ML
10 INJECTION INTRAVENOUS PRN
Status: DISCONTINUED | OUTPATIENT
Start: 2022-12-05 | End: 2022-12-07 | Stop reason: HOSPADM

## 2022-12-05 RX ORDER — LISINOPRIL 20 MG/1
20 TABLET ORAL DAILY
Status: DISCONTINUED | OUTPATIENT
Start: 2022-12-06 | End: 2022-12-07 | Stop reason: HOSPADM

## 2022-12-05 RX ORDER — ENOXAPARIN SODIUM 100 MG/ML
40 INJECTION SUBCUTANEOUS DAILY
Status: DISCONTINUED | OUTPATIENT
Start: 2022-12-05 | End: 2022-12-07 | Stop reason: HOSPADM

## 2022-12-05 RX ORDER — SODIUM CHLORIDE 9 MG/ML
INJECTION, SOLUTION INTRAVENOUS PRN
Status: DISCONTINUED | OUTPATIENT
Start: 2022-12-05 | End: 2022-12-07 | Stop reason: HOSPADM

## 2022-12-05 RX ORDER — MAGNESIUM SULFATE IN WATER 40 MG/ML
2000 INJECTION, SOLUTION INTRAVENOUS PRN
Status: DISCONTINUED | OUTPATIENT
Start: 2022-12-05 | End: 2022-12-07 | Stop reason: HOSPADM

## 2022-12-05 RX ORDER — HYDROMORPHONE HYDROCHLORIDE 1 MG/ML
1 INJECTION, SOLUTION INTRAMUSCULAR; INTRAVENOUS; SUBCUTANEOUS
Status: COMPLETED | OUTPATIENT
Start: 2022-12-05 | End: 2022-12-05

## 2022-12-05 RX ORDER — AMLODIPINE BESYLATE 10 MG/1
10 TABLET ORAL DAILY
Status: DISCONTINUED | OUTPATIENT
Start: 2022-12-06 | End: 2022-12-07 | Stop reason: HOSPADM

## 2022-12-05 RX ORDER — ACETAMINOPHEN 325 MG/1
650 TABLET ORAL
Status: COMPLETED | OUTPATIENT
Start: 2022-12-05 | End: 2022-12-05

## 2022-12-05 RX ORDER — ACETAMINOPHEN 325 MG/1
650 TABLET ORAL EVERY 6 HOURS PRN
Status: DISCONTINUED | OUTPATIENT
Start: 2022-12-05 | End: 2022-12-07 | Stop reason: HOSPADM

## 2022-12-05 RX ORDER — POTASSIUM CHLORIDE 20 MEQ/1
40 TABLET, EXTENDED RELEASE ORAL PRN
Status: DISCONTINUED | OUTPATIENT
Start: 2022-12-05 | End: 2022-12-07 | Stop reason: HOSPADM

## 2022-12-05 RX ORDER — ACETAMINOPHEN 325 MG/1
650 TABLET ORAL EVERY 6 HOURS PRN
Status: DISCONTINUED | OUTPATIENT
Start: 2022-12-05 | End: 2022-12-05 | Stop reason: SDUPTHER

## 2022-12-05 RX ORDER — ISOSORBIDE MONONITRATE 60 MG/1
120 TABLET, EXTENDED RELEASE ORAL DAILY
Status: DISCONTINUED | OUTPATIENT
Start: 2022-12-06 | End: 2022-12-07 | Stop reason: HOSPADM

## 2022-12-05 RX ORDER — GABAPENTIN 300 MG/1
300 CAPSULE ORAL 2 TIMES DAILY
Status: DISCONTINUED | OUTPATIENT
Start: 2022-12-05 | End: 2022-12-07 | Stop reason: HOSPADM

## 2022-12-05 RX ORDER — METOPROLOL TARTRATE 100 MG/1
100 TABLET ORAL 2 TIMES DAILY
Status: DISCONTINUED | OUTPATIENT
Start: 2022-12-05 | End: 2022-12-07 | Stop reason: HOSPADM

## 2022-12-05 RX ORDER — ASPIRIN 81 MG/1
81 TABLET ORAL DAILY
Status: DISCONTINUED | OUTPATIENT
Start: 2022-12-06 | End: 2022-12-07 | Stop reason: HOSPADM

## 2022-12-05 RX ORDER — ACETAMINOPHEN 650 MG/1
650 SUPPOSITORY RECTAL EVERY 6 HOURS PRN
Status: DISCONTINUED | OUTPATIENT
Start: 2022-12-05 | End: 2022-12-07 | Stop reason: HOSPADM

## 2022-12-05 RX ADMIN — HYDROMORPHONE HYDROCHLORIDE 1 MG: 1 INJECTION, SOLUTION INTRAMUSCULAR; INTRAVENOUS; SUBCUTANEOUS at 11:33

## 2022-12-05 RX ADMIN — LORAZEPAM 1 MG: 1 TABLET ORAL at 23:31

## 2022-12-05 RX ADMIN — METOPROLOL TARTRATE 100 MG: 100 TABLET, FILM COATED ORAL at 23:31

## 2022-12-05 RX ADMIN — FUROSEMIDE 40 MG: 10 INJECTION, SOLUTION INTRAMUSCULAR; INTRAVENOUS at 13:18

## 2022-12-05 RX ADMIN — PERFLUTREN 0.45 ML: 6.52 INJECTION, SUSPENSION INTRAVENOUS at 16:24

## 2022-12-05 RX ADMIN — SODIUM CHLORIDE, PRESERVATIVE FREE 10 ML: 5 INJECTION INTRAVENOUS at 23:35

## 2022-12-05 RX ADMIN — ACETAMINOPHEN 650 MG: 325 TABLET ORAL at 18:59

## 2022-12-05 RX ADMIN — ENOXAPARIN SODIUM 40 MG: 100 INJECTION SUBCUTANEOUS at 23:31

## 2022-12-05 RX ADMIN — RANOLAZINE 1000 MG: 500 TABLET, FILM COATED, EXTENDED RELEASE ORAL at 23:31

## 2022-12-05 RX ADMIN — ONDANSETRON 4 MG: 2 INJECTION INTRAMUSCULAR; INTRAVENOUS at 11:33

## 2022-12-05 RX ADMIN — SODIUM CHLORIDE, PRESERVATIVE FREE 10 ML: 5 INJECTION INTRAVENOUS at 11:18

## 2022-12-05 RX ADMIN — SODIUM CHLORIDE 100 ML: 9 INJECTION, SOLUTION INTRAVENOUS at 11:18

## 2022-12-05 RX ADMIN — GABAPENTIN 300 MG: 300 CAPSULE ORAL at 23:31

## 2022-12-05 RX ADMIN — IOPAMIDOL 100 ML: 755 INJECTION, SOLUTION INTRAVENOUS at 11:18

## 2022-12-05 RX ADMIN — MORPHINE SULFATE 2 MG: 2 INJECTION, SOLUTION INTRAMUSCULAR; INTRAVENOUS at 23:33

## 2022-12-05 ASSESSMENT — ENCOUNTER SYMPTOMS
COLOR CHANGE: 0
CONSTIPATION: 0
ABDOMINAL PAIN: 0
BACK PAIN: 1
SNORING: 1
CHEST TIGHTNESS: 0
COUGH: 0
WHEEZING: 1
WHEEZING: 0
HEARTBURN: 0
SHORTNESS OF BREATH: 1
ABDOMINAL DISTENTION: 0
VOMITING: 0
NAUSEA: 0
EYES NEGATIVE: 1

## 2022-12-05 ASSESSMENT — PAIN - FUNCTIONAL ASSESSMENT: PAIN_FUNCTIONAL_ASSESSMENT: 0-10

## 2022-12-05 ASSESSMENT — PAIN SCALES - GENERAL
PAINLEVEL_OUTOF10: 10
PAINLEVEL_OUTOF10: 0
PAINLEVEL_OUTOF10: 10
PAINLEVEL_OUTOF10: 1

## 2022-12-05 ASSESSMENT — PAIN DESCRIPTION - FREQUENCY: FREQUENCY: CONTINUOUS

## 2022-12-05 ASSESSMENT — PAIN DESCRIPTION - LOCATION
LOCATION: LEG;HIP
LOCATION: LEG
LOCATION: LEG

## 2022-12-05 ASSESSMENT — PAIN DESCRIPTION - DESCRIPTORS
DESCRIPTORS: ACHING;DISCOMFORT
DESCRIPTORS: ACHING

## 2022-12-05 ASSESSMENT — PAIN DESCRIPTION - PAIN TYPE: TYPE: ACUTE PAIN

## 2022-12-05 ASSESSMENT — PAIN DESCRIPTION - ORIENTATION
ORIENTATION: RIGHT
ORIENTATION: RIGHT

## 2022-12-05 NOTE — ED TRIAGE NOTES
Pt c/o R leg tenderness to touch, swelling since 11/25 seen by provider and prescribed \"water pills\" but they were never called in so pt unable to obtain medication.  (-)injury/trauma (+)dyspnea  Hx MI with bypass/stents  A&Ox4

## 2022-12-05 NOTE — H&P
Patient seen and examined by me. Agree with above note by physician extender. Key findings are: 70-year-old male with history of coronary bypass grafting and multiple percutaneous coronary inventions. He is traditionally followed by [facility] cardiology. He presents with 3 to 4 days of worsening lower extremity edema fatigue and weakness. Patient had right lower extremity pain and swelling with ultrasound demonstrated no evidence of DVT. CTA of chest demonstrates stable aneurysm no evidence of pulmonary embolus. EKG demonstrates no acute ST-T wave changes. High-sensitivity troponin is mildly elevated but trending down. Patient has minimally elevated proBNP at 400. Vitals:    12/05/22 1200 12/05/22 1230 12/05/22 1245 12/05/22 1300   BP: 133/60 (!) 143/67 134/72    Pulse: 65 63 63 69   Resp: 13 12 12 12   Temp:       TempSrc:       SpO2: 94% 96% 96% 98%   Weight:       Height:            General: Patient is alert and oriented, no apparent distress  HEENT: Pupils equal reactive, oropharynx clear  Chest: Diminished with wheeze bilaterally  Cardiovascular: S1-S2 regular with no murmur  Abdomen: Soft positive bowel sounds  Extremities: Soft no edema with intact distal pulses    Principal Problem:    Hypoxia  Plan: Patient is admitted with hypoxia. He appears to be somewhat disoriented. He reports a longstanding history of sleep apnea on noncompliance with CPAP. He has extensive tobacco abuse history and has never been seen by pulmonary but describes frequent episodes of dyspnea with abrupt wheezing. CTA demonstrates no evidence of pulmonary embolus. Patient does not appear to be markedly volume overloaded. We will obtain nocturnal oximetry to assess for nocturnal hypoxia as well as consult [facility] to assess patient for underlying COPD. Active Problems:    Bilateral leg edema  Plan: Patient appears to have bilateral venous stasis. Will administer 40 mg of IV Lasix.   Echocardiogram is pending. BENITA (obstructive sleep apnea)  Plan: Nocturnal oximetry    Hypersomnia  Plan: Patient reports history of being diagnosed with sleep apnea but noncompliant with CPAP. Essential hypertension, benign  Plan: Hemodynamic stable continue home medications    Obesity (BMI 30.0-34. 9)  Plan: Significant contributor to multiple medical problems above. Coronary artery disease-patient with longstanding CAD to include prior coronary bypass grafting and multiple percutaneous coronary inventions. All of patient's ischemic work-up has been completed at [facility] under the care of Dr. Paradise Gonzales. Pending clinical findings we will continue conservative care. If patient's LVEF is decreased patient develops symptoms were consistent with angina we will need to proceed with cardiac catheterization. Butch Valladares MD      [facility] Initial Cardiac Evaluation                Date of  Admission: 12/5/2022  9:45 AM     PCP: Ciaran Cortes MD  Requested by: ED  Primary Cardiologist: Dr Azra Suh Attending: Dr Hitesh Snell    Reason for Evaluation: MAY Underwood is a 59 y.o. male with hx of CAD s/p CABG, NSTEMI after stoping DAPT, HTN, AAA, chronic pain, BENITA, HLD, ED, cholecystitis, tobacco use, lumbar stenosis, and claudication. The patient has had two weeks of increase LLE with right side greater than left. This is painful which gets better with certain positions. He endorses increase abd edema with his paints fitting tighter but he can lay flat at night. He has been diagnosed with BENITA but does not see a pulmonologist or use a CPAPA. During the exam the patient was having a hard time staying awake with a BMP co2 of 27. Review of other labs show minimally elevated HS Trop T trending down, Cr 1.0, Albumin 3.3, Alk Phos 35, NT Pro BMP of 414 with previous 531. He has no complaints of CP, recent illness, change in medications, or other new medical problems.   His wife states he has had increase head aches and recent MD appointment stated he has lost 7 lbs compared to his last visit. He states he was and still is an active smoker. Recent Cardiac Synopsis  LHC/NST: [facility] 2020 1-ONLY REMAINING PATENT BYPASS GRAFTS ARE LIMA-LAD AND SV-DX/RAMUS   2-SEVERE DIFFUSE NATIVE VESSEL CAD BEYOND THE GRAFTS   3-MILD INFERIOR WALL HYPOKINESIS WITH NORMAL EF   4-NO CHANGE FROM CARDIAC CATH ONE YEAR AGO   Echo: 2020 · Technically difficult study due to patient's body habitus. · The left ventricular systolic function is normal (55-65%). · Grade II (moderate) left ventricular diastolic dysfunction present,   consistent with pseudonormalization. · The right ventricular systolic function is normal.   · The left atrium is mildly dilated. · No significant valvular abnormalities.     EKG: NSR with chronic ST Segment changes  PPM Interrogation:     Past Medical History:   Diagnosis Date    Aneurysm ([address]     followed by family doctor - not large enough for vascular yet    CAD (coronary artery disease)     mi--2014--- cabg 2014--- followed by dr Harley Monsalve; also had 2 stents post CABG    Chronic pain     lumbar    Depressive disorder, not elsewhere classified     on med    Essential hypertension, benign     controlled with med- affirms would be SOB w 1 flight of steps    GERD (gastroesophageal reflux disease)     controlled with med    Obese     bmi 35.81    Pure hypercholesterolemia     Sleep apnea     dx long ago-- per pt-- never had any follow up- wife says he no longer snores since CABG    Thyroid disease     managed with medications      Past Surgical History:   Procedure Laterality Date    BACK SURGERY      DR Fara Law, L3&4 August 2020    Katy Presume      Dr Fara Law, november 2018    COLONOSCOPY N/A 8/17/2018    COLONOSCOPY/ 32 performed by Matt Mccormick MD at [facility] ENDOSCOPY    COLONOSCOPY FLX DX W/COLLJ [facility] WHEN PFRMD  8/17/2018         KNEE ARTHROSCOPY Right YRS AGO    LUMBAR LAMINECTOMY  08/10/2020    right L3-4 lami with lysis of adhesions Dr. Christian Cardoza  08/2018    [facility]    [place] CARDIAC SURG PROCEDURE UNLIST  2014    CABG     [place] CARDIAC SURG PROCEDURE UNLIST  2015    1 stent     No Known Allergies   Family History   Problem Relation Age of Onset    Diabetes Sister         type 2; insulin dep    Hypertension Father     Heart Disease Father         CABG, no MI     Cancer Mother         leukemia    Elevated Lipids Father       Social History       Tobacco History       Smoking Status  Former Smoking Frequency  0.50 packs/day Smoking Tobacco Type  Cigarettes      Smokeless Tobacco Use  Never      Tobacco Comments  Quit smoking: quit Oct 2018              Alcohol History       Alcohol Use Status  Yes              Drug Use       Drug Use Status  No              Sexual Activity       Sexually Active  Not Asked                     Not in a hospital admission.      Current Facility-Administered Medications   Medication Dose Route Frequency    furosemide (LASIX) injection 40 mg  40 mg IntraVENous Once     Current Outpatient Medications   Medication Sig    gabapentin (NEURONTIN) 100 MG capsule TAKE 1 CAPSULE BY MOUTH THREE TIMES A DAY    LORazepam (ATIVAN) 1 MG tablet TAKE 1 TABLET BY MOUTH TWICE A DAY    traZODone (DESYREL) 100 MG tablet TAKE 1 TABLET BY MOUTH EVERY DAY AT NIGHT    omeprazole (PRILOSEC) 20 MG delayed release capsule TAKE 1 CAPSULE BY MOUTH EVERY DAY    desvenlafaxine succinate (PRISTIQ) 100 MG TB24 extended release tablet Take 100 mg by mouth daily     acetaminophen (TYLENOL) 325 mg tablet Take 650 mg by mouth every 6 hours as needed for Pain    amLODIPine (NORVASC) 10 MG tablet Take 10 mg by mouth 2 times daily    aspirin 81 MG EC tablet Take by mouth daily    atorvastatin (LIPITOR) 80 MG tablet Take 80 mg by mouth daily    Evolocumab 140 MG/ML SOAJ Inject 140 mg into the skin    isosorbide mononitrate (IMDUR) 120 MG extended release tablet Take 120 mg by mouth 2 times daily    lisinopril (PRINIVIL;ZESTRIL) 20 MG tablet Take by mouth daily    metoprolol (LOPRESSOR) 100 MG tablet Take 100 mg by mouth 2 times daily    nitroGLYCERIN (NITROSTAT) 0.4 MG SL tablet Place under the tongue     omeprazole (PRILOSEC OTC) 20 MG tablet Take 20 mg by mouth daily    prasugrel (EFFIENT) 10 MG TABS Take 10 mg by mouth daily    ranolazine (RANEXA) 500 MG extended release tablet Take 500 mg by mouth       Review of Systems   Constitutional: Positive for malaise/fatigue. Negative for chills, diaphoresis, fever, night sweats, weight gain and weight loss. HENT: Negative. Eyes: Negative. Cardiovascular:  Positive for leg swelling. Orthopnea: right greater than left. Respiratory:  Positive for snoring and wheezing. Endocrine: Negative. Hematologic/Lymphatic: Negative. Skin: Negative. Musculoskeletal: Negative. Gastrointestinal:  Negative for heartburn, nausea and vomiting. Genitourinary: Negative. Neurological:  Positive for excessive daytime sleepiness. Negative for dizziness and weakness. Psychiatric/Behavioral: Negative.         Physical Exam  Vitals:    12/05/22 1200 12/05/22 1230 12/05/22 1245 12/05/22 1300   BP: 133/60 (!) 143/67 134/72    Pulse: 65 63 63 69   Resp: 13 12 12 12   Temp:       TempSrc:       SpO2: 94% 96% 96% 98%   Weight:       Height:           Patient Vitals for the past 96 hrs (Last 3 readings):   Weight   12/05/22 0942 222 lb 6.4 oz (100.9 kg)       No intake or output data in the 24 hours ending 12/05/22 1313    Net IO Since Admission: No IO data has been entered for this period [12/05/22 1313]      Physical Exam:  General: Well Developed, Obese, No Acute Distress  HEENT: pupils equal and round, no abnormalities noted  Neck: supple, no JVD, no carotid bruits  Heart: S1S2 with RRR without murmurs or gallops  Lungs: Equal bilat with coarse lung sound heard all over, on 4 lpm via NC  Abd: Large none tender w/ distention, with good bowel sounds  Ext: warm, no edema, calves supple/nontender, pulses 2+ bilaterally  Skin: warm and dry  Psychiatric: Normal mood and affect  Neurologic: Oriented x3 with daytime sleepiness falling asleep while talking      Recent Results (from the past 24 hour(s))   EKG 12 Lead    Collection Time: 12/05/22  9:59 AM   Result Value Ref Range    Ventricular Rate 66 BPM    Atrial Rate 66 BPM    P-R Interval 134 ms    QRS Duration 122 ms    Q-T Interval 410 ms    QTc Calculation (Bazett) 429 ms    P Axis 0 degrees    R Axis 57 degrees    T Axis 56 degrees    Diagnosis Normal sinus rhythm    CBC with Auto Differential    Collection Time: 12/05/22 10:10 AM   Result Value Ref Range    WBC 9.4 4.3 - 11.1 K/uL    RBC 3.70 (L) 4.23 - 5.6 M/uL    Hemoglobin 12.1 (L) 13.6 - 17.2 g/dL    Hematocrit 37.3 (L) 41.1 - 50.3 %    .8 82 - 102 FL    MCH 32.7 26.1 - 32.9 PG    MCHC 32.4 31.4 - 35.0 g/dL    RDW 14.0 11.9 - 14.6 %    Platelets 271 500 - 123 K/uL    MPV 9.3 (L) 9.4 - 12.3 FL    nRBC 0.00 0.0 - 0.2 K/uL    Differential Type AUTOMATED      Seg Neutrophils 59 43 - 78 %    Lymphocytes 26 13 - 44 %    Monocytes 11 4.0 - 12.0 %    Eosinophils % 2 0.5 - 7.8 %    Basophils 1 0.0 - 2.0 %    Immature Granulocytes 1 0.0 - 5.0 %    Segs Absolute 5.6 1.7 - 8.2 K/UL    Absolute Lymph # 2.5 0.5 - 4.6 K/UL    Absolute Mono # 1.1 0.1 - 1.3 K/UL    Absolute Eos # 0.2 0.0 - 0.8 K/UL    Basophils Absolute 0.1 0.0 - 0.2 K/UL    Absolute Immature Granulocyte 0.1 0.0 - 0.5 K/UL   Comprehensive Metabolic Panel    Collection Time: 12/05/22 10:10 AM   Result Value Ref Range    Sodium 139 133 - 143 mmol/L    Potassium 4.3 3.5 - 5.1 mmol/L    Chloride 110 101 - 110 mmol/L    CO2 27 21 - 32 mmol/L    Anion Gap 2 2 - 11 mmol/L    Glucose 94 65 - 100 mg/dL    BUN 20 6 - 23 MG/DL    Creatinine 1.00 0.8 - 1.5 MG/DL    Est, Glom Filt Rate >60 >60 ml/min/1.73m2    Calcium 8.8 8.3 - 10.4 MG/DL    Total Bilirubin 0.2 0.2 - 1.1 MG/DL    ALT 12 12 - 65 U/L AST 19 15 - 37 U/L    Alk Phosphatase 35 (L) 50 - 136 U/L    Total Protein 6.7 6.3 - 8.2 g/dL    Albumin 3.3 (L) 3.5 - 5.0 g/dL    Globulin 3.4 2.8 - 4.5 g/dL    Albumin/Globulin Ratio 1.0 0.4 - 1.6     Troponin    Collection Time: 12/05/22 10:10 AM   Result Value Ref Range    Troponin, High Sensitivity 144.9 (HH) 0 - 14 pg/mL   Brain Natriuretic Peptide    Collection Time: 12/05/22 10:10 AM   Result Value Ref Range    NT Pro- (H) 5 - 125 PG/ML   Troponin    Collection Time: 12/05/22 11:59 AM   Result Value Ref Range    Troponin, High Sensitivity 139.0 (HH) 0 - 14 pg/mL        XR CHEST (2 VW)    Result Date: 12/5/2022  XR CHEST (2 VW) 12/5/2022 10:43 AM HISTORY: Right lower extremity swelling with history of myocardial infarction. Evaluate for edema. COMPARISON: None. AP and lateral views of the chest were obtained. The lungs are clear. Heart is normal in size status post CABG. No pneumothorax. No pleural effusions. MRI LUMBAR SPINE WO CONTRAST    Result Date: 11/7/2022  History: Low back and bilateral leg pain, 4 years duration. History of prior surgery in 2018 Exam: MRI lumbar spine without contrast Technique: Axial and sagittal T1 and T2-weighted sequences are available for review. A sagittal STIR sequence is also available for review. Comparison: 9/24/2021 Findings: There is posterior fusion hardware present at L3-4. Degenerative disc signal present L2-3 through L4-5 with interbody spacer. Subarticular reactive endplate change seen at L3-4. The conus is normal in configuration and signal intensity throughout. Axial imaging demonstrates no abnormal retroperitoneal lesions. There is persistent anterolisthesis L3 on L4. T11-12: No spinal stenosis or neural foraminal narrowing. L1-2: There is a disc bulge with bilateral facet arthropathy. There is also a right paracentral disc extrusion with inferior disc migration.  There is severe right lateral recess stenosis and moderate central stenosis at this level. This is new when compared with the prior exam. L2-3: There is a disc bulge with bilateral facet arthropathy. There is severe central stenosis, severe left and mild right neural foraminal narrowing. There has been significant interval progression when compared with the prior exam at this level. L3-L4: Postsurgical change present at this level with severe right neural foraminal narrowing. There has been some interval progression of disease. L4-L5: There is a disc bulge with bilateral facet arthropathy. There is mild central stenosis and mild bilateral neural foraminal narrowing, stable. L5-S1: There is facet arthropathy with mild bilateral neural foraminal narrowing, stable. Impression: 1. Posterior fusion hardware at L3-4. This is new when compared with the prior study. 2. Right paracentral disc extrusion with inferior disc migration at L1-2 resulting in severe right lateral recess stenosis and moderate central stenosis. This is new when compared with the prior exam. 3. At L2-3, there has been interval progression of disease, now with severe central stenosis, severe left and mild right neural foraminal narrowing. 4. Interval progression of right neural foraminal narrowing at L3-4. 5. Stable lumbar spondylosis at L4-5 and L5-S1. CTA CHEST ABDOMEN PELVIS W WO CONTRAST    Result Date: 12/5/2022  Title:  CTA  of the chest, abdomen and pelvis. Indication:  Chest and back pain. Technique: Axial images of the chest, abdomen and pelvis were obtained after the administration of intravenous iodinated contrast media. Contrast was used to best identify solid structures. Images also viewed on an independent workstation including 3D rendering. All CT scans at this facility are performed using dose reduction/dose modulation techniques, as appropriate the performed exam, including the following: Automated Exposure Control;  Adjustment of the mA and/or kV according to patient size (this includes techniques or standardized protocols for targeted exams where dose is matched to indication/reason for exam); and Use of Iterative Reconstruction Technique. Comparison: July 13, 2020 Findings: Neck base: Normal Lungs: Normal Mediastinum: Normal. Cardiac: Postoperative changes. Pulmonary Arteries:  Normal Esophagus: Normal CHEST WALL: Previous sternotomy Liver:Some reflux into the hepatic veins suggesting some right heart failure. Biliary:Previous cholecystectomy Pancreas:Duodenal diverticulum Spleen:Normal Adrenals:Normal Kidneys:Probable cortical cysts Lymph nodes:No pathologically enlarged lymph nodes Abdominal wall:Normal Bowel:Scattered mild colonic diverticulosis Pelvic organs:Prostate calcifications Bones:Degenerative and postoperative changes Aorta:  No evidence of aortic aneurysm or dissection. There is mild ectasia of the infrarenal aorta. No change. Extensive atherosclerotic changes are present. Vascular/Other:  Visualized vessels in the mesenteric vessels are patent. 1. No acute arterial process is identified. Stable mild ectasia of the infrarenal abdominal aorta. Vascular duplex lower extremity venous right    Result Date: 12/5/2022  Right lower extremity venous ultrasound INDICATION:  Pain and swelling, right lower extremity. Doppler ultrasound of the right lower extremity was performed. FINDINGS:  There is normal flow in the greater saphenous, common femoral, superficial femoral, and popliteal veins. Normal compression and augmentation is demonstrated. The proximal calf veins are also patent. No evidence of deep venous thrombosis in the right lower extremity           Initial Recommendations:      Cardiac Problems:    Hypoxia:  Given 40 mg IV lasix in the ED, has increased day time sleepiness with untreated BENITA, 80% Spo2 sat on room air now 94 on 4 lpm via NC. Pt states he can lay flat at home without problems. Will check ABG, echo, overnight oximetry, give 40 mg IV lasix BID, replaced electrolytes PRN. Further recommendations pending clinical course and attending recommendations. Consult pulmonology. Obesity: Has snoring, and daytime sleepiness. Hx of OAS. Likely needs pulmonary up in the sleep lab. Encourage healthy lifestyle choices. HTN:  Currently controlled continue to monitor. Bilateral low limb Edema: US of right leg negative for DVT, 40 mg IV lasix given, check echo, continue 40 mg IV lasix BID, Venous status vrs RHF with untreated sleep apnea? Further recommendations pending clinical course and attending recommendations. Demand ischemia: No CP, Check echo, EKG findings stable from previous EKG. Likely secondary to volume status and hypoxia. Thank you very much for requesting a Cardiology Evaluation. We appreciate the opportunity to participate in this patient's care. We will follow along with above stated plan. This is initial management plan but please see attendings consult note for full plan of care.     Nusrat Son, APRN - CNP AGACNP-BC

## 2022-12-05 NOTE — ED PROVIDER NOTES
Emergency Department Provider Note                   PCP:                Oscar Gonzáles MD               Age: 59 y.o. Sex: male       ICD-10-CM    1. Bilateral leg edema  R60.0 Transthoracic echocardiogram (TTE) complete with contrast, bubble, strain, and 3D PRN     Transthoracic echocardiogram (TTE) complete with contrast, bubble, strain, and 3D PRN          DISPOSITION Admitted 12/05/2022 02:22:10 PM        MDM  Number of Diagnoses or Management Options  Bilateral leg edema: new, needed workup  Elevated troponin I level: new, needed workup  Peripheral edema: new, needed workup  Diagnosis management comments: EKG is stable, initial troponin is significantly elevated however at 144. Patient denies active chest pain. Chest x-ray stable  Remainder of labs thus far unremarkable. Patient has undergone CTA imaging, awaiting result. No obvious dissection per my initial review. CTA shows no evidence of aortic issue aside from some chronic changes. Overall CT imaging is reassuring and shows no acute cause to explain patient's swelling and discomfort. Ultrasound imaging is negative. Patient does have history of low back issues and on recheck, displays positive straight leg raise on the affected side. However, I suspect patient might have underlying heart failure given his history of heart disease, exertional dyspnea and lower extremity edema. I will plan to repeat troponin and discuss further with cardiology. Cardiology agrees to evaluate patient.        Amount and/or Complexity of Data Reviewed  Clinical lab tests: ordered and reviewed  Tests in the radiology section of CPT®: ordered and reviewed  Tests in the medicine section of CPT®: reviewed and ordered  Discuss the patient with other providers: yes  Independent visualization of images, tracings, or specimens: yes    Risk of Complications, Morbidity, and/or Mortality  Presenting problems: high  Diagnostic procedures: high  Management options: high    Patient Progress  Patient progress: stable       ED Course as of 12/05/22 1537   Mon Dec 05, 2022   1010 EKG interpretation: Sinus rhythm, rate of 66, normal axis, right bundle branch block, no obvious ischemic change. [BR]   1011 Presents with slight hypotension, heart rate of 66, reports of chest pain, back pain and discomfort radiating to the left lower extremity. He has a history of abdominal aneurysm, given these vital signs and patient complaint I will obtain CTA imaging. Charge nurse notified of need for bed [BR]   1147 Patient reassessed after placement in an exam room. Reports ongoing discomfort in the right lower extremity. His blood pressure is much more stable at this time. Order placed for 1 mg of Dilaudid. Patient denies chest pain. Discussed lab results and plan for CTA of the chest abdomen pelvis. [BR]   1148 Right lower extremity ultrasound is negative for DVT. [BR]   1240 Received Dilaudid in this department. He appears very sedate and displayed brief hypoxia while sleeping. Head of bed elevated, placed on 2 L nasal cannula. [BR]   1245 Repeat troponin remains elevated though improved. We will plan to speak with cardiology regarding these findings.  [BR]   1342 Discussed case with cardiology who agrees to evaluate. [BR]      ED Course User Index  [BR] Marsa Phalen, DO        Orders Placed This Encounter   Procedures    XR CHEST (2 VW)    CTA CHEST ABDOMEN PELVIS W WO CONTRAST    CBC with Auto Differential    Comprehensive Metabolic Panel    Troponin    Brain Natriuretic Peptide    Troponin    RT--ABG WITH ELECTROLYTES    EKG 12 Lead    ADMIT TO INPATIENT    Vascular duplex lower extremity venous right        Medications   albuterol (PROVENTIL) nebulizer solution 2.5 mg (has no administration in time range)   0.9 % sodium chloride bolus (0 mLs IntraVENous Stopped 12/5/22 1149)   sodium chloride flush 0.9 % injection 10 mL (10 mLs IntraVENous Given 12/5/22 1118)   iopamidol (ISOVUE-370) 76 % injection 100 mL (100 mLs IntraVENous Given 12/5/22 1118)   HYDROmorphone HCl PF (DILAUDID) injection 1 mg (1 mg IntraVENous Given 12/5/22 1133)   ondansetron (ZOFRAN) injection 4 mg (4 mg IntraVENous Given 12/5/22 1133)   furosemide (LASIX) injection 40 mg (40 mg IntraVENous Given 12/5/22 1318)       New Prescriptions    No medications on file        Fernanda Gallagher is a 59 y.o. male who presents to the Emergency Department with chief complaint of    Chief Complaint   Patient presents with    Leg Swelling    Shortness of Breath      Patient initially seen by midlevel provider in triage as part of RME process. This is a 68-year-old male patient with history of heart disease presenting to this department with reports of right lower extremity pain and swelling. Patient states symptoms have been present for the past several days. He denies falls or trauma. He reports consult with his primary care provider who called in some Lasix but secondary to a mixup at the pharmacy, patient was unable to obtain this medication. He also reports exertional dyspnea as well as occasional chest discomfort and back pain. The symptoms have been intermittent nature for several days. He denies obvious alleviating or exacerbating factors. There is no associated cough, congestion, fever or chills patient does not report any diaphoresis. He notes some discomfort in the low back radiating across the flanks bilaterally. Patient reports history of aortic aneurysm in the abdomen which is being monitored. History includes previous CABG. The history is provided by the patient and the spouse. No  was used. Review of Systems   Constitutional:  Negative for chills and fatigue. HENT:  Negative for congestion. Eyes:  Negative for visual disturbance. Respiratory:  Positive for shortness of breath. Negative for cough, chest tightness and wheezing.     Cardiovascular:  Positive for chest pain and leg swelling. Gastrointestinal:  Negative for abdominal distention, abdominal pain, constipation, nausea and vomiting. Genitourinary:  Negative for difficulty urinating, dysuria, flank pain and hematuria. Musculoskeletal:  Positive for back pain. Negative for neck pain and neck stiffness. Skin:  Negative for color change. Neurological:  Negative for dizziness, light-headedness, numbness and headaches. All other systems reviewed and are negative.     Past Medical History:   Diagnosis Date    Aneurysm ([address]     followed by family doctor - not large enough for vascular yet    CAD (coronary artery disease)     mi--2014--- cabg 2014--- followed by dr Nicolas Saleh; also had 2 stents post CABG    Chronic pain     lumbar    Depressive disorder, not elsewhere classified     on med    Essential hypertension, benign     controlled with med- affirms would be SOB w 1 flight of steps    GERD (gastroesophageal reflux disease)     controlled with med    Obese     bmi 35.81    Pure hypercholesterolemia     Sleep apnea     dx long ago-- per pt-- never had any follow up- wife says he no longer snores since CABG    Thyroid disease     managed with medications        Past Surgical History:   Procedure Laterality Date    BACK SURGERY      DR Ela Christine, L3&4 August 2020    Rupert Christine, november 2018    COLONOSCOPY N/A 8/17/2018    COLONOSCOPY/ 32 performed by Sobia Lopes MD at [facility] ENDOSCOPY    COLONOSCOPY FLX DX W/COLLJ SPEC WHEN PFRMD  8/17/2018         KNEE ARTHROSCOPY Right YRS AGO    LUMBAR LAMINECTOMY  08/10/2020    right L3-4 lami with lysis of adhesions Dr. Ingram [facility]  08/2018    [facility]    [place] CARDIAC SURG PROCEDURE UNLIST  2014    CABG     [place] CARDI[place] SUR[place] PROCEDURE UNLIST  2015    1 stent        Family History   Problem Relation Age of Onset    Diabetes Sister         type 2; insulin dep    Hypertension Father     Heart Disease Father         CABG, no MI     Cancer Mother leukemia    Elevated Lipids Father         Social History     Socioeconomic History    Marital status:      Spouse name: None    Number of children: None    Years of education: None    Highest education level: None   Tobacco Use    Smoking status: Former     Packs/day: 0.50     Types: Cigarettes    Smokeless tobacco: Never    Tobacco comments:     Quit smoking: quit Oct 2018   Substance and Sexual Activity    Alcohol use: Yes    Drug use: No         Patient has no known allergies. Previous Medications    ACETAMINOPHEN (TYLENOL) 325 MG TABLET    Take 650 mg by mouth every 6 hours as needed for Pain    AMLODIPINE (NORVASC) 10 MG TABLET    Take 10 mg by mouth 2 times daily    ASPIRIN 81 MG EC TABLET    Take by mouth daily    ATORVASTATIN (LIPITOR) 80 MG TABLET    Take 80 mg by mouth daily    DESVENLAFAXINE SUCCINATE (PRISTIQ) 100 MG TB24 EXTENDED RELEASE TABLET    Take 100 mg by mouth daily     EVOLOCUMAB 140 MG/ML SOAJ    Inject 140 mg into the skin    GABAPENTIN (NEURONTIN) 100 MG CAPSULE    TAKE 1 CAPSULE BY MOUTH THREE TIMES A DAY    ISOSORBIDE MONONITRATE (IMDUR) 120 MG EXTENDED RELEASE TABLET    Take 120 mg by mouth 2 times daily    LISINOPRIL (PRINIVIL;ZESTRIL) 20 MG TABLET    Take by mouth daily    LORAZEPAM (ATIVAN) 1 MG TABLET    TAKE 1 TABLET BY MOUTH TWICE A DAY    METOPROLOL (LOPRESSOR) 100 MG TABLET    Take 100 mg by mouth 2 times daily    NITROGLYCERIN (NITROSTAT) 0.4 MG SL TABLET    Place under the tongue     OMEPRAZOLE (PRILOSEC OTC) 20 MG TABLET    Take 20 mg by mouth daily    OMEPRAZOLE (PRILOSEC) 20 MG DELAYED RELEASE CAPSULE    TAKE 1 CAPSULE BY MOUTH EVERY DAY    PRASUGREL (EFFIENT) 10 MG TABS    Take 10 mg by mouth daily    RANOLAZINE (RANEXA) 500 MG EXTENDED RELEASE TABLET    Take 500 mg by mouth    TRAZODONE (DESYREL) 100 MG TABLET    TAKE 1 TABLET BY MOUTH EVERY DAY AT NIGHT        Vitals signs and nursing note reviewed.    Patient Vitals for the past 4 hrs:   Pulse Resp BP SpO2   12/05/22 1300 69 12 -- 98 %   12/05/22 1245 63 12 134/72 96 %   12/05/22 1230 63 12 (!) 143/67 96 %   12/05/22 1200 65 13 133/60 94 %   12/05/22 1145 -- -- 128/65 (!) 89 %          Physical Exam  Vitals and nursing note reviewed. Constitutional:       General: He is not in acute distress. Appearance: Normal appearance. He is normal weight. He is not ill-appearing or toxic-appearing. Comments: Generally well-appearing, alert and oriented x4. No acute distress, speaks in clear, fluid sentences. HENT:      Head: Normocephalic and atraumatic. Right Ear: External ear normal.      Left Ear: External ear normal.      Nose: Nose normal.      Mouth/Throat:      Mouth: Mucous membranes are moist.   Eyes:      General: No scleral icterus. Right eye: No discharge. Left eye: No discharge. Extraocular Movements: Extraocular movements intact. Cardiovascular:      Rate and Rhythm: Normal rate and regular rhythm. Pulses: Normal pulses. Heart sounds: Normal heart sounds. Pulmonary:      Effort: Pulmonary effort is normal. No tachypnea, bradypnea, accessory muscle usage, prolonged expiration or respiratory distress. Breath sounds: Normal breath sounds and air entry. No stridor. No decreased breath sounds, wheezing, rhonchi or rales. Comments: No focal findings. No evidence of respiratory difficulty or distress. Abdominal:      General: Abdomen is flat. There is no distension. Palpations: There is no mass. Tenderness: There is no abdominal tenderness. There is no right CVA tenderness, left CVA tenderness, guarding or rebound. Negative signs include Ricardo's sign and McBurney's sign. Hernia: No hernia is present. Comments: Unremarkable on exam.  No rebound guarding or distention. Musculoskeletal:         General: No swelling, tenderness or deformity. Normal range of motion. Cervical back: Normal range of motion.       Comments: Lower extremities are swollen, much more pronounced over the lower right as opposed to the left. 2+ pitting edema. Palpable pulses in the distal aspect of each extremity. Skin:     General: Skin is warm. Capillary Refill: Capillary refill takes less than 2 seconds. Neurological:      General: No focal deficit present. Mental Status: He is alert. Psychiatric:         Mood and Affect: Mood normal.        Procedures    Results for orders placed or performed during the hospital encounter of 12/05/22   XR CHEST (2 VW)    Narrative    XR CHEST (2 VW) 12/5/2022 10:43 AM     HISTORY: Right lower extremity swelling with history of myocardial infarction. Evaluate for edema. COMPARISON: None. AP and lateral views of the chest were obtained. Impression    The lungs are clear. Heart is normal in size status post CABG. No  pneumothorax. No pleural effusions. CTA CHEST ABDOMEN PELVIS W WO CONTRAST    Narrative    Title:  CTA  of the chest, abdomen and pelvis. Indication:  Chest and back pain. Technique: Axial images of the chest, abdomen and pelvis were obtained after the  administration of intravenous iodinated contrast media. Contrast was used to  best identify solid structures. Images also viewed on an independent  workstation including 3D rendering. All CT scans at this facility are performed using dose reduction/dose modulation  techniques, as appropriate the performed exam, including the following:   Automated Exposure Control; Adjustment of the mA and/or kV according to patient  size (this includes techniques or standardized protocols for targeted exams  where dose is matched to indication/reason for exam); and Use of Iterative  Reconstruction Technique. Comparison: July 13, 2020    Findings:     Neck base: Normal   Lungs: Normal  Mediastinum: Normal.  Cardiac: Postoperative changes.   Pulmonary Arteries:  Normal  Esophagus: Normal  CHEST WALL: Previous sternotomy  Liver:Some reflux into the hepatic veins suggesting some right heart failure. Biliary:Previous cholecystectomy  Pancreas:Duodenal diverticulum  Spleen:Normal  Adrenals:Normal  Kidneys:Probable cortical cysts  Lymph nodes:No pathologically enlarged lymph nodes  Abdominal wall:Normal  Bowel:Scattered mild colonic diverticulosis  Pelvic organs:Prostate calcifications  Bones:Degenerative and postoperative changes    Aorta:  No evidence of aortic aneurysm or dissection. There is mild ectasia of  the infrarenal aorta. No change. Extensive atherosclerotic changes are present. Vascular/Other:  Visualized vessels in the mesenteric vessels are patent. Impression    1. No acute arterial process is identified. Stable mild ectasia of the  infrarenal abdominal aorta.    CBC with Auto Differential   Result Value Ref Range    WBC 9.4 4.3 - 11.1 K/uL    RBC 3.70 (L) 4.23 - 5.6 M/uL    Hemoglobin 12.1 (L) 13.6 - 17.2 g/dL    Hematocrit 37.3 (L) 41.1 - 50.3 %    .8 82 - 102 FL    MCH 32.7 26.1 - 32.9 PG    MCHC 32.4 31.4 - 35.0 g/dL    RDW 14.0 11.9 - 14.6 %    Platelets 809 363 - 347 K/uL    MPV 9.3 (L) 9.4 - 12.3 FL    nRBC 0.00 0.0 - 0.2 K/uL    Differential Type AUTOMATED      Seg Neutrophils 59 43 - 78 %    Lymphocytes 26 13 - 44 %    Monocytes 11 4.0 - 12.0 %    Eosinophils % 2 0.5 - 7.8 %    Basophils 1 0.0 - 2.0 %    Immature Granulocytes 1 0.0 - 5.0 %    Segs Absolute 5.6 1.7 - 8.2 K/UL    Absolute Lymph # 2.5 0.5 - 4.6 K/UL    Absolute Mono # 1.1 0.1 - 1.3 K/UL    Absolute Eos # 0.2 0.0 - 0.8 K/UL    Basophils Absolute 0.1 0.0 - 0.2 K/UL    Absolute Immature Granulocyte 0.1 0.0 - 0.5 K/UL   Comprehensive Metabolic Panel   Result Value Ref Range    Sodium 139 133 - 143 mmol/L    Potassium 4.3 3.5 - 5.1 mmol/L    Chloride 110 101 - 110 mmol/L    CO2 27 21 - 32 mmol/L    Anion Gap 2 2 - 11 mmol/L    Glucose 94 65 - 100 mg/dL    BUN 20 6 - 23 MG/DL    Creatinine 1.00 0.8 - 1.5 MG/DL    Est, Glom Filt Rate >60 >60 ml/min/1.73m2 Calcium 8.8 8.3 - 10.4 MG/DL    Total Bilirubin 0.2 0.2 - 1.1 MG/DL    ALT 12 12 - 65 U/L    AST 19 15 - 37 U/L    Alk Phosphatase 35 (L) 50 - 136 U/L    Total Protein 6.7 6.3 - 8.2 g/dL    Albumin 3.3 (L) 3.5 - 5.0 g/dL    Globulin 3.4 2.8 - 4.5 g/dL    Albumin/Globulin Ratio 1.0 0.4 - 1.6     Troponin   Result Value Ref Range    Troponin, High Sensitivity 144.9 (HH) 0 - 14 pg/mL   Brain Natriuretic Peptide   Result Value Ref Range    NT Pro- (H) 5 - 125 PG/ML   Troponin   Result Value Ref Range    Troponin, High Sensitivity 139.0 (HH) 0 - 14 pg/mL   EKG 12 Lead   Result Value Ref Range    Ventricular Rate 66 BPM    Atrial Rate 66 BPM    P-R Interval 134 ms    QRS Duration 122 ms    Q-T Interval 410 ms    QTc Calculation (Bazett) 429 ms    P Axis 0 degrees    R Axis 57 degrees    T Axis 56 degrees    Diagnosis Normal sinus rhythm    Vascular duplex lower extremity venous right    Narrative    Right lower extremity venous ultrasound    INDICATION:  Pain and swelling, right lower extremity. Doppler ultrasound of the right lower extremity was performed. FINDINGS:  There is normal flow in the greater saphenous, common femoral,  superficial femoral, and popliteal veins. Normal compression and augmentation is  demonstrated. The proximal calf veins are also patent. Impression    No evidence of deep venous thrombosis in the right lower extremity          CTA CHEST ABDOMEN PELVIS W WO CONTRAST   Final Result   1. No acute arterial process is identified. Stable mild ectasia of the   infrarenal abdominal aorta. XR CHEST (2 VW)   Final Result   The lungs are clear. Heart is normal in size status post CABG. No   pneumothorax. No pleural effusions. Vascular duplex lower extremity venous right   Final Result   No evidence of deep venous thrombosis in the right lower extremity                             Voice dictation software was used during the making of this note.   This software is not perfect and grammatical and other typographical errors may be present. This note has not been completely proofread for errors.      Forrest Carvajal, DO  12/05/22 1152       Forrest Carvajal, DO  12/05/22 1242       Forrest Carvajal, DO  12/05/22 1537       [id number] [facility] [address], DO  12/06/22 1418

## 2022-12-05 NOTE — ED NOTES
Chief Complaint: [Right leg swelling shortness of breath]     HPI: Patient to ER approximately 10-day history of slowly worsening right leg pain and swelling. Saw primary care physician last Thursday who ordered lab work and fluid pills. He has not gotten the fluid pills filled.   Patient has a history of coronary artery disease but no obvious  chf     Vital Signs   Patient Vitals for the past 4 hrs:   Temp Pulse Resp BP SpO2   12/05/22 0940 98.4 °F (36.9 °C) 66 19 (!) 94/59 98 %        Past Medical History:   Diagnosis Date    Aneurysm ([address]     followed by family doctor - not large enough for vascular yet    CAD (coronary artery disease)     mi--2014--- cabg 2014--- followed by dr Sven Chakraborty; also had 2 stents post CABG    Chronic pain     lumbar    Depressive disorder, not elsewhere classified     on med    Essential hypertension, benign     controlled with med- affirms would be SOB w 1 flight of steps    GERD (gastroesophageal reflux disease)     controlled with med    Obese     bmi 35.81    Pure hypercholesterolemia     Sleep apnea     dx long ago-- per pt-- never had any follow up- wife says he no longer snores since CABG    Thyroid disease     managed with medications        Past Surgical History:   Procedure Laterality Date    BACK SURGERY      DR Ariel Bailey, L3&4 August 2020    Dylon Bailey, november 2018    COLONOSCOPY N/A 8/17/2018    COLONOSCOPY/ 32 performed by Jazmin Sabillon MD at [facility] ENDOSCOPY    COLONOSCOPY FLX DX W/COLLJ [facility] REH[facility]ILITATION WHEN PFRMD  8/17/2018         KNEE ARTHROSCOPY Right YRS AGO    LUMBAR LAMINECTOMY  08/10/2020    right L3-4 lami with lysis of adhesions Dr. Audie Hassan  08/2018    [facility]    [place] CARDI[place] SUR[place] PROCEDURE UNLIST  2014    CABG     [place] CARDI[place] SUR[place] PROCEDURE UNLIST  2015    1 stent        Family History   Problem Relation Age of Onset    Diabetes Sister         type 2; insulin dep    Hypertension Father     Heart Disease Father         CABG, no MI     Cancer Mother         leukemia    Elevated Lipids Father         Social History     Socioeconomic History    Marital status:      Spouse name: None    Number of children: None    Years of education: None    Highest education level: None   Tobacco Use    Smoking status: Former     Packs/day: 0.50     Types: Cigarettes    Smokeless tobacco: Never    Tobacco comments:     Quit smoking: quit Oct 2018   Substance and Sexual Activity    Alcohol use: Yes    Drug use: No        Allergies: Patient has no known allergies. Previous Medications    ACETAMINOPHEN (TYLENOL) 325 MG TABLET    Take 650 mg by mouth every 6 hours as needed for Pain    AMLODIPINE (NORVASC) 10 MG TABLET    Take 10 mg by mouth 2 times daily    ASPIRIN 81 MG EC TABLET    Take by mouth daily    ATORVASTATIN (LIPITOR) 80 MG TABLET    Take 80 mg by mouth daily    DESVENLAFAXINE SUCCINATE (PRISTIQ) 100 MG TB24 EXTENDED RELEASE TABLET    Take 100 mg by mouth daily     EVOLOCUMAB 140 MG/ML SOAJ    Inject 140 mg into the skin    GABAPENTIN (NEURONTIN) 100 MG CAPSULE    TAKE 1 CAPSULE BY MOUTH THREE TIMES A DAY    ISOSORBIDE MONONITRATE (IMDUR) 120 MG EXTENDED RELEASE TABLET    Take 120 mg by mouth 2 times daily    LISINOPRIL (PRINIVIL;ZESTRIL) 20 MG TABLET    Take by mouth daily    LORAZEPAM (ATIVAN) 1 MG TABLET    TAKE 1 TABLET BY MOUTH TWICE A DAY    METOPROLOL (LOPRESSOR) 100 MG TABLET    Take 100 mg by mouth 2 times daily    NITROGLYCERIN (NITROSTAT) 0.4 MG SL TABLET    Place under the tongue     OMEPRAZOLE (PRILOSEC OTC) 20 MG TABLET    Take 20 mg by mouth daily    OMEPRAZOLE (PRILOSEC) 20 MG DELAYED RELEASE CAPSULE    TAKE 1 CAPSULE BY MOUTH EVERY DAY    PRASUGREL (EFFIENT) 10 MG TABS    Take 10 mg by mouth daily    RANOLAZINE (RANEXA) 500 MG EXTENDED RELEASE TABLET    Take 500 mg by mouth    TRAZODONE (DESYREL) 100 MG TABLET    TAKE 1 TABLET BY MOUTH EVERY DAY AT NIGHT          Brief PE:  GEN: Appears uncomfortable  CV:  As per triage vitals  PULM: [Breathing comfortably]  ABD: [No distention]  NEURO: [Awake, Alert]     Impression:  [Shortness of breath right leg swelling]     Plan: Rule out blood clot CHF exacerbation    Patient evaluated initially in triage. Rapid Medical Evaluation was conducted and necessary orders have been placed. I have performed a medical screening exam.  Care will now be transferred to the provider in the back of the emergency department.   CRISTO Varghese 9:46 AM       CRISTO Antonio  12/05/22 [id number]

## 2022-12-06 PROBLEM — F17.200 TOBACCO DEPENDENCE: Status: ACTIVE | Noted: 2022-12-06

## 2022-12-06 LAB
ANION GAP SERPL CALC-SCNC: 4 MMOL/L (ref 2–11)
BUN SERPL-MCNC: 16 MG/DL (ref 6–23)
CALCIUM SERPL-MCNC: 8.7 MG/DL (ref 8.3–10.4)
CHLORIDE SERPL-SCNC: 108 MMOL/L (ref 101–110)
CO2 SERPL-SCNC: 27 MMOL/L (ref 21–32)
CREAT SERPL-MCNC: 0.9 MG/DL (ref 0.8–1.5)
ERYTHROCYTE [DISTWIDTH] IN BLOOD BY AUTOMATED COUNT: 13.9 % (ref 11.9–14.6)
EST. AVERAGE GLUCOSE BLD GHB EST-MCNC: 97 MG/DL
GLUCOSE SERPL-MCNC: 79 MG/DL (ref 65–100)
HBA1C MFR BLD: 5 % (ref 4.8–5.6)
HCT VFR BLD AUTO: 38.9 % (ref 41.1–50.3)
HGB BLD-MCNC: 12.5 G/DL (ref 13.6–17.2)
MCH RBC QN AUTO: 32.1 PG (ref 26.1–32.9)
MCHC RBC AUTO-ENTMCNC: 32.1 G/DL (ref 31.4–35)
MCV RBC AUTO: 99.7 FL (ref 82–102)
NRBC # BLD: 0 K/UL (ref 0–0.2)
PLATELET # BLD AUTO: 272 K/UL (ref 150–450)
PMV BLD AUTO: 9.2 FL (ref 9.4–12.3)
POTASSIUM SERPL-SCNC: 3.6 MMOL/L (ref 3.5–5.1)
RBC # BLD AUTO: 3.9 M/UL (ref 4.23–5.6)
SODIUM SERPL-SCNC: 139 MMOL/L (ref 133–143)
WBC # BLD AUTO: 7.6 K/UL (ref 4.3–11.1)

## 2022-12-06 PROCEDURE — 36415 COLL VENOUS BLD VENIPUNCTURE: CPT

## 2022-12-06 PROCEDURE — 99223 1ST HOSP IP/OBS HIGH 75: CPT | Performed by: INTERNAL MEDICINE

## 2022-12-06 PROCEDURE — 6370000000 HC RX 637 (ALT 250 FOR IP): Performed by: INTERNAL MEDICINE

## 2022-12-06 PROCEDURE — 2580000003 HC RX 258: Performed by: NURSE PRACTITIONER

## 2022-12-06 PROCEDURE — 6360000002 HC RX W HCPCS: Performed by: NURSE PRACTITIONER

## 2022-12-06 PROCEDURE — 6370000000 HC RX 637 (ALT 250 FOR IP): Performed by: NURSE PRACTITIONER

## 2022-12-06 PROCEDURE — 83036 HEMOGLOBIN GLYCOSYLATED A1C: CPT

## 2022-12-06 PROCEDURE — 85027 COMPLETE CBC AUTOMATED: CPT

## 2022-12-06 PROCEDURE — 1100000003 HC PRIVATE W/ TELEMETRY

## 2022-12-06 PROCEDURE — 2700000000 HC OXYGEN THERAPY PER DAY

## 2022-12-06 PROCEDURE — 94640 AIRWAY INHALATION TREATMENT: CPT

## 2022-12-06 PROCEDURE — 80048 BASIC METABOLIC PNL TOTAL CA: CPT

## 2022-12-06 PROCEDURE — 94761 N-INVAS EAR/PLS OXIMETRY MLT: CPT

## 2022-12-06 PROCEDURE — 99232 SBSQ HOSP IP/OBS MODERATE 35: CPT | Performed by: INTERNAL MEDICINE

## 2022-12-06 PROCEDURE — 6360000002 HC RX W HCPCS: Performed by: INTERNAL MEDICINE

## 2022-12-06 RX ORDER — FUROSEMIDE 10 MG/ML
40 INJECTION INTRAMUSCULAR; INTRAVENOUS 2 TIMES DAILY
Status: DISCONTINUED | OUTPATIENT
Start: 2022-12-06 | End: 2022-12-07 | Stop reason: HOSPADM

## 2022-12-06 RX ORDER — IPRATROPIUM BROMIDE AND ALBUTEROL SULFATE 2.5; .5 MG/3ML; MG/3ML
1 SOLUTION RESPIRATORY (INHALATION) 3 TIMES DAILY
Status: DISCONTINUED | OUTPATIENT
Start: 2022-12-06 | End: 2022-12-07 | Stop reason: HOSPADM

## 2022-12-06 RX ORDER — DULOXETIN HYDROCHLORIDE 60 MG/1
60 CAPSULE, DELAYED RELEASE ORAL 2 TIMES DAILY
COMMUNITY

## 2022-12-06 RX ORDER — BUDESONIDE 0.5 MG/2ML
0.5 INHALANT ORAL 2 TIMES DAILY
Status: DISCONTINUED | OUTPATIENT
Start: 2022-12-06 | End: 2022-12-07 | Stop reason: HOSPADM

## 2022-12-06 RX ADMIN — LORAZEPAM 1 MG: 1 TABLET ORAL at 20:38

## 2022-12-06 RX ADMIN — MORPHINE SULFATE 2 MG: 2 INJECTION, SOLUTION INTRAMUSCULAR; INTRAVENOUS at 09:56

## 2022-12-06 RX ADMIN — RANOLAZINE 1000 MG: 500 TABLET, FILM COATED, EXTENDED RELEASE ORAL at 08:42

## 2022-12-06 RX ADMIN — MORPHINE SULFATE 2 MG: 2 INJECTION, SOLUTION INTRAMUSCULAR; INTRAVENOUS at 18:01

## 2022-12-06 RX ADMIN — BUDESONIDE 500 MCG: 0.5 SUSPENSION RESPIRATORY (INHALATION) at 13:12

## 2022-12-06 RX ADMIN — METOPROLOL TARTRATE 100 MG: 100 TABLET, FILM COATED ORAL at 20:38

## 2022-12-06 RX ADMIN — FUROSEMIDE 40 MG: 10 INJECTION, SOLUTION INTRAMUSCULAR; INTRAVENOUS at 08:42

## 2022-12-06 RX ADMIN — GABAPENTIN 300 MG: 300 CAPSULE ORAL at 20:38

## 2022-12-06 RX ADMIN — ACETAMINOPHEN 650 MG: 325 TABLET ORAL at 08:43

## 2022-12-06 RX ADMIN — SODIUM CHLORIDE, PRESERVATIVE FREE 10 ML: 5 INJECTION INTRAVENOUS at 08:44

## 2022-12-06 RX ADMIN — PRASUGREL 10 MG: 10 TABLET, FILM COATED ORAL at 08:48

## 2022-12-06 RX ADMIN — SODIUM CHLORIDE, PRESERVATIVE FREE 10 ML: 5 INJECTION INTRAVENOUS at 20:38

## 2022-12-06 RX ADMIN — IPRATROPIUM BROMIDE AND ALBUTEROL SULFATE 1 AMPULE: .5; 3 SOLUTION RESPIRATORY (INHALATION) at 19:59

## 2022-12-06 RX ADMIN — BUDESONIDE 500 MCG: 0.5 SUSPENSION RESPIRATORY (INHALATION) at 19:59

## 2022-12-06 RX ADMIN — IPRATROPIUM BROMIDE AND ALBUTEROL SULFATE 1 AMPULE: .5; 3 SOLUTION RESPIRATORY (INHALATION) at 13:12

## 2022-12-06 RX ADMIN — ATORVASTATIN CALCIUM 80 MG: 80 TABLET, FILM COATED ORAL at 08:42

## 2022-12-06 RX ADMIN — AMLODIPINE BESYLATE 10 MG: 10 TABLET ORAL at 08:42

## 2022-12-06 RX ADMIN — METOPROLOL TARTRATE 100 MG: 100 TABLET, FILM COATED ORAL at 08:43

## 2022-12-06 RX ADMIN — MORPHINE SULFATE 2 MG: 2 INJECTION, SOLUTION INTRAMUSCULAR; INTRAVENOUS at 05:21

## 2022-12-06 RX ADMIN — LISINOPRIL 20 MG: 20 TABLET ORAL at 08:43

## 2022-12-06 RX ADMIN — ENOXAPARIN SODIUM 40 MG: 100 INJECTION SUBCUTANEOUS at 17:35

## 2022-12-06 RX ADMIN — ISOSORBIDE MONONITRATE 120 MG: 60 TABLET, EXTENDED RELEASE ORAL at 08:42

## 2022-12-06 RX ADMIN — RANOLAZINE 1000 MG: 500 TABLET, FILM COATED, EXTENDED RELEASE ORAL at 20:38

## 2022-12-06 RX ADMIN — FUROSEMIDE 40 MG: 10 INJECTION, SOLUTION INTRAMUSCULAR; INTRAVENOUS at 17:35

## 2022-12-06 RX ADMIN — PANTOPRAZOLE SODIUM 40 MG: 40 TABLET, DELAYED RELEASE ORAL at 05:21

## 2022-12-06 RX ADMIN — GABAPENTIN 300 MG: 300 CAPSULE ORAL at 08:42

## 2022-12-06 RX ADMIN — ASPIRIN 81 MG: 81 TABLET ORAL at 08:42

## 2022-12-06 RX ADMIN — LORAZEPAM 1 MG: 1 TABLET ORAL at 08:42

## 2022-12-06 RX ADMIN — MORPHINE SULFATE 2 MG: 2 INJECTION, SOLUTION INTRAMUSCULAR; INTRAVENOUS at 14:06

## 2022-12-06 ASSESSMENT — PAIN SCALES - GENERAL
PAINLEVEL_OUTOF10: 8
PAINLEVEL_OUTOF10: 0
PAINLEVEL_OUTOF10: 3
PAINLEVEL_OUTOF10: 0
PAINLEVEL_OUTOF10: 9
PAINLEVEL_OUTOF10: 2
PAINLEVEL_OUTOF10: 7
PAINLEVEL_OUTOF10: 8
PAINLEVEL_OUTOF10: 2
PAINLEVEL_OUTOF10: 2
PAINLEVEL_OUTOF10: 4
PAINLEVEL_OUTOF10: 3
PAINLEVEL_OUTOF10: 8
PAINLEVEL_OUTOF10: 8

## 2022-12-06 ASSESSMENT — PAIN DESCRIPTION - DESCRIPTORS
DESCRIPTORS: ACHING
DESCRIPTORS: ACHING
DESCRIPTORS: ACHING;SHOOTING
DESCRIPTORS: ACHING;SHOOTING
DESCRIPTORS: ACHING
DESCRIPTORS: ACHING

## 2022-12-06 ASSESSMENT — PAIN DESCRIPTION - ORIENTATION
ORIENTATION: RIGHT

## 2022-12-06 ASSESSMENT — PAIN DESCRIPTION - LOCATION
LOCATION: HIP;LEG;FOOT
LOCATION: LEG
LOCATION: LEG;HIP;FOOT

## 2022-12-06 NOTE — CARE COORDINATION
Pt presented with increased LLE with R side greater than L for the past two weeks. Also endorses increased abdo edema. Has a PMHx of CAD s/p CABG, NSTEMI, HTN, AAA, chronic pain, BENITA, HLD, ED, cholecystitis, tobacco use, lumbar stenosis, and claudication. Pt is also an active smoker. Pt lives with his spouse. Indep with all his ADLs. Drives. Works [employer] Currently on 2L supplemental, denies home oxygen. Denies DME need. PCP confirmed. Insurance verified. Able to afford home meds. Will continue to follow for potential dcn. 12/06/22 1450   Service Assessment   Patient Orientation Alert and Oriented   Cognition Alert   History Provided By Patient   Primary Caregiver Self   Support Systems Spouse/Significant Other;Children;Family Members;[employer]onden[employer]/[place] [employer];Friends/Neighbors   PCP Verified by CM Yes  (Andrew)   Prior Functional Level Independent in ADLs/IADLs   Current Functional Level Independent in ADLs/IADLs   Can patient return to prior living arrangement Yes   Ability to make needs known: Good   Would you like for me to discuss the discharge plan with any other family members/significant others, and if so, who? Yes   Social/Functional History   Lives With Spouse   Type of Home House   ADL Assistance Independent   Ambulation Assistance Independent   Active  Yes   Mode of Transportation Car   Occupation Self employed   Discharge Planning   Type of [address] Prior To Admission None   Potential Assistance Needed N/A   DME Ordered? No   Potential Assistance Purchasing Medications No   Type of Home Care Services None   [facility]   Transition of Care Consult (CM Consult) Discharge [facility] Discharge None   [facility] Information Provided?  No   Mode of Transport at Discharge Other (see comment)  (Spouse)   Confirm Follow Up Transport Self

## 2022-12-06 NOTE — CONSULTS
PULMONARY/CRITICAL CARE CONSULT NOTE           12/6/2022    Joaquin Rodriguez                        Date of Admission:  12/5/2022    The patient's chart is reviewed and the patient is discussed with the staff. Subjective:     Patient is a 59 y.o.  male seen and evaluated at the request of Dr. Paul Reagan. Pateint with CAD with multiple percutaneous intervention, prior CABG 2014, Aneurysm,  GERD, obesity (BMI 35), BENITA (2020 with AHI of 19.6), smoker (1/2 PPD for 44 years) came with left leg pain and progressive with swelling with weakness. US leg negative for DVT. CTA of chest noted to have stable aneurysm with no PE. Given patient needed oxygen called to see if can assist with care. Also has history of BENITA but not on CPAP and wanted to assess him for it. Patient in room on 2 LPM. Awake and alert. No dyspnea. No cough. Repots smoking 1/2 PPD but does not use oxygen or inhalers at home. Has not has a spirometry in the past. Reports aware has BENITA but does not want CPAP. Wife reports he does snores and chokes at night. Patient weight is unchanged for years. 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 Does have few dogs at home.     Review of Systems: Comprehensive ROS negative except in HPI    Current Outpatient Medications   Medication Instructions    acetaminophen (TYLENOL) 650 mg, Oral, EVERY 6 HOURS PRN    amLODIPine (NORVASC) 10 mg, Oral, 2 TIMES DAILY    aspirin 81 MG EC tablet Oral, DAILY    atorvastatin (LIPITOR) 80 mg, Oral, DAILY    desvenlafaxine succinate (PRISTIQ) 100 mg, Oral, DAILY    Evolocumab 140 mg, SubCUTAneous    gabapentin (NEURONTIN) 300 mg, Oral, 2 times daily    isosorbide mononitrate (IMDUR) 120 mg, Oral, 2 TIMES DAILY    lisinopril (PRINIVIL;ZESTRIL) 20 MG tablet Oral, DAILY    LORazepam (ATIVAN) 1 MG tablet TAKE 1 TABLET BY MOUTH TWICE A DAY    metoprolol (LOPRESSOR) 100 mg, Oral, 2 TIMES DAILY    nitroGLYCERIN (NITROSTAT) 0.4 MG SL tablet SubLINGual    omeprazole (PRILOSEC OTC) 20 mg, Oral, DAILY    omeprazole (PRILOSEC) 20 MG delayed release capsule TAKE 1 CAPSULE BY MOUTH EVERY DAY    prasugrel (EFFIENT) 10 mg, Oral, DAILY    ranolazine (RANEXA) 500 mg, Oral    traZODone (DESYREL) 100 MG tablet TAKE 1 TABLET BY MOUTH EVERY DAY AT NIGHT      Past Medical History:   Diagnosis Date    Aneurysm ([address]     followed by family doctor - not large enough for vascular yet    CAD (coronary artery disease)     mi--2014--- cabg 2014--- followed by dr Oralia Hood; also had 2 stents post CABG    Chronic pain     lumbar    Depressive disorder, not elsewhere classified     on med    Essential hypertension, benign     controlled with med- affirms would be SOB w 1 flight of steps    GERD (gastroesophageal reflux disease)     controlled with med    Obese     bmi 35.81    Pure hypercholesterolemia     Sleep apnea     dx long ago-- per pt-- never had any follow up- wife says he no longer snores since CABG    Thyroid disease     managed with medications     Past Surgical History:   Procedure Laterality Date    BACK SURGERY      DR Kristin Lynn, L3&4 August 2020    [facility]      Dr Kristin Lynn, november 2018    COLONOSCOPY N/A 8/17/2018    COLONOSCOPY/ 32 performed by Juany Reyes MD at [facility] ENDOSCOPY    COLONOSCOPY FLX DX W/COLLJ SPEC WHEN PFRMD  8/17/2018         KNEE ARTHROSCOPY Right YRS AGO    LUMBAR LAMINECTOMY  08/10/2020    right L3-4 lami with lysis of adhesions Dr. Luciana Graves  08/2018    [facility]    HI CARDIAC SURG PROCEDURE UNLIST  2014    CABG     HI CARDIAC SURG PROCEDURE UNLIST  2015    1 stent     Social History     Socioeconomic History    Marital status:      Spouse name: Not on file    Number of children: Not on file    Years of education: Not on file    Highest education level: Not on file   Occupational History    Not on file   Tobacco Use    Smoking status: Former     Packs/day: 0.50     Types: Cigarettes    Smokeless tobacco: Never    Tobacco comments:     Quit smoking: quit Oct 2018   Substance and Sexual Activity    Alcohol use: Yes    Drug use: No    Sexual activity: Not on file   Other Topics Concern    Not on file   Social History Narrative    Not on file     Social Determinants of Health     Financial Resource Strain: Not on file   Food Insecurity: Not on file   Transportation Needs: Not on file   Physical Activity: Not on file   Stress: Not on file   Social Connections: Not on file   Intimate Partner Violence: Not on file   Housing Stability: Not on file     Family History   Problem Relation Age of Onset    Diabetes Sister         type 2; insulin dep    Hypertension Father     Heart Disease Father         CABG, no MI     Cancer Mother         leukemia    Elevated Lipids Father      No Known Allergies  Objective:   Blood pressure 119/64, pulse 63, temperature 98 °F (36.7 °C), temperature source Axillary, resp. rate 17, height 5' 9\" (1.753 m), weight 216 lb 6.4 oz (98.2 kg), SpO2 94 %. Intake/Output Summary (Last 24 hours) at 12/6/2022 1132  Last data filed at 12/6/2022 0805  Gross per 24 hour   Intake 240 ml   Output 700 ml   Net -460 ml     PHYSICAL EXAM   Constitutional:  the patient is well developed and in no acute distress  EENMT:  Sclera clear, pupils equal, oral mucosa moist, modified greco stage IV  Respiratory: symmetric chest rise. Distnat sounds but CTA b/l on 2 LPLM. Cardiovascular:  RRR without M,G,R. There is no lower extremity edema. Gastrointestinal: soft and non-tender; with positive bowel sounds. Musculoskeletal: warm without cyanosis. Normal muscle tone. Skin:  no jaundice or rashes, no visible wounds   Neurologic: symmetric strength, fluent speech  Psychiatric:  calm, appropriate, oriented x 4    Imaging: I performed an independent interpretation of the patient's images. CT Chest: minimal atelectasis in bases R.          Recent Labs     12/05/22  1010 12/05/22  1159 12/06/22  0332   WBC 9.4  --  7.6   HGB 12.1*  --  12.5*   HCT 37.3*  --  38.9*     --  272     --  139   K 4.3  --  3.6     --  108   CO2 27  --  27   BUN 20  --  16   CREATININE 1.00  --  0.90   BILITOT 0.2  --   --    AST 19  --   --    ALT 12  --   --    ALKPHOS 35*  --   --    TROPHS 144.9* 139.0*  --    NTPROBNP 414*  --   --      ECHO: 12/05/22    TRANSTHORACIC ECHOCARDIOGRAM (TTE) COMPLETE (CONTRAST/BUBBLE/3D PRN) 12/05/2022  4:56 PM (Final)    Interpretation Summary    Left Ventricle: Low normal left ventricular systolic function with a visually estimated EF of 50 - 55%. Left ventricle size is normal. Normal wall thickness. Normal wall motion. Abnormal diastolic function. Aortic Valve: Mild sclerosis of the aortic valve cusp. Mitral Valve: Valve structure is normal. Mildly calcified leaflet. Moderate annular calcification of the mitral valve. Mild regurgitation. Left Atrium: Left atrium is mildly dilated. Right Atrium: Right atrium is mildly dilated. Technical qualifiers: Color flow Doppler was performed and pulse wave and/or continuous wave Doppler was performed. Contrast used: Definity. Signed by: Marlyn Javed MD on 12/5/2022  4:56 PM    MICRO: No results for input(s): CULTURE in the last 72 hours. Assessment and Plan:  (Medical Decision Making)   Principal Problem:    Hypoxia  --multifactorial and likely from CAD and untreated COPD  --would ideally likely to get Spirometry but had chest pain this admission and will hold off  --add incentive spirometry since CT noted scant atelectasis  --will add inhalers for how as well -- Duoneb and Pulmicort    Active Problems:    Bilateral leg edema  Plan: diureses per PMD  --keep negative balance  --US negative for Dvt      BENITA (obstructive sleep apnea)  --has moderate BENITA per review of sleep sleep study. Agreeable to try nasal AutoCPAP here and will need outpatient f/u.  He is aware and agrees      Obesity (BMI 30.0-34.9)  --needs to lose weight by diet and exercise. Tobacco abuse  --will need to quit  --outpatient spirometry in the future  --CT negative for emphysema      Full Code    More than 50% of the time documented was spent in face-to-face contact with the patient and in the care of the patient on the floor/unit where the patient is located. Thank you very much for this referral.  We appreciate the opportunity to participate in this patient's care. Will follow along with above stated plan.     Norma Whaley MD

## 2022-12-06 NOTE — PROGRESS NOTES
[facility] CARDIOLOGY PROGRESS NOTE           12/6/2022 7:43 AM    Admit Date: 12/5/2022      Subjective:   Patient admitted with hypoxia, dyspnea, lower extremity edema and lower extremity pain. Work-up was negative for DVT or PE.  CT demonstrates stable aneurysm. Review of cardiac catheterization done at [facility] demonstrates diffuse pattern small vessel CAD. Patient's primary complaint this morning is chronic leg pain. ROS:  Cardiovascular:  As noted above    Objective:      Vitals:    12/05/22 2138 12/05/22 2331 12/06/22 0042 12/06/22 0441   BP: 135/71 122/68 126/78 95/81   Pulse: 70 66 63 66   Resp: 18  18 18   Temp: 98 °F (36.7 °C)  97.9 °F (36.6 °C) 97.8 °F (36.6 °C)   TempSrc:       SpO2: 96%  92% 90%   Weight: 216 lb 8 oz (98.2 kg)   216 lb 6.4 oz (98.2 kg)   Height: 5' 9\" (1.753 m)          Physical Exam:  General-No Acute Distress  Neck- supple, no JVD  CV- regular rate and rhythm no MRG  Lung- clear bilaterally  Abd- soft, nontender, nondistended  Ext- no edema bilaterally. Skin- warm and dry    Data Review:   Recent Labs     12/05/22  1010 12/06/22  0332    139   K 4.3 3.6   BUN 20 16   WBC 9.4 7.6   HGB 12.1* 12.5*   HCT 37.3* 38.9*    272       Assessment/Plan:     Principal Problem:    Hypoxia  Plan: Patient remains on 2 L nasal cannula. We will give additional furosemide 40 mg IV twice daily today. I have instructed staff to start daily weights and ins and outs. Have consulted pulmonary suspect patient has a significant opponent of underlying COPD. Once patient is stable on room air we determined patient will need chronic O2 should be stable for discharge. Active Problems:    Bilateral leg edema  Plan: Lower extremity markedly better with leg elevation. Suspect most of this is related to venous stasis. We will give IV Lasix again today. Venous duplex demonstrates no evidence of DVT.     BENITA (obstructive sleep apnea)  Plan: Patient with suspected severe untreated sleep apnea. Patient will need outpatient sleep study    Hypersomnia  Plan: Likely related to untreated sleep apnea    Essential hypertension, benign  Plan: Hemodynamics are stable    Obesity (BMI 30.0-34. 9)  Plan: Chronic    Coronary artery disease-patient followed by [place] cardiology. Last cardiac catheterization demonstrates 2 grafts patent with diffuse pattern small vessel disease beyond the graft has been managed medically. Will defer additional ischemic work-up to his primary cardiologist    Bilateral leg pain right greater than left-patient followed by vascular surgery. Has a history of diffuse severe pattern peripheral vascular disease and will defer treatment to his vascular surgeon.         Paramjit Nowak MD  12/6/2022 7:43 AM

## 2022-12-06 NOTE — PROGRESS NOTES
TRANSFER - IN REPORT:    Verbal report received from Dieter Cadena RN on Cyndi Bob  being received from ED for routine progression of patient care      Report consisted of patient's Situation, Background, Assessment and   Recommendations(SBAR). Information from the following report(s) ED SBAR was reviewed with the receiving nurse. Opportunity for questions and clarification was provided. Assessment completed upon patient's arrival to unit and care assumed. Dual skin assessment completed with García Corrigan RN. Pt's skin is warm, dry, and red. Pt has a mid-sternal scar from a previous CABG. BLE are 2+ pitting edema. Sacrum and heels are intact. No pressure injuries noted at this time.

## 2022-12-06 NOTE — PLAN OF CARE
Problem: Discharge Planning  Goal: Discharge to home or other facility with appropriate resources  Outcome: Progressing  Flowsheets (Taken 12/6/2022 0805)  Discharge to home or other facility with appropriate resources:   Identify barriers to discharge with patient and caregiver   Arrange for needed discharge resources and transportation as appropriate   Identify discharge learning needs (meds, wound care, etc)   Refer to discharge planning if patient needs post-hospital services based on physician order or complex needs related to functional status, cognitive ability or social support system     Problem: Pain  Goal: Verbalizes/displays adequate comfort level or baseline comfort level  Outcome: Progressing

## 2022-12-06 NOTE — RT PROTOCOL NOTE
Started patient on overnight oximetry. Patient is currently on room air. Will continue to monitor patient.

## 2022-12-07 VITALS
HEIGHT: 69 IN | HEART RATE: 62 BPM | RESPIRATION RATE: 20 BRPM | OXYGEN SATURATION: 94 % | TEMPERATURE: 99.1 F | SYSTOLIC BLOOD PRESSURE: 109 MMHG | BODY MASS INDEX: 31.84 KG/M2 | DIASTOLIC BLOOD PRESSURE: 46 MMHG | WEIGHT: 215 LBS

## 2022-12-07 PROBLEM — R09.02 HYPOXIA: Status: RESOLVED | Noted: 2022-12-05 | Resolved: 2022-12-07

## 2022-12-07 PROBLEM — E66.2 OBESITY HYPOVENTILATION SYNDROME (HCC): Status: ACTIVE | Noted: 2022-12-07

## 2022-12-07 PROBLEM — I87.8 CHRONIC VENOUS STASIS: Status: ACTIVE | Noted: 2022-12-07

## 2022-12-07 LAB
ANION GAP SERPL CALC-SCNC: 1 MMOL/L (ref 2–11)
BUN SERPL-MCNC: 21 MG/DL (ref 6–23)
CALCIUM SERPL-MCNC: 9.4 MG/DL (ref 8.3–10.4)
CHLORIDE SERPL-SCNC: 104 MMOL/L (ref 101–110)
CO2 SERPL-SCNC: 31 MMOL/L (ref 21–32)
CREAT SERPL-MCNC: 1 MG/DL (ref 0.8–1.5)
ERYTHROCYTE [DISTWIDTH] IN BLOOD BY AUTOMATED COUNT: 13.3 % (ref 11.9–14.6)
GLUCOSE SERPL-MCNC: 113 MG/DL (ref 65–100)
HCT VFR BLD AUTO: 40.5 % (ref 41.1–50.3)
HGB BLD-MCNC: 13 G/DL (ref 13.6–17.2)
MCH RBC QN AUTO: 32 PG (ref 26.1–32.9)
MCHC RBC AUTO-ENTMCNC: 32.1 G/DL (ref 31.4–35)
MCV RBC AUTO: 99.8 FL (ref 82–102)
NRBC # BLD: 0 K/UL (ref 0–0.2)
PLATELET # BLD AUTO: 290 K/UL (ref 150–450)
PMV BLD AUTO: 9.3 FL (ref 9.4–12.3)
POTASSIUM SERPL-SCNC: 3.8 MMOL/L (ref 3.5–5.1)
RBC # BLD AUTO: 4.06 M/UL (ref 4.23–5.6)
SODIUM SERPL-SCNC: 136 MMOL/L (ref 133–143)
WBC # BLD AUTO: 7.5 K/UL (ref 4.3–11.1)

## 2022-12-07 PROCEDURE — 36415 COLL VENOUS BLD VENIPUNCTURE: CPT

## 2022-12-07 PROCEDURE — 2700000000 HC OXYGEN THERAPY PER DAY

## 2022-12-07 PROCEDURE — 99232 SBSQ HOSP IP/OBS MODERATE 35: CPT | Performed by: INTERNAL MEDICINE

## 2022-12-07 PROCEDURE — 6370000000 HC RX 637 (ALT 250 FOR IP): Performed by: NURSE PRACTITIONER

## 2022-12-07 PROCEDURE — 6370000000 HC RX 637 (ALT 250 FOR IP): Performed by: INTERNAL MEDICINE

## 2022-12-07 PROCEDURE — 6360000002 HC RX W HCPCS: Performed by: INTERNAL MEDICINE

## 2022-12-07 PROCEDURE — 94761 N-INVAS EAR/PLS OXIMETRY MLT: CPT

## 2022-12-07 PROCEDURE — 6360000002 HC RX W HCPCS: Performed by: NURSE PRACTITIONER

## 2022-12-07 PROCEDURE — 2580000003 HC RX 258: Performed by: NURSE PRACTITIONER

## 2022-12-07 PROCEDURE — 80048 BASIC METABOLIC PNL TOTAL CA: CPT

## 2022-12-07 PROCEDURE — 85027 COMPLETE CBC AUTOMATED: CPT

## 2022-12-07 PROCEDURE — 94640 AIRWAY INHALATION TREATMENT: CPT

## 2022-12-07 RX ORDER — FUROSEMIDE 40 MG/1
40 TABLET ORAL DAILY
Qty: 60 TABLET | Refills: 3 | Status: SHIPPED | OUTPATIENT
Start: 2022-12-07

## 2022-12-07 RX ORDER — AMLODIPINE BESYLATE 10 MG/1
10 TABLET ORAL DAILY
Qty: 30 TABLET | Refills: 3 | Status: SHIPPED | OUTPATIENT
Start: 2022-12-08

## 2022-12-07 RX ADMIN — MORPHINE SULFATE 2 MG: 2 INJECTION, SOLUTION INTRAMUSCULAR; INTRAVENOUS at 04:52

## 2022-12-07 RX ADMIN — ASPIRIN 81 MG: 81 TABLET ORAL at 08:31

## 2022-12-07 RX ADMIN — LISINOPRIL 20 MG: 20 TABLET ORAL at 08:34

## 2022-12-07 RX ADMIN — ISOSORBIDE MONONITRATE 120 MG: 60 TABLET, EXTENDED RELEASE ORAL at 08:34

## 2022-12-07 RX ADMIN — SODIUM CHLORIDE, PRESERVATIVE FREE 5 ML: 5 INJECTION INTRAVENOUS at 08:34

## 2022-12-07 RX ADMIN — IPRATROPIUM BROMIDE AND ALBUTEROL SULFATE 1 AMPULE: .5; 3 SOLUTION RESPIRATORY (INHALATION) at 07:19

## 2022-12-07 RX ADMIN — BUDESONIDE 500 MCG: 0.5 SUSPENSION RESPIRATORY (INHALATION) at 07:19

## 2022-12-07 RX ADMIN — RANOLAZINE 1000 MG: 500 TABLET, FILM COATED, EXTENDED RELEASE ORAL at 08:33

## 2022-12-07 RX ADMIN — PANTOPRAZOLE SODIUM 40 MG: 40 TABLET, DELAYED RELEASE ORAL at 05:08

## 2022-12-07 RX ADMIN — PRASUGREL 10 MG: 10 TABLET, FILM COATED ORAL at 08:39

## 2022-12-07 RX ADMIN — ATORVASTATIN CALCIUM 80 MG: 80 TABLET, FILM COATED ORAL at 08:34

## 2022-12-07 RX ADMIN — METOPROLOL TARTRATE 100 MG: 100 TABLET, FILM COATED ORAL at 08:34

## 2022-12-07 RX ADMIN — LORAZEPAM 1 MG: 1 TABLET ORAL at 08:34

## 2022-12-07 RX ADMIN — FUROSEMIDE 40 MG: 10 INJECTION, SOLUTION INTRAMUSCULAR; INTRAVENOUS at 08:31

## 2022-12-07 RX ADMIN — GABAPENTIN 300 MG: 300 CAPSULE ORAL at 08:34

## 2022-12-07 RX ADMIN — AMLODIPINE BESYLATE 10 MG: 10 TABLET ORAL at 08:31

## 2022-12-07 ASSESSMENT — PAIN DESCRIPTION - PAIN TYPE: TYPE: CHRONIC PAIN

## 2022-12-07 ASSESSMENT — PAIN DESCRIPTION - FREQUENCY: FREQUENCY: INTERMITTENT

## 2022-12-07 ASSESSMENT — PAIN DESCRIPTION - ORIENTATION: ORIENTATION: RIGHT

## 2022-12-07 ASSESSMENT — PAIN DESCRIPTION - ONSET: ONSET: ON-GOING

## 2022-12-07 ASSESSMENT — PAIN DESCRIPTION - DESCRIPTORS: DESCRIPTORS: THROBBING

## 2022-12-07 ASSESSMENT — PAIN SCALES - GENERAL: PAINLEVEL_OUTOF10: 10

## 2022-12-07 ASSESSMENT — PAIN DESCRIPTION - LOCATION: LOCATION: LEG

## 2022-12-07 NOTE — CARE COORDINATION
Pt is expected to discharge later today. DONNA performed last night; 75% on 2L. Clinicals and DONNA sent to [facility] Pt was sleeping, CM explained to his wife that PM will reach out to them about delivering the oxygen to their home address; reported understanding. Pt does not qualify for oxygen during the day per RT:    12/07/22 1100    Resting (Room Air)   SpO2 92   HR 84   During Walk (Room Air)   SpO2 94   HR 87   Walk/Assistance Device Ambulation   Rate of Dyspnea 0   After Walk   SpO2 93   HR 88   Rate of Dyspnea 0   Does the Patient Qualify for Home O2 No   Does the Patient Need Portable Oxygen Tanks No      No other discharge needs were identified. Tx goals have been met. 12/07/22 [address] Discharge   Transition of Care Consult (CM Consult) Discharge Planning; Other  (ON O2 need)   Services At/After Discharge None   [employer] Information Provided? No   Mode of Transport at Discharge Other (see comment)  (Family)   Confirm Follow Up Transport Family   Condition of Participation: Discharge Planning   The Patient and/or Patient Representative was provided with a Choice of Provider? Patient   The Patient and/Or Patient Representative agree with the Discharge Plan? Yes   Freedom of Choice list was provided with basic dialogue that supports the patient's individualized plan of care/goals, treatment preferences, and shares the quality data associated with the providers?   Yes

## 2022-12-07 NOTE — PROGRESS NOTES
12/07/22 1100   Resting (Room Air)   SpO2 92   HR 84   During Walk (Room Air)   SpO2 94   HR 87   Walk/Assistance Device Ambulation   Rate of Dyspnea 0   After Walk   SpO2 93   HR 88   Rate of Dyspnea 0   Does the Patient Qualify for Home O2 No   Does the Patient Need Portable Oxygen Tanks No

## 2022-12-07 NOTE — PROGRESS NOTES
Joaquin Rodriguez  Admission Date: 12/5/2022         Daily Progress Note: 12/7/2022    The patient's chart is reviewed and the patient is discussed with the staff. Background: Patient is a 59 y.o.  male seen and evaluated at the request of Dr. Paul Reagan. Pateint with CAD with multiple percutaneous intervention, prior CABG 2014, Aneurysm,  GERD, obesity (BMI 35), BENITA (2020 with AHI of 19.6), smoker (1/2 PPD for 44 years) came with left leg pain and progressive with swelling with weakness. US leg negative for DVT. CTA of chest noted to have stable aneurysm with no PE. Given patient needed oxygen called to see if can assist with care. Also has history of BENITA but not on CPAP and wanted to assess him for it. Patient in room on 2 LPM. Awake and alert. No dyspnea. No cough. Repots smoking 1/2 PPD but does not use oxygen or inhalers at home. Has not has a spirometry in the past. Reports aware has BENITA but does not want CPAP. Wife reports he does snores and chokes at night. Patient weight is unchanged for years. 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 Does have few dogs at home. Subjective:     Pt is currently on room air. States his breathing is feeling good. To be discharged home today. Had DONNA on RA last night that > 5 minutes of desaturation. He states he is willing to consider CPAP again. Last sleep study > 2 years ago.      Current Facility-Administered Medications   Medication Dose Route Frequency    furosemide (LASIX) injection 40 mg  40 mg IntraVENous BID    ipratropium-albuterol (DUONEB) nebulizer solution 1 ampule  1 ampule Inhalation TID    budesonide (PULMICORT) nebulizer suspension 500 mcg  0.5 mg Nebulization BID    albuterol (PROVENTIL) nebulizer solution 2.5 mg  2.5 mg Nebulization Q6H PRN    amLODIPine (NORVASC) tablet 10 mg  10 mg Oral Daily    aspirin EC tablet 81 mg  81 mg Oral Daily    atorvastatin (LIPITOR) tablet 80 mg  80 mg Oral Daily    desvenlafaxine succinate (PRISTIQ) extended release tablet 100 mg- patient supplied (Patient Supplied)  100 mg Oral Daily    isosorbide mononitrate (IMDUR) extended release tablet 120 mg  120 mg Oral Daily    lisinopril (PRINIVIL;ZESTRIL) tablet 20 mg  20 mg Oral Daily    LORazepam (ATIVAN) tablet 1 mg  1 mg Oral BID    metoprolol (LOPRESSOR) tablet 100 mg  100 mg Oral BID    nitroGLYCERIN (NITROSTAT) SL tablet 0.4 mg  0.4 mg SubLINGual Q5 Min PRN    pantoprazole (PROTONIX) tablet 40 mg  40 mg Oral QAM AC    prasugrel (EFFIENT) tablet 10 mg  10 mg Oral Daily    ranolazine (RANEXA) extended release tablet 1,000 mg  1,000 mg Oral BID    sodium chloride flush 0.9 % injection 5-40 mL  5-40 mL IntraVENous 2 times per day    sodium chloride flush 0.9 % injection 5-40 mL  5-40 mL IntraVENous PRN    0.9 % sodium chloride infusion   IntraVENous PRN    ondansetron (ZOFRAN-ODT) disintegrating tablet 4 mg  4 mg Oral Q8H PRN    Or    ondansetron (ZOFRAN) injection 4 mg  4 mg IntraVENous Q6H PRN    acetaminophen (TYLENOL) tablet 650 mg  650 mg Oral Q6H PRN    Or    acetaminophen (TYLENOL) suppository 650 mg  650 mg Rectal Q6H PRN    polyethylene glycol (GLYCOLAX) packet 17 g  17 g Oral Daily PRN    potassium chloride (KLOR-CON M) extended release tablet 40 mEq  40 mEq Oral PRN    Or    potassium bicarb-citric acid (EFFER-K) effervescent tablet 40 mEq  40 mEq Oral PRN    Or    potassium chloride 10 mEq/100 mL IVPB (Peripheral Line)  10 mEq IntraVENous PRN    magnesium sulfate 2000 mg in 50 mL IVPB premix  2,000 mg IntraVENous PRN    enoxaparin (LOVENOX) injection 40 mg  40 mg SubCUTAneous Daily    gabapentin (NEURONTIN) capsule 300 mg  300 mg Oral BID    morphine injection 2 mg  2 mg IntraVENous Q4H PRN     Review of Systems: Comprehensive ROS negative except in HPI  Objective:   Blood pressure 139/62, pulse 67, temperature 98.7 °F (37.1 °C), temperature source Oral, resp.  rate 20, height 5' 9\" (1.753 m), weight 215 lb (97.5 kg), SpO2 94 %. Intake/Output Summary (Last 24 hours) at 12/7/2022 1033  Last data filed at 12/7/2022 0830  Gross per 24 hour   Intake 1060 ml   Output 2425 ml   Net -1365 ml     Physical Exam:   Constitutional:  the patient is well developed and in no acute distress  EENMT:  Sclera clear, pupils equal, oral mucosa moist  Respiratory: symmetric chest rise. CTA B, no w/r/r  Cardiovascular:  RRR without M,G,R. There is trace lower extremity edema. Gastrointestinal: soft and non-tender; with positive bowel sounds. Musculoskeletal: warm without cyanosis. Normal muscle tone. Skin:  no jaundice or rashes, no wounds   Neurologic: symmetric strength, fluent speech  Psychiatric:  calm, appropriate, oriented x 4    Imaging: I performed an independent interpretation of the patient's images. CXR:   No new imaging    LAB:  Recent Labs     12/05/22  1010 12/06/22  0332 12/07/22  0444   WBC 9.4 7.6 7.5   HGB 12.1* 12.5* 13.0*   HCT 37.3* 38.9* 40.5*    272 290     Recent Labs     12/05/22  1010 12/06/22  0332 12/07/22  0444    139 136   K 4.3 3.6 3.8    108 104   CO2 27 27 31   BUN 20 16 21   CREATININE 1.00 0.90 1.00   BILITOT 0.2  --   --    AST 19  --   --    ALT 12  --   --    ALKPHOS 35*  --   --      Recent Labs     12/05/22  1010 12/05/22  1159   TROPHS 144.9* 139.0*   NTPROBNP 414*  --      Recent Labs     12/05/22  1010 12/06/22  0332 12/07/22  0444   GLUCOSE 94 79 113*      Microbiology:   No results for input(s): CULTURE in the last 72 hours. ECHO: 12/05/22    TRANSTHORACIC ECHOCARDIOGRAM (TTE) COMPLETE (CONTRAST/BUBBLE/3D PRN) 12/05/2022  4:56 PM (Final)    Interpretation Summary    Left Ventricle: Low normal left ventricular systolic function with a visually estimated EF of 50 - 55%. Left ventricle size is normal. Normal wall thickness. Normal wall motion. Abnormal diastolic function. Aortic Valve: Mild sclerosis of the aortic valve cusp.     Mitral Valve: Valve structure is normal. Mildly calcified leaflet. Moderate annular calcification of the mitral valve. Mild regurgitation. Left Atrium: Left atrium is mildly dilated. Right Atrium: Right atrium is mildly dilated. Technical qualifiers: Color flow Doppler was performed and pulse wave and/or continuous wave Doppler was performed. Contrast used: Definity. Signed by: Mary Girard MD on 12/5/2022  4:56 PM    Assessment and Plan:  (Medical Decision Making)   Principal Problem:    Hypoxia  Plan: on room air currently. Will have RT check amb sat prior to discharge. Active Problems:    Bilateral leg edema  Plan: improved. BENITA (obstructive sleep apnea)  Plan: previously stated he would not be willing to try CPAP. Today says he is open to that. Based on DONNA would start 2L O2 with sleep and will set up with our sleep office and will need repeat sleep study. Message sent to our office. More than 50% of the time documented was spent in face-to-face contact with the patient and in the care of the patient on the floor/unit where the patient is located.     Eduardo Baird MD

## 2022-12-07 NOTE — PROGRESS NOTES
Pt educated on the reason the MD ordered CPAP for tonight. Pt stated that he had never tried a CPAP and was hesitant on wearing a full face mask. Informed pt that the mask at bedside was a nasal mask and not a full face mask. Pt stated understanding and willingness to try the CPAP tonight and at home in the future. RT on floor and informed of pt's decision to try CPAP tonight. RT will come back and place pt on CPAP per RT.

## 2022-12-07 NOTE — DISCHARGE SUMMARY
[facility] Cardiology Discharge Summary     Patient ID:  Kalpana Shen  [id number]  5 y.o.  1965    Admit date: 12/5/2022    Discharge date and time[de-identified] 12/07/22      Admitting Physician: Cele Schwarz MD     Discharge Physician: Adair Pal, MONO - VERÓNICA/    Admission Diagnoses: Hypoxia [R09.02]  Bilateral leg edema [R60.0]    Discharge Diagnoses:     Principal Problem (Resolved): Hypoxia  Active Problems:    Bilateral leg edema    Chronic venous stasis    Obesity hypoventilation syndrome (HCC)    BENITA (obstructive sleep apnea)    Hypersomnia    Tobacco dependence    Essential hypertension, benign    Obesity (BMI 30.0-34. 9)      Cardiology Procedures this admission: Nighttime Osteometry, Echo    Consults: Pulmonary    Hospital Course: The patient has had two weeks of increase LLE with right side greater than left. This is painful which gets better with certain positions. He endorses increase abd edema with his paints fitting tighter but he can lay flat at night. He has been diagnosed with BENITA but does not see a pulmonologist or use a CPAP. During the exam the patient was having a hard time staying awake with a BMP co2 of 27. Review of other labs show minimally elevated HS Trop T trending down, Cr 1.0, Albumin 3.3, Alk Phos 35, NT Pro BMP of 414 with previous 531  The patient did not appear to be markedly volume overloaded by exam but had chronic LLE noted. The patient has a hx of OAS but has not worn CPAP and has multiple years of smoking as well. 12/05/22    TRANSTHORACIC ECHOCARDIOGRAM (TTE) COMPLETE (CONTRAST/BUBBLE/3D PRN) 12/05/2022  4:56 PM (Final)    Interpretation Summary    Left Ventricle: Low normal left ventricular systolic function with a visually estimated EF of 50 - 55%. Left ventricle size is normal. Normal wall thickness. Normal wall motion. Abnormal diastolic function. Aortic Valve: Mild sclerosis of the aortic valve cusp.     Mitral Valve: Valve structure is normal. Mildly calcified leaflet. Moderate annular calcification of the mitral valve. Mild regurgitation. Left Atrium: Left atrium is mildly dilated. Right Atrium: Right atrium is mildly dilated. Technical qualifiers: Color flow Doppler was performed and pulse wave and/or continuous wave Doppler was performed. Contrast used: Definity. Signed by: Cele Schwarz MD on 12/5/2022  4:56 PM    Lower Extremity US 12/5/2022    Right lower extremity venous ultrasound     INDICATION:  Pain and swelling, right lower extremity. Doppler ultrasound of the right lower extremity was performed. FINDINGS:  There is normal flow in the greater saphenous, common femoral,  superficial femoral, and popliteal veins. Normal compression and augmentation is  demonstrated. The proximal calf veins are also patent. IMPRESSION:  No evidence of deep venous thrombosis in the right lower extremity    CT Result (most recent):  CTA CHEST ABDOMEN PELVIS W WO CONTRAST 12/05/2022    Narrative  Title:  CTA  of the chest, abdomen and pelvis. Aorta:  No evidence of aortic aneurysm or dissection. There is mild ectasia of  the infrarenal aorta. No change. Extensive atherosclerotic changes are present. Vascular/Other:  Visualized vessels in the mesenteric vessels are patent. Impression  1. No acute arterial process is identified. Stable mild ectasia of the  infrarenal abdominal aorta. Overnight the patient did remarkably better with elevated legs and appears to have venous stasis. Would use elevation and teed hose in the outpatient setting. Pulmonary evaluated the patient with review of overnight 02 study shows the patient needs 2 lpm at night and will be set up for overnight sleep study in the outpatient setting. Currently the patient is willing to look at wearing a CPAP again. Walking o2 sat showed no need for daytime o2 use at this time    The day of discharge Pt's labs were WNL.  Pt was seen and examined by Dr. Angeles Villa and determined stable and ready for discharge. The patient will follow up with is primary cardiology group. Pt was instructed to follow discharge instructions given by nursing staff. DISPOSITION: The patient is being discharged home in stable condition on a low saturated fat, low cholesterol and low salt diet. Pt is instructed to advance activities as tolerated limited to fatigue or shortness of breath. Discharge Exam: /62   Pulse 67   Temp 98.7 °F (37.1 °C) (Oral)   Resp 20   Ht 5' 9\" (1.753 m)   Wt 215 lb (97.5 kg)   SpO2 94%   BMI 31.75 kg/m²   Pt has been seen by Dr Angeles Villa: see his progress note for exam details.     Recent Results (from the past 24 hour(s))   Basic Metabolic Panel w/ Reflex to MG    Collection Time: 12/07/22  4:44 AM   Result Value Ref Range    Sodium 136 133 - 143 mmol/L    Potassium 3.8 3.5 - 5.1 mmol/L    Chloride 104 101 - 110 mmol/L    CO2 31 21 - 32 mmol/L    Anion Gap 1 (L) 2 - 11 mmol/L    Glucose 113 (H) 65 - 100 mg/dL    BUN 21 6 - 23 MG/DL    Creatinine 1.00 0.8 - 1.5 MG/DL    Est, Glom Filt Rate >60 >60 ml/min/1.73m2    Calcium 9.4 8.3 - 10.4 MG/DL   CBC    Collection Time: 12/07/22  4:44 AM   Result Value Ref Range    WBC 7.5 4.3 - 11.1 K/uL    RBC 4.06 (L) 4.23 - 5.6 M/uL    Hemoglobin 13.0 (L) 13.6 - 17.2 g/dL    Hematocrit 40.5 (L) 41.1 - 50.3 %    MCV 99.8 82 - 102 FL    MCH 32.0 26.1 - 32.9 PG    MCHC 32.1 31.4 - 35.0 g/dL    RDW 13.3 11.9 - 14.6 %    Platelets 243 833 - 426 K/uL    MPV 9.3 (L) 9.4 - 12.3 FL    nRBC 0.00 0.0 - 0.2 K/uL         Patient Instructions:     Current Discharge Medication List        START taking these medications    Details   furosemide (LASIX) 40 MG tablet Take 1 tablet by mouth daily  Qty: 60 tablet, Refills: 3           CONTINUE these medications which have CHANGED    Details   amLODIPine (NORVASC) 10 MG tablet Take 1 tablet by mouth daily  Qty: 30 tablet, Refills: 3           CONTINUE these medications which have NOT CHANGED    Details   DULoxetine (CYMBALTA) 60 MG extended release capsule Take 60 mg by mouth 2 times daily      gabapentin (NEURONTIN) 100 MG capsule Take 300 mg by mouth in the morning and at bedtime. LORazepam (ATIVAN) 1 MG tablet TAKE 1 TABLET BY MOUTH TWICE A DAY      desvenlafaxine succinate (PRISTIQ) 100 MG TB24 extended release tablet Take 100 mg by mouth daily       acetaminophen (TYLENOL) 325 mg tablet Take 650 mg by mouth every 6 hours as needed for Pain      aspirin 81 MG EC tablet Take by mouth daily      atorvastatin (LIPITOR) 80 MG tablet Take 80 mg by mouth daily      Evolocumab 140 MG/ML SOAJ Inject 140 mg into the skin      isosorbide mononitrate (IMDUR) 120 MG extended release tablet Take 120 mg by mouth 2 times daily      lisinopril (PRINIVIL;ZESTRIL) 20 MG tablet Take by mouth daily      metoprolol (LOPRESSOR) 100 MG tablet Take 100 mg by mouth 2 times daily      nitroGLYCERIN (NITROSTAT) 0.4 MG SL tablet Place under the tongue       omeprazole (PRILOSEC OTC) 20 MG tablet Take 20 mg by mouth daily      prasugrel (EFFIENT) 10 MG TABS Take 10 mg by mouth daily      ranolazine (RANEXA) 500 MG extended release tablet Take 500 mg by mouth           STOP taking these medications       traZODone (DESYREL) 100 MG tablet Comments:   Reason for Stopping:         omeprazole (PRILOSEC) 20 MG delayed release capsule Comments:   Reason for Stopping:             Note some or all of the above medications that were set to stop by the system but the pt was not taking in the outpatient setting. Limitations in the system make it appear that we have stopped the medications when we have not. Signed:  MONO Porter - CNP  12/7/2022  10:46 AM    Patient seen and examined by me. Agree with above note by physician extender.   Key findings are:  No CP or BURK  CV- RRR without murmur  Lungs- Clear bilaterally  Ext- no edema    Plan: Acute diastolic congestive heart failure-improved. Off O2. Home on p.o.  Lasix

## 2022-12-07 NOTE — PROGRESS NOTES
Pt declined to wear CPAP - stating he has never worn one & never wants to wear one- no distress noted at this time    12/06/22 1959   Oxygen Therapy/Pulse Ox   O2 Therapy Oxygen   O2 Device Nasal cannula   O2 Flow Rate (L/min) 2 L/min   Heart Rate 68   Resp 17   SpO2 96 %   Pulse Oximeter Device Mode Intermittent

## 2022-12-08 ENCOUNTER — CARE COORDINATION (OUTPATIENT)
Dept: CARE COORDINATION | Facility: CLINIC | Age: 57
End: 2022-12-08

## 2022-12-08 NOTE — CARE COORDINATION
Care Transitions Outreach Attempt    Call within 2 business days of discharge: Yes   Attempted to reach patient for transitions of care follow up. Unable to reach patient. Patient: Michael Stone Patient : 1965 MRN: [id number]    Last Discharge  Street       Date Complaint Diagnosis Description Type Department Provider    22 Leg Swelling; Shortness of Breath Bilateral leg edema . .. ED to Hosp-Admission (Discharged) (ADMITTED) Maria M Pretty MD; Raj Mares. .. Was this an external facility discharge?  No Discharge Facility: [facility]    Noted following upcoming appointments from discharge chart review:   [facility] follow up appointment(s):   Future Appointments   Date Time Provider Sarai Johns   2022 10:40 AM Albert Kuhn MD [facility] AMB     Non-[facility] follow up appointment(s): follow up with [facility] TBD

## 2022-12-09 ENCOUNTER — CARE COORDINATION (OUTPATIENT)
Dept: CARE COORDINATION | Facility: CLINIC | Age: 57
End: 2022-12-09

## 2022-12-09 NOTE — CARE COORDINATION
Care Transitions Outreach Attempt    Call within 2 business days of discharge: Yes   Attempted to reach patient for transitions of care follow up. Unable to reach patient. Patient: Ani Woodson Patient : 1965 MRN: [id number]    Last Discharge  Street       Date Complaint Diagnosis Description Type Department Provider    22 Leg Swelling; Shortness of Breath Bilateral leg edema . .. ED to Hosp-Admission (Discharged) (ADMITTED) Arleen Brewster MD; Jed Shea. .. Was this an external facility discharge?  No Discharge Facility: [facility]    Noted following upcoming appointments from discharge chart review:   [facility]):   Future Appointments   Date Time Provider Sarai Johns   2022 10:40 AM Dee Gallardo MD [facility] AMB   2022  2:00 PM MONO Alejandro - VERÓNICA [facility]CD [facility] AMB

## 2022-12-12 NOTE — PROGRESS NOTES
[facility]  Consultation Note    Patient ID:  Name: Sixto Platt  MRN: [id number]  AGE: 59 y.o.  : 1965    Date of Consultation:  2022    CC:   Chief Complaint   Patient presents with    Back Problem           HPI:  Mr. Jose Contreras is a 75-year-old man who presents today for follow-up of low back pain. He has pain in the right low back. The pain radiates to the right leg. He also has discomfort in the right groin. The pain extends into the anterior leg, below the knee. The pain is associate with right leg paresthesias. The pain has been very severe. 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 He is in the process of filing for disability because of the severity of the back pain. In the past, he had relief with bilateral L4-5-S1 facet joint radiofrequency denervation. However, when that was repeated recently, he did not have relief. The pain has become more radicular in nature over time. MRI lumbar spine 2022 showed unilateral left instrumentation and fusion L3-4 with a new disc extrusion at L1-2. Not mentioned on the MRI report, on my review of the films, he does appear to have a disc herniation at L2-3 as well. He has significant central stenosis at L2-3 and to a lesser extent at L1-2. Not mentioned on report, he also has central stenosis at L4-5 with bilateral lateral recess narrowing at L4-5. He also has severe right L1-2 lateral recess narrowing we discussed these findings and reviewed these images together today.      Past Medical History Includes:   Past Medical History:   Diagnosis Date    Aneurysm ([address]     followed by family doctor - not large enough for vascular yet    CAD (coronary artery disease)     mi----- cabg --- followed by dr Marie Nova; also had 2 stents post CABG    Chronic pain     lumbar    Depressive disorder, not elsewhere classified     on med    Essential hypertension, benign     controlled with med- affirms would be SOB w 1 flight of steps    GERD (gastroesophageal reflux disease)     controlled with med    Obese     bmi 35.81    Pure hypercholesterolemia     Sleep apnea     dx long ago-- per pt-- never had any follow up- wife says he no longer snores since CABG    Thyroid disease     managed with medications   ,   Past Surgical History:   Procedure Laterality Date    BACK SURGERY      DR Rylie Casanova, L3&4 August 2020    Ranae Kanner Dr Maybell Basques, november 2018    COLONOSCOPY N/A 8/17/2018    COLONOSCOPY/ 32 performed by Sandip Dobson MD at [facility] ENDOSCOPY    COLONOSCOPY FLX DX W/COLLJ SPEC WHEN PFRMD  8/17/2018         KNEE ARTHROSCOPY Right YRS AGO    LUMBAR LAMINECTOMY  08/10/2020    right L3-4 lami with lysis of adhesions Dr. Jose Alfredo Ortiz  08/2018    [place] CARD[place] PROCEDURE [place]  2014    [place]AB[place]     [place] CARDI[place] PROCEDURE [place]LIST  2015    1 stent     Family History:   Family History   Problem Relation Age of Onset    Diabetes Sister         type 2; insulin dep    Hypertension Father     Heart Disease Father         CABG, no MI     Cancer Mother         leukemia    Elevated Lipids Father       Social History:   Social History     Tobacco Use    Smoking status: Former     Packs/day: 0.50     Types: Cigarettes    Smokeless tobacco: Never    Tobacco comments:     Quit smoking: quit Oct 2018   Substance Use Topics    Alcohol use: Yes       ALLERGIES: No Known Allergies     Patient Medications    Current Outpatient Medications   Medication Sig    HYDROcodone-acetaminophen (NORCO) 5-325 MG per tablet Take 1 tablet by mouth 2 times daily as needed for Pain for up to 30 days.     albuterol sulfate HFA (PROVENTIL;VENTOLIN;PROAIR) 108 (90 Base) MCG/ACT inhaler Inhale 2 puffs into the lungs every 6 hours as needed for Wheezing or Shortness of Breath    amLODIPine (NORVASC) 10 MG tablet Take 1 tablet by mouth daily    furosemide (LASIX) 40 MG tablet Take 1 tablet by mouth daily    DULoxetine (CYMBALTA) 60 MG extended release capsule Take 60 mg by mouth 2 times daily    gabapentin (NEURONTIN) 100 MG capsule Take 300 mg by mouth in the morning and at bedtime. LORazepam (ATIVAN) 1 MG tablet TAKE 1 TABLET BY MOUTH TWICE A DAY    desvenlafaxine succinate (PRISTIQ) 100 MG TB24 extended release tablet Take 100 mg by mouth daily  (Patient not taking: Reported on 12/13/2022)    acetaminophen (TYLENOL) 325 mg tablet Take 650 mg by mouth every 6 hours as needed for Pain    aspirin 81 MG EC tablet Take by mouth daily    atorvastatin (LIPITOR) 80 MG tablet Take 80 mg by mouth daily    Evolocumab 140 MG/ML SOAJ Inject 140 mg into the skin    isosorbide mononitrate (IMDUR) 120 MG extended release tablet Take 120 mg by mouth 2 times daily    lisinopril (PRINIVIL;ZESTRIL) 20 MG tablet Take by mouth daily    metoprolol (LOPRESSOR) 100 MG tablet Take 100 mg by mouth 2 times daily    nitroGLYCERIN (NITROSTAT) 0.4 MG SL tablet Place under the tongue     omeprazole (PRILOSEC OTC) 20 MG tablet Take 20 mg by mouth daily    prasugrel (EFFIENT) 10 MG TABS Take 10 mg by mouth daily    ranolazine (RANEXA) 500 MG extended release tablet Take 500 mg by mouth     No current facility-administered medications for this visit. ROS/Meds/PSH/PMH/FH/SH: I personally reviewed the patients standard intake form. Physical Exam:      Limited: PE: General: Awake, alert, no distress. HEENT: Mucous membranes moist and pink. Psych: Appropriate and conversant. Derm: No rash Gait: Unassisted, non-ataxic MS: He has pain in the right groin with right hip internal rotation and resisted right hip flexion. No pain with left hip internal rotation or resisted left hip flexion. He has tenderness to palpation centrally of the lumbar spine and over the right lumbar paraspinals. Straight leg raise is positive on the left, negative on the right. Strength is 5/5 and symmetric.   There is no foot drop bilaterally. VITALS: [facility] , [id number]       No flowsheet data found. Pelvis and right hip XR: AP, Lateral views     Clinical Indication    ICD-10-CM    1. Right hip pain  M25.551 XR HIP 2-3 VW W PELVIS RIGHT     Injection Authorization - Brianna Sotelo MD, Orthopedic Surgery, . [address]      2. Lumbar spondylosis  M47.816 Injection Authorization - Brianna Sotelo MD, Orthopedic Surgery, . [address]      3. Disc degeneration, lumbar  M51.36 Injection Authorization - Brianna Sotelo MD, Orthopedic Surgery, . [address]      4. Lumbar radiculopathy  M54.16 Injection Authorization - Brianna Sotelo MD, Orthopedic Surgery, . [address]       5. Spinal stenosis of lumbar region with neurogenic claudication  M48.062 HYDROcodone-acetaminophen (NORCO) 5-325 MG per tablet     Injection Authorization - Brianna Sotelo MD, Orthopedic Surgery, [place]              Report: AP, lateral x-ray of the pelvis and right hip demonstrates no significant abnormality of the pelvis or right hip. We discussed these findings and reviewed these images together today. Impression: no significant abnormality of the pelvis or right hip. Yair Garcia MD         No results found for any visits on 12/13/22. Assessment and Plan:     ICD-10-CM    1. Right hip pain  M25.551 XR HIP 2-3 VW W PELVIS RIGHT     Injection Authorization - Brianna Sotelo MD, Orthopedic Surgery, . [address]      2. Lumbar spondylosis  M47.816 Injection Authorization - Brianna Sotelo MD, Orthopedic Surgery, . [address]      3. Disc degeneration, lumbar  M51.36 Injection Authorization - Brianna Sotelo MD, Orthopedic Surgery, . [address]      4.  Lumbar radiculopathy  M54.16 Injection Authorization - Brianna Sotelo MD, Orthopedic Surgery, [facility]       5. Spinal stenosis of lumbar region with neurogenic claudication  M48.062 HYDROcodone-acetaminophen (NORCO) 5-325 MG per tablet     Injection Authorization - Spine     [address] MD, Orthopedic Surgery, [facility]           Orders Placed This Encounter   Procedures    XR HIP 2-3 VW W PELVIS RIGHT     Standing Status:   Future     Number of Occurrences:   1     Standing Expiration Date:   12/13/2023    Injection Authorization - Spine     Referral Priority:   Routine     Number of Visits Requested:   1    [address], MD, Orthopedic Surgery, [facility]      Referral Priority:   Routine     Referral Type:   Eval and Treat     Referral Reason:   Specialty Services Required     Referred to Provider:   Fam Garcia MD     Requested Specialty:   Orthopedic Surgery     Number of Visits Requested:   1        4   This is a chronic illness/condition with exacerbation and progression  Treatment at this time: Prescription Drug Management. The pain is very severe. He is already on gabapentin and Cymbalta. He is not a candidate for NSAIDs because he is on Effient. We will add hydrocodone for somatic pain relief. He was given a prescription for that and counseled regarding the possibility of risks, complications, and alternative treatment options from that medication. Given the severity of his pain, he will be referred for surgical consultation. Discussed Injection therapy at length today, including risks, complications and alternative treatment options. The patient wishes to proceed. The injection will be performed as right L2-3 and right L3-4 transforaminal epidural steroid injections.     Studies ordered today: none      Pavithra Jacob MD  12/13/2022,  5:26 PM

## 2022-12-12 NOTE — PROGRESS NOTES
[address] [place] [address] Court, [address] [facility]  [phone]    Patient Name:  Sixto Platt  YOB: 1965      Office Visit 12/13/2022    CHIEF COMPLAINT:    Chief Complaint   Patient presents with    Sleep Apnea         HISTORY OF PRESENT ILLNESS:  Patient is seen today for follow up of BENITA. Patient states that he was diagnosed with sleep apnea over 20 years ago. He has never been compliant on cpap therapy. He had a HST in 9/21/2020 with AHI 19.6/hr with desaturations to 76%. He was seen as a new patient in June by Dr. Lewis Solano. He was prescribed APAP 5-18 cm. Patient has turned in his machine AMA because the DME company was taking multiple payments from his credit card. He had a recent admission to [facility] 12/5-12/7 with hypoxemia and lower extremity edema. US was negative DVT. CTA chest noted to have stable aneurysm with no PE. He had an DONNA on room air with over 5 min of desaturations. He was started on nocturnal oxygen at 2 lpm with recommendations to follow up outpatient sleep center with a repeat sleep study. He is 15 lbs less now than in 2020. He states that he doesn't sleep well at all. He goes to bed between 7:30-9 pm and falls to sleep easily. He wakes up around 1 am with leg and back pains. He had an appointment earlier today with Dr. Channing Minaya, orthopedics, with recommendations of spinal surgery. He denies restless leg symptoms. He will occasionally go back to bed but usually doesn't fall back to sleep. He will occasionally nap. 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 He is a long-time smoker starting at age 13. He smoked up to 3 ppd for 15-20 years but when his firstborn was delivered, he cut back to one pack a week. He does report occasional wheezing and has occasional shortness of breath. No spirometry to review. He declines referral to pulmonary for evaluation.  He will be given an albuterol inhaler to use if wheezing or short of breath. Past Medical History:   Diagnosis Date    Aneurysm ([facility] [address]     followed by family doctor - not large enough for vascular yet    CAD (coronary artery disease)     mi--2014--- cabg 2014--- followed by dr Susana Pacheco; also had 2 stents post CABG    Chronic pain     lumbar    Depressive disorder, not elsewhere classified     on med    Essential hypertension, benign     controlled with med- affirms would be SOB w 1 flight of steps    GERD (gastroesophageal reflux disease)     controlled with med    Obese     bmi 35.81    Pure hypercholesterolemia     Sleep apnea     dx long ago-- per pt-- never had any follow up- wife says he no longer snores since CABG    Thyroid disease     managed with medications         Patient Active Problem List   Diagnosis    Acute bilateral low back pain without sciatica    Strain of lumbar region    Class 1 obesity due to excess calories without serious comorbidity with body mass index (BMI) of 33.0 to 33.9 in adult    Essential hypertension, benign    Spondylolisthesis at L3-L4 level    Coronary artery disease with stable angina pectoris ([address]    Pure hypercholesterolemia    Obesity (BMI 30.0-34. 9)    Major depressive disorder with single episode, in partial remission (HCC)    Lumbar stenosis with neurogenic claudication    BENITA (obstructive sleep apnea)    Hypersomnia    Bilateral leg edema    Tobacco dependence    Chronic venous stasis    Obesity hypoventilation syndrome [facility])          Past Surgical History:   Procedure Laterality Date    BACK SURGERY      DR Ceci Moreno, L3&4 August 2020    [place] A[facility] Moreno, november 2018    COLONOSCOPY N/A 8/17/2018    COLONOSCOPY/ 32 performed by Ramon Quintana MD at [facility] ENDOSCOPY    COLONOSCOPY FLX DX W/COLLJ [facility] WHEN PFRMD  8/17/2018         KNEE ARTHROSCOPY Right YRS AGO    LUMBAR LAMINECTOMY  08/10/2020    right L3-4 lami with lysis of adhesions Dr. Gaby Taylor  08/2018    [facility]    [place] CARDIAC SURG PROCEDURE UNLIST  2014    CABG     [place] CARDIAC SURG PROCEDURE UNLIST  2015    1 stent           Social History     Socioeconomic History    Marital status:      Spouse name: Not on file    Number of children: Not on file    Years of education: Not on file    Highest education level: Not on file   Occupational History    Not on file   Tobacco Use    Smoking status: Former     Packs/day: 0.50     Types: Cigarettes    Smokeless tobacco: Never    Tobacco comments:     Quit smoking: quit Oct 2018   Substance and Sexual Activity    Alcohol use: Yes    Drug use: No    Sexual activity: Not on file   Other Topics Concern    Not on file   Social History Narrative    Not on file     Social Determinants of Health     Financial Resource Strain: Not on file   Food Insecurity: Not on file   Transportation Needs: Not on file   Physical Activity: Not on file   Stress: Not on file   Social Connections: Not on file   Intimate Partner Violence: Not on file   Housing Stability: Not on file         Family History   Problem Relation Age of Onset    Diabetes Sister         type 2; insulin dep    Hypertension Father     Heart Disease Father         CABG, no MI     Cancer Mother         leukemia    Elevated Lipids Father          No Known Allergies      Current Outpatient Medications   Medication Sig    HYDROcodone-acetaminophen (NORCO) 5-325 MG per tablet Take 1 tablet by mouth 2 times daily as needed for Pain for up to 30 days. amLODIPine (NORVASC) 10 MG tablet Take 1 tablet by mouth daily    furosemide (LASIX) 40 MG tablet Take 1 tablet by mouth daily    DULoxetine (CYMBALTA) 60 MG extended release capsule Take 60 mg by mouth 2 times daily    gabapentin (NEURONTIN) 100 MG capsule Take 300 mg by mouth in the morning and at bedtime.     LORazepam (ATIVAN) 1 MG tablet TAKE 1 TABLET BY MOUTH TWICE A DAY    acetaminophen (TYLENOL) 325 mg tablet Take 650 mg by mouth every 6 hours as needed for Pain    aspirin 81 MG EC tablet Take by mouth daily    atorvastatin (LIPITOR) 80 MG tablet Take 80 mg by mouth daily    Evolocumab 140 MG/ML SOAJ Inject 140 mg into the skin    isosorbide mononitrate (IMDUR) 120 MG extended release tablet Take 120 mg by mouth 2 times daily    lisinopril (PRINIVIL;ZESTRIL) 20 MG tablet Take by mouth daily    metoprolol (LOPRESSOR) 100 MG tablet Take 100 mg by mouth 2 times daily    nitroGLYCERIN (NITROSTAT) 0.4 MG SL tablet Place under the tongue     omeprazole (PRILOSEC OTC) 20 MG tablet Take 20 mg by mouth daily    prasugrel (EFFIENT) 10 MG TABS Take 10 mg by mouth daily    ranolazine (RANEXA) 500 MG extended release tablet Take 500 mg by mouth    desvenlafaxine succinate (PRISTIQ) 100 MG TB24 extended release tablet Take 100 mg by mouth daily  (Patient not taking: Reported on 12/13/2022)     No current facility-administered medications for this visit. REVIEW OF SYSTEMS:   CONSTITUTIONAL: weight loss since 2020 study    There is no history of fever, chills, night sweats, weight gain, persistent fatigue, or lethargy/hypersomnolence. CARDIAC:   No chest pain, pressure, discomfort, palpitations, orthopnea, murmurs, + LE edema intermittently    GI:   No dysphagia, heartburn reflux, nausea/vomiting, diarrhea, abdominal pain, or bleeding. NEURO:   There is no history of AMS, persistent headache, decreased level of consciousness, seizures, or motor or sensory deficits. PHYSICAL EXAM:    Vitals:    12/13/22 1413   BP: 124/62   Pulse: 82   Resp: 15   Temp: 97 °F (36.1 °C)   SpO2: 96%   Weight: 220 lb (99.8 kg)   Height: 5' 9\" (1.753 m)      Body mass index is 32.49 kg/m². GENERAL APPEARANCE:   The patient is obese and in no respiratory distress. HEENT:   PERRL. Conjunctivae unremarkable. Nasal mucosa is without epistaxis, exudate, or polyps. Gums and dentition are unremarkable. There is  oropharyngeal narrowing. TMs are clear.    NECK/LYMPHATIC:   Symmetrical with no elevation of jugular venous pulsation. Trachea midline. No thyroid enlargement. No cervical adenopathy. LUNGS:   Normal respiratory effort with symmetrical lung expansion. Breath sounds diminished but clear, no wheezing . HEART:   There is a regular rate and rhythm. No murmur, rub, or gallop. There is no edema in the lower extremities. ABDOMEN:   Soft and non-tender. No hepatosplenomegaly. Bowel sounds are normal.     NEURO:   The patient is alert and oriented to person, place, and time. Memory appears intact and mood is normal.  No gross sensorimotor deficits are present. ASSESSMENT:  (Medical Decision Making)      Diagnosis Orders   1. BENITA (obstructive sleep apnea)  Ambulatory Referral to Sleep Studies   This is moderate diagnosed in 2020 with hypoxemia. He is prescribed 2 L oxygen to use nocturnally when he was discharged from his recent hospitalization and will continue for now. He wants to discuss with orthopedics if he needs to pursue back surgery and wants to wait for the split night PSG until after his back pain is relieved. We discussed the importance of treating sleep apnea and hypoxemia in detail. The pathophysiology of obstructive sleep apnea was reviewed with the patient. It's potential to promote severe neurologic, cardiac, pulmonary, and gastrointestinal problems was discussed. Specifically, the increased incidence of hypertension, coronary artery disease, congestive heart failure, pulmonary hypertension, gastroesophageal reflux, pathologic hypersomnolence, memory loss, and glucose intolerance was related to the consequences of hypoxemia, hypercapnia, airway obstruction, and sympathetic overdrive. We also discussed the ability of nasal CPAP to correct these abnormalities through maintenance of a patent airway. Therapeutic options including surgery, oral appliances, and weight loss were also reviewed.      2. Class 1 obesity due to excess calories without serious comorbidity with body mass index (BMI) of 33.0 to 33.9 in adult  Ambulatory Referral to Sleep Studies   BMI 32.49   3. Nocturnal hypoxemia  Ambulatory Referral to Sleep Studies   Using 2l oxygen at night since recent hospital discharge        PLAN:  Split night PSG   Patient declines pulmonary referral for evaluation of COPD. Prn albuterol inhaler prescribed. Follow up after study or sooner if needed       Orders Placed This Encounter   Procedures    Ambulatory Referral to Sleep Studies     Referral Priority:   Routine     Referral Type:   Consult for Advice and Opinion     Referral Reason:   Specialty Services Required     Number of Visits Requested:   1       No orders of the defined types were placed in this encounter. Collaborating Physician: Dr. Copeland No    Over 50% of today's office visit was spent in face to face time reviewing test results, prognosis, importance of compliance, education about disease process, benefits of medications, instructions for management of acute flare-ups, and follow up plans. Total face to face time spent with patient was 35 minutes.         MONO Silva CNP  Electronically signed

## 2022-12-13 ENCOUNTER — OFFICE VISIT (OUTPATIENT)
Dept: ORTHOPEDIC SURGERY | Age: 57
End: 2022-12-13

## 2022-12-13 ENCOUNTER — OFFICE VISIT (OUTPATIENT)
Dept: SLEEP MEDICINE | Age: 57
End: 2022-12-13
Payer: COMMERCIAL

## 2022-12-13 VITALS
HEIGHT: 69 IN | BODY MASS INDEX: 32.58 KG/M2 | HEART RATE: 82 BPM | OXYGEN SATURATION: 96 % | RESPIRATION RATE: 15 BRPM | WEIGHT: 220 LBS | DIASTOLIC BLOOD PRESSURE: 62 MMHG | TEMPERATURE: 97 F | SYSTOLIC BLOOD PRESSURE: 124 MMHG

## 2022-12-13 DIAGNOSIS — M51.36 DISC DEGENERATION, LUMBAR: ICD-10-CM

## 2022-12-13 DIAGNOSIS — M48.062 SPINAL STENOSIS OF LUMBAR REGION WITH NEUROGENIC CLAUDICATION: ICD-10-CM

## 2022-12-13 DIAGNOSIS — M54.16 LUMBAR RADICULOPATHY: ICD-10-CM

## 2022-12-13 DIAGNOSIS — E66.09 CLASS 1 OBESITY DUE TO EXCESS CALORIES WITHOUT SERIOUS COMORBIDITY WITH BODY MASS INDEX (BMI) OF 33.0 TO 33.9 IN ADULT: ICD-10-CM

## 2022-12-13 DIAGNOSIS — M47.816 LUMBAR SPONDYLOSIS: ICD-10-CM

## 2022-12-13 DIAGNOSIS — G47.33 OSA (OBSTRUCTIVE SLEEP APNEA): Primary | ICD-10-CM

## 2022-12-13 DIAGNOSIS — M25.551 RIGHT HIP PAIN: Primary | ICD-10-CM

## 2022-12-13 DIAGNOSIS — G47.34 NOCTURNAL HYPOXEMIA: ICD-10-CM

## 2022-12-13 PROCEDURE — 99214 OFFICE O/P EST MOD 30 MIN: CPT | Performed by: NURSE PRACTITIONER

## 2022-12-13 PROCEDURE — 3078F DIAST BP <80 MM HG: CPT | Performed by: NURSE PRACTITIONER

## 2022-12-13 PROCEDURE — 3074F SYST BP LT 130 MM HG: CPT | Performed by: NURSE PRACTITIONER

## 2022-12-13 RX ORDER — HYDROCODONE BITARTRATE AND ACETAMINOPHEN 5; 325 MG/1; MG/1
1 TABLET ORAL 2 TIMES DAILY PRN
Qty: 20 TABLET | Refills: 0 | Status: SHIPPED | OUTPATIENT
Start: 2022-12-13 | End: 2023-01-12

## 2022-12-13 RX ORDER — ALBUTEROL SULFATE 90 UG/1
2 AEROSOL, METERED RESPIRATORY (INHALATION) EVERY 6 HOURS PRN
Qty: 1 EACH | Refills: 11 | Status: SHIPPED | OUTPATIENT
Start: 2022-12-13

## 2022-12-13 ASSESSMENT — SLEEP AND FATIGUE QUESTIONNAIRES
ESS TOTAL SCORE: 12
HOW LIKELY ARE YOU TO NOD OFF OR FALL ASLEEP IN A CAR, WHILE STOPPED FOR A FEW MINUTES IN TRAFFIC: 0
HOW LIKELY ARE YOU TO NOD OFF OR FALL ASLEEP WHILE SITTING AND READING: 3
HOW LIKELY ARE YOU TO NOD OFF OR FALL ASLEEP WHILE SITTING AND TALKING TO SOMEONE: 0
HOW LIKELY ARE YOU TO NOD OFF OR FALL ASLEEP WHEN YOU ARE A PASSENGER IN A CAR FOR AN HOUR WITHOUT A BREAK: 3
HOW LIKELY ARE YOU TO NOD OFF OR FALL ASLEEP WHILE WATCHING TV: 3
HOW LIKELY ARE YOU TO NOD OFF OR FALL ASLEEP WHILE SITTING QUIETLY AFTER LUNCH WITHOUT ALCOHOL: 0
HOW LIKELY ARE YOU TO NOD OFF OR FALL ASLEEP WHILE SITTING INACTIVE IN A PUBLIC PLACE: 0
HOW LIKELY ARE YOU TO NOD OFF OR FALL ASLEEP WHILE LYING DOWN TO REST IN THE AFTERNOON WHEN CIRCUMSTANCES PERMIT: 3

## 2022-12-13 NOTE — PATIENT INSTRUCTIONS
You will get a call from [facility] to schedule your in-lab PSG. Once the test is completed, a physician will read the study. You will be contacted from our office to discuss results, get started on PAP therapy, or schedule a follow up visit to discuss treatment options.

## 2022-12-13 NOTE — LETTER
Gary Theodore Trombini  1965  ______________________________________________________________________    Radiographic Studies:    Cervical MRI/ Contrast        Thoracic MRI/ Contrast        Lumbar MRI/ Contrast    CT Myelogram __________________________________________________    NCS/EMG______________________________________________________    MRI of ________________________________________________________    Other__________________________________________________________      Injections:    _______________________________________________________________    Authorization to stop blood thinners________________________________      Medications:    Oral steroids___________________    Muscle Relaxers___________________    Pain medications_____________________    NSAIDS_____________________    Neuropathic pain medication_________________________________________      Physical Therapy:    Lumbar     Thoracic     Cervical       Other_______________________________      Follow up/ Referral:    Pain referral_______________________________________________________    Referral___________________________________________________________    Follow up_________________________________________________________    Handicapped Parking_______________________________________________    Other_____________________________________________________________

## 2022-12-15 ENCOUNTER — CARE COORDINATION (OUTPATIENT)
Dept: CARE COORDINATION | Facility: CLINIC | Age: 57
End: 2022-12-15

## 2022-12-15 NOTE — CARE COORDINATION
Care Transitions Outreach Attempt    Call within 2 business days of discharge: Yes   Attempted to reach patient for transitions of care follow up. Unable to reach patient. Will reopen episode with return call. Patient: Cyndi Bob Patient : 1965 MRN: [id number]    Last Discharge  Street       Date Complaint Diagnosis Description Type Department Provider    22 Leg Swelling; Shortness of Breath Bilateral leg edema . .. ED to Hosp-Admission (Discharged) (ADMITTED) Linnea Philip MD; Yany Grant. .. Was this an external facility discharge?  No Discharge Facility: [facility]    Noted following upcoming appointments from discharge chart review:   [facility] follow up appointment(s):   Future Appointments   Date Time Provider Sarai Johns   2022  1:45 PM Madonna Rice MD [facility] AMB     Non-[facility] follow up appointment(s): n/a

## 2022-12-19 ENCOUNTER — OFFICE VISIT (OUTPATIENT)
Dept: ORTHOPEDIC SURGERY | Age: 57
End: 2022-12-19

## 2022-12-19 DIAGNOSIS — M54.16 LUMBAR RADICULOPATHY: ICD-10-CM

## 2022-12-19 DIAGNOSIS — M51.36 DISC DEGENERATION, LUMBAR: ICD-10-CM

## 2022-12-19 DIAGNOSIS — M47.816 LUMBAR FACET ARTHROPATHY: ICD-10-CM

## 2022-12-19 DIAGNOSIS — M47.816 LUMBAR SPONDYLOSIS: Primary | ICD-10-CM

## 2022-12-19 DIAGNOSIS — M48.062 SPINAL STENOSIS OF LUMBAR REGION WITH NEUROGENIC CLAUDICATION: ICD-10-CM

## 2022-12-19 RX ORDER — METHYLPREDNISOLONE ACETATE 80 MG/ML
80 INJECTION, SUSPENSION INTRA-ARTICULAR; INTRALESIONAL; INTRAMUSCULAR; SOFT TISSUE ONCE
Status: COMPLETED | OUTPATIENT
Start: 2022-12-19 | End: 2022-12-19

## 2022-12-19 RX ADMIN — METHYLPREDNISOLONE ACETATE 80 MG: 80 INJECTION, SUSPENSION INTRA-ARTICULAR; INTRALESIONAL; INTRAMUSCULAR; SOFT TISSUE at 13:44

## 2022-12-19 NOTE — PROGRESS NOTES
Name: Mervat Steward  YOB: 1965  Gender: male  MRN: [id number]        Transforaminal SRINIVAS Procedure Note    Procedure: Right  L2-L3 and L3-L4 transforaminal epidural steroid injections     Precautions: Mervat Steward denies prior sensitivity to steroid, local anesthetic, iodine, or shellfish. Consent:  Consent was obtained prior to the procedure. The procedure was discussed at length with Mervat Steward. He was given the opportunity to ask questions regarding the procedure and its associated risks. In addition to the potential risks associated with the procedure itself, the patient was informed both verbally and in writing of potential side effects of the use glucocorticoids. The patient appeared to comprehend the informed consent and desired to have the procedure performed, and informed consent was signed. He was placed in a prone position on the fluoroscopy table and the skin was prepped and draped in a routine sterile fashion. The areas to be injected were each anesthetized with 1 ml of 1% Lidocaine. A 22 gauge 3.5 inch spinal needle was carefully advanced under fluoroscopic guidance to the right L3-L4 transforaminal space  0.5 ml of 70% of Omnipaque was injected to confirm proper needle placement and absence of subdural or vascular flow Once proper placement was confirmed, 0.5ml of 0.25 marcaine and 0.5 mL of 80 mg/mL depomedrol were injected through the spinal needle. The above procedure was then repeated at the Right  L2-L3 transforaminal space using 0.5 mL of 80 mg/mL depomedrol and a 25 gauge spinal needle. Fluoroscopic guidance was used intermittently over a 10-minute period to insure proper needle placement and his safety. A hard copy of the fluoroscopic image has been placed in his chart and is saved on the C-arm hard drive.  He was monitored for 30 minutes after the procedure and discharged home in a stable fashion with a routine follow up.    Procedural Diagnosis:     ICD-10-CM    1. Lumbar spondylosis  M47.816 FL NERVE BLOCK LUMBOSACRAL 1ST     FL NERVE BLOCK LUMBOSACRAL EACH ADD     methylPREDNISolone acetate (DEPO-MEDROL) injection 80 mg      2. Disc degeneration, lumbar  M51.36 FL NERVE BLOCK LUMBOSACRAL 1ST     FL NERVE BLOCK LUMBOSACRAL EACH ADD     methylPREDNISolone acetate (DEPO-MEDROL) injection 80 mg      3. Lumbar radiculopathy  M54.16 FL NERVE BLOCK LUMBOSACRAL 1ST     FL NERVE BLOCK LUMBOSACRAL EACH ADD     methylPREDNISolone acetate (DEPO-MEDROL) injection 80 mg      4. Spinal stenosis of lumbar region with neurogenic claudication  M48.062 FL NERVE BLOCK LUMBOSACRAL 1ST     FL NERVE BLOCK LUMBOSACRAL EACH ADD     methylPREDNISolone acetate (DEPO-MEDROL) injection 80 mg      5.  Lumbar facet arthropathy  M47.816 FL NERVE BLOCK LUMBOSACRAL 1ST     FL NERVE BLOCK LUMBOSACRAL EACH ADD     methylPREDNISolone acetate (DEPO-MEDROL) injection 80 mg         Jackson Cruz MD  12/19/22

## 2023-01-30 ENCOUNTER — OFFICE VISIT (OUTPATIENT)
Dept: FAMILY MEDICINE CLINIC | Facility: CLINIC | Age: 58
End: 2023-01-30
Payer: COMMERCIAL

## 2023-01-30 VITALS
SYSTOLIC BLOOD PRESSURE: 138 MMHG | WEIGHT: 215 LBS | BODY MASS INDEX: 31.84 KG/M2 | DIASTOLIC BLOOD PRESSURE: 70 MMHG | HEIGHT: 69 IN

## 2023-01-30 DIAGNOSIS — I10 ESSENTIAL HYPERTENSION, BENIGN: ICD-10-CM

## 2023-01-30 DIAGNOSIS — E83.42 HYPOMAGNESEMIA: ICD-10-CM

## 2023-01-30 DIAGNOSIS — F32.4 MAJOR DEPRESSIVE DISORDER WITH SINGLE EPISODE, IN PARTIAL REMISSION (HCC): ICD-10-CM

## 2023-01-30 DIAGNOSIS — R73.03 PREDIABETES: ICD-10-CM

## 2023-01-30 DIAGNOSIS — I25.118 CORONARY ARTERY DISEASE OF NATIVE ARTERY OF NATIVE HEART WITH STABLE ANGINA PECTORIS (HCC): ICD-10-CM

## 2023-01-30 DIAGNOSIS — E66.9 OBESITY (BMI 30.0-34.9): ICD-10-CM

## 2023-01-30 DIAGNOSIS — R06.09 DYSPNEA ON EXERTION: ICD-10-CM

## 2023-01-30 DIAGNOSIS — G47.33 OSA (OBSTRUCTIVE SLEEP APNEA): ICD-10-CM

## 2023-01-30 DIAGNOSIS — R60.9 EDEMA, UNSPECIFIED TYPE: Primary | ICD-10-CM

## 2023-01-30 DIAGNOSIS — R53.83 FATIGUE, UNSPECIFIED TYPE: ICD-10-CM

## 2023-01-30 LAB
BASOPHILS # BLD: 0.1 K/UL (ref 0–0.2)
BASOPHILS NFR BLD: 1 % (ref 0–2)
DIFFERENTIAL METHOD BLD: ABNORMAL
EOSINOPHIL # BLD: 0.1 K/UL (ref 0–0.8)
EOSINOPHIL NFR BLD: 1 % (ref 0.5–7.8)
ERYTHROCYTE [DISTWIDTH] IN BLOOD BY AUTOMATED COUNT: 14.7 % (ref 11.9–14.6)
HCT VFR BLD AUTO: 42.8 % (ref 41.1–50.3)
HGB BLD-MCNC: 13.8 G/DL (ref 13.6–17.2)
IMM GRANULOCYTES # BLD AUTO: 0.1 K/UL (ref 0–0.5)
IMM GRANULOCYTES NFR BLD AUTO: 1 % (ref 0–5)
LYMPHOCYTES # BLD: 3 K/UL (ref 0.5–4.6)
LYMPHOCYTES NFR BLD: 31 % (ref 13–44)
MCH RBC QN AUTO: 31.7 PG (ref 26.1–32.9)
MCHC RBC AUTO-ENTMCNC: 32.2 G/DL (ref 31.4–35)
MCV RBC AUTO: 98.4 FL (ref 82–102)
MONOCYTES # BLD: 1 K/UL (ref 0.1–1.3)
MONOCYTES NFR BLD: 11 % (ref 4–12)
NEUTS SEG # BLD: 5.3 K/UL (ref 1.7–8.2)
NEUTS SEG NFR BLD: 55 % (ref 43–78)
NRBC # BLD: 0 K/UL (ref 0–0.2)
PLATELET # BLD AUTO: 350 K/UL (ref 150–450)
PMV BLD AUTO: 9.9 FL (ref 9.4–12.3)
RBC # BLD AUTO: 4.35 M/UL (ref 4.23–5.6)
WBC # BLD AUTO: 9.5 K/UL (ref 4.3–11.1)

## 2023-01-30 PROCEDURE — 3075F SYST BP GE 130 - 139MM HG: CPT | Performed by: FAMILY MEDICINE

## 2023-01-30 PROCEDURE — 99214 OFFICE O/P EST MOD 30 MIN: CPT | Performed by: FAMILY MEDICINE

## 2023-01-30 PROCEDURE — 3078F DIAST BP <80 MM HG: CPT | Performed by: FAMILY MEDICINE

## 2023-01-30 RX ORDER — GABAPENTIN 800 MG/1
TABLET ORAL
COMMUNITY
Start: 2023-01-11

## 2023-01-30 ASSESSMENT — PATIENT HEALTH QUESTIONNAIRE - PHQ9
SUM OF ALL RESPONSES TO PHQ QUESTIONS 1-9: 0
1. LITTLE INTEREST OR PLEASURE IN DOING THINGS: 0
2. FEELING DOWN, DEPRESSED OR HOPELESS: 0
SUM OF ALL RESPONSES TO PHQ QUESTIONS 1-9: 0
SUM OF ALL RESPONSES TO PHQ9 QUESTIONS 1 & 2: 0
SUM OF ALL RESPONSES TO PHQ QUESTIONS 1-9: 0
SUM OF ALL RESPONSES TO PHQ QUESTIONS 1-9: 0

## 2023-01-30 ASSESSMENT — ENCOUNTER SYMPTOMS
EYES NEGATIVE: 1
RESPIRATORY NEGATIVE: 1
EYE DISCHARGE: 0
ABDOMINAL PAIN: 0
EYE ITCHING: 0
CHANGE IN BOWEL HABIT: 0
EYE PAIN: 0

## 2023-01-30 NOTE — PROGRESS NOTES
[address] [place] [address]  _______________________________________  Ana Cristina Shepherd MD                 [address] [place] [address] Drive, [address]. [place], [address] [place] [address]                     Sabrina Bradshaw 2                                                                                    Phone: [phone]                                                                                    Fax: [phone]    Violet Landeros is a 59 y.o. male who is seen for evaluation of   Chief Complaint   Patient presents with    Swelling    Follow-up     ER follow up bilateral foot swelling       HPI:   Swelling  Episode onset: bilat leg/foot swelling, also went to ER and they started lasix last week. Pertinent negatives include no abdominal pain, anorexia, change in bowel habit, chest pain, chills, fatigue, fever, joint swelling or myalgias. Heart Problem  Episode onset: chronic CAD, on meds, no recent flare noted, needs refills. Pertinent negatives include no abdominal pain, anorexia, change in bowel habit, chest pain, chills, fatigue, fever, joint swelling or myalgias. Hypertension  Episode onset: on meds, need refills and labs. Pertinent negatives include no chest pain. Abnormal Lab  Episode onset: low potassium and mag inp ast, need rechedck. Pertinent negatives include no abdominal pain, anorexia, change in bowel habit, chest pain, chills, fatigue, fever, joint swelling or myalgias. Chief Complaint   Patient presents with    Swelling    Follow-up     ER follow up bilateral foot swelling         Review of Systems:    Review of Systems   Constitutional: Negative. Negative for chills, fatigue and fever. HENT: Negative. Eyes: Negative. Negative for pain, discharge and itching. Respiratory: Negative. Cardiovascular:  Negative for chest pain. Gastrointestinal:  Negative for abdominal pain, anorexia and change in bowel habit. Endocrine: Negative. Genitourinary: Negative.     Musculoskeletal: Negative for joint swelling and myalgias.      History:  Past Medical History:   Diagnosis Date    Aneurysm [facility])     followed by family doctor - not large enough for vascular yet    CAD (coronary artery disease)     mi--2014--- cabg 2014--- followed by dr Sarah Mills; also had 2 stents post CABG    Chronic pain     lumbar    Depressive disorder, not elsewhere classified     on med    Essential hypertension, benign     controlled with med- affirms would be SOB w 1 flight of steps    GERD (gastroesophageal reflux disease)     controlled with med    Obese     bmi 35.81    Pure hypercholesterolemia     Sleep apnea     dx long ago-- per pt-- never had any follow up- wife says he no longer snores since CABG    Thyroid disease     managed with medications       Past Surgical History:   Procedure Laterality Date   [facility] SURGERY      DR Yoli Quintana, L3&4 August 2020   Evangelina Quintana, november 2018    COLONOSCOPY N/A 8/17/2018    COLONOSCOPY/ 32 performed by Shaheen Starkey MD at [facility] ENDOSCOPY    COLONOSCOPY FLX DX W/COLLJ SPEC WHEN PFRMD  8/17/2018         KNEE ARTHROSCOPY Right YRS AGO    LUMBAR LAMINECTOMY  08/10/2020    right L3-4 lami with lysis of adhesions Dr. Andrei Ray  08/2018    smitha Rosales  2014    CABG     PA UNLISTED PROCEDURE CARDIAC SURGERY  2015    1 stent       Family History   Problem Relation Age of Onset    Diabetes Sister         type 2; insulin dep    Hypertension Father     Heart Disease Father         CABG, no MI     Cancer Mother         leukemia    Elevated Lipids Father        Social History     Tobacco Use    Smoking status: Former     Packs/day: 0.50     Types: Cigarettes    Smokeless tobacco: Never    Tobacco comments:     Quit smoking: quit Oct 2018   Substance Use Topics    Alcohol use: Yes       No Known Allergies    Current Outpatient Medications   Medication Sig Dispense Refill   • gabapentin (NEURONTIN) 800 MG tablet TAKE 1 TABLET BY MOUTH THREE TIMES A DAY     • albuterol sulfate HFA (PROVENTIL;VENTOLIN;PROAIR) 108 (90 Base) MCG/ACT inhaler Inhale 2 puffs into the lungs every 6 hours as needed for Wheezing or Shortness of Breath 1 each 11   • amLODIPine (NORVASC) 10 MG tablet Take 1 tablet by mouth daily 30 tablet 3   • furosemide (LASIX) 40 MG tablet Take 1 tablet by mouth daily 60 tablet 3   • DULoxetine (CYMBALTA) 60 MG extended release capsule Take 60 mg by mouth 2 times daily     • LORazepam (ATIVAN) 1 MG tablet TAKE 1 TABLET BY MOUTH TWICE A DAY     • acetaminophen (TYLENOL) 325 mg tablet Take 650 mg by mouth every 6 hours as needed for Pain     • aspirin 81 MG EC tablet Take by mouth daily     • atorvastatin (LIPITOR) 80 MG tablet Take 80 mg by mouth daily     • Evolocumab 140 MG/ML SOAJ Inject 140 mg into the skin     • isosorbide mononitrate (IMDUR) 120 MG extended release tablet Take 120 mg by mouth 2 times daily     • lisinopril (PRINIVIL;ZESTRIL) 20 MG tablet Take by mouth daily     • metoprolol (LOPRESSOR) 100 MG tablet Take 100 mg by mouth 2 times daily     • nitroGLYCERIN (NITROSTAT) 0.4 MG SL tablet Place under the tongue      • prasugrel (EFFIENT) 10 MG TABS Take 10 mg by mouth daily     • ranolazine (RANEXA) 500 MG extended release tablet Take 500 mg by mouth     • gabapentin (NEURONTIN) 100 MG capsule Take 300 mg by mouth in the morning and at bedtime. (Patient not taking: Reported on 1/30/2023)     • desvenlafaxine succinate (PRISTIQ) 100 MG TB24 extended release tablet Take 100 mg by mouth daily (Patient not taking: Reported on 1/30/2023)     • omeprazole (PRILOSEC OTC) 20 MG tablet Take 20 mg by mouth daily (Patient not taking: Reported on 1/30/2023)       No current facility-administered medications for this visit.       Vitals:    /70   Ht 5' 9\" (1.753 m)   Wt 215 lb (97.5 kg)   BMI 31.75 kg/m²     Physical Exam:  Physical Exam  Vitals and nursing  note reviewed.   Constitutional:       Appearance: Normal appearance. He is obese. He is not ill-appearing or diaphoretic.   HENT:      Head: Normocephalic and atraumatic.      Nose: Nose normal.   Eyes:      Pupils: Pupils are equal, round, and reactive to light.   Cardiovascular:      Rate and Rhythm: Normal rate and regular rhythm.      Pulses: Normal pulses.      Heart sounds: Normal heart sounds.   Pulmonary:      Effort: Pulmonary effort is normal.      Breath sounds: Normal breath sounds.   Musculoskeletal:         General: Swelling present. No tenderness. Normal range of motion.      Cervical back: Normal range of motion and neck supple.      Comments: Bilat trace swelling noted   Skin:     General: Skin is warm and dry.      Findings: No erythema or rash.   Neurological:      General: No focal deficit present.      Mental Status: He is alert and oriented to person, place, and time. Mental status is at baseline.   Psychiatric:         Mood and Affect: Mood normal.     Assessment/Plan:     ICD-10-CM    1. Edema, unspecified type  R60.9       2. Major depressive disorder with single episode, in partial remission ([facility])  F32.4       3. Coronary artery disease of native artery of native heart with stable angina pectoris ([facility])  I25.118       4. Fatigue, unspecified type  R53.83       5. Dyspnea on exertion  R06.09       6. BENITA (obstructive sleep apnea)  G47.33       7. Obesity (BMI 30.0-34.9)  E66.9       8. Prediabetes  R73.03 Hemoglobin A1C      9. Hypomagnesemia  E83.42 Magnesium      10. Essential hypertension, benign  I10 CBC with Auto Differential     Comprehensive Metabolic Panel        Labs and medicines sent and pending as appropriate  Encourage low salt diet with exercise as tolerated  Encourage weight control efforts to reduce overall health risks in future  Encourage monitor BP at home   Recheck in 3 months or as needed for other problems.     Chavez Almonte MD

## 2023-01-31 LAB
ALBUMIN SERPL-MCNC: 4 G/DL (ref 3.5–5)
ALBUMIN/GLOB SERPL: 1.2 (ref 0.4–1.6)
ALP SERPL-CCNC: 37 U/L (ref 50–136)
ALT SERPL-CCNC: 21 U/L (ref 12–65)
ANION GAP SERPL CALC-SCNC: 5 MMOL/L (ref 2–11)
AST SERPL-CCNC: 30 U/L (ref 15–37)
BILIRUB SERPL-MCNC: 0.2 MG/DL (ref 0.2–1.1)
BUN SERPL-MCNC: 16 MG/DL (ref 6–23)
CALCIUM SERPL-MCNC: 9.9 MG/DL (ref 8.3–10.4)
CHLORIDE SERPL-SCNC: 112 MMOL/L (ref 101–110)
CO2 SERPL-SCNC: 25 MMOL/L (ref 21–32)
CREAT SERPL-MCNC: 1.2 MG/DL (ref 0.8–1.5)
EST. AVERAGE GLUCOSE BLD GHB EST-MCNC: 108 MG/DL
GLOBULIN SER CALC-MCNC: 3.3 G/DL (ref 2.8–4.5)
GLUCOSE SERPL-MCNC: 87 MG/DL (ref 65–100)
HBA1C MFR BLD: 5.4 % (ref 4.8–5.6)
MAGNESIUM SERPL-MCNC: 2.1 MG/DL (ref 1.8–2.4)
POTASSIUM SERPL-SCNC: 4.5 MMOL/L (ref 3.5–5.1)
PROT SERPL-MCNC: 7.3 G/DL (ref 6.3–8.2)
SODIUM SERPL-SCNC: 142 MMOL/L (ref 133–143)

## 2023-02-13 ENCOUNTER — OFFICE VISIT (OUTPATIENT)
Dept: ORTHOPEDIC SURGERY | Age: 58
End: 2023-02-13
Payer: COMMERCIAL

## 2023-02-13 VITALS — HEIGHT: 66 IN | BODY MASS INDEX: 35.84 KG/M2 | WEIGHT: 223 LBS

## 2023-02-13 DIAGNOSIS — M51.26 LUMBAR DISC HERNIATION: ICD-10-CM

## 2023-02-13 DIAGNOSIS — Z98.1 HISTORY OF LUMBAR SPINAL FUSION: ICD-10-CM

## 2023-02-13 DIAGNOSIS — M43.16 SPONDYLOLISTHESIS OF LUMBAR REGION: ICD-10-CM

## 2023-02-13 DIAGNOSIS — M48.062 SPINAL STENOSIS OF LUMBAR REGION WITH NEUROGENIC CLAUDICATION: Primary | ICD-10-CM

## 2023-02-13 PROCEDURE — 99214 OFFICE O/P EST MOD 30 MIN: CPT | Performed by: ORTHOPAEDIC SURGERY

## 2023-02-13 NOTE — PROGRESS NOTES
Name: Oksana Beaver  YOB: 1965  Gender: male  MRN: [id number]  Age: 59 y.o. Chief complaint: Back and buttock pain with activities. History of present illness: This is a very pleasant 59 y.o. old male who presents with a 3-year history of low back pain with episodic radiation to the buttocks and lower extremities,  on the right  side. The onset of the symptoms was rather insidious. The patient describes the quality of the pain as a deep ache. The patient has noticed a progressive decrease in his  ability to walk or stand for any extended period of time. His  walking and standing pain is usually relieved with sitting. He  has noted that pushing a cart in the store seems to help. He denies any change in bowel or bladder function since the onset of the symptoms. This patient has had lumbar surgery in the past including an L3-L4 TLIF by Dr. Peter Brewer.        Medications:       Current Outpatient Medications:     gabapentin (NEURONTIN) 800 MG tablet, TAKE 1 TABLET BY MOUTH THREE TIMES A DAY, Disp: , Rfl:     albuterol sulfate HFA (PROVENTIL;VENTOLIN;PROAIR) 108 (90 Base) MCG/ACT inhaler, Inhale 2 puffs into the lungs every 6 hours as needed for Wheezing or Shortness of Breath, Disp: 1 each, Rfl: 11    amLODIPine (NORVASC) 10 MG tablet, Take 1 tablet by mouth daily, Disp: 30 tablet, Rfl: 3    furosemide (LASIX) 40 MG tablet, Take 1 tablet by mouth daily, Disp: 60 tablet, Rfl: 3    DULoxetine (CYMBALTA) 60 MG extended release capsule, Take 60 mg by mouth 2 times daily, Disp: , Rfl:     LORazepam (ATIVAN) 1 MG tablet, TAKE 1 TABLET BY MOUTH TWICE A DAY, Disp: , Rfl:     acetaminophen (TYLENOL) 325 mg tablet, Take 650 mg by mouth every 6 hours as needed for Pain, Disp: , Rfl:     aspirin 81 MG EC tablet, Take by mouth daily, Disp: , Rfl:     atorvastatin (LIPITOR) 80 MG tablet, Take 80 mg by mouth daily, Disp: , Rfl:     Evolocumab 140 MG/ML SOAJ, Inject 140 mg into the skin, Disp: , Rfl: isosorbide mononitrate (IMDUR) 120 MG extended release tablet, Take 120 mg by mouth 2 times daily, Disp: , Rfl:     lisinopril (PRINIVIL;ZESTRIL) 20 MG tablet, Take by mouth daily, Disp: , Rfl:     metoprolol (LOPRESSOR) 100 MG tablet, Take 100 mg by mouth 2 times daily, Disp: , Rfl:     nitroGLYCERIN (NITROSTAT) 0.4 MG SL tablet, Place under the tongue , Disp: , Rfl:     prasugrel (EFFIENT) 10 MG TABS, Take 10 mg by mouth daily, Disp: , Rfl:     ranolazine (RANEXA) 500 MG extended release tablet, Take 500 mg by mouth, Disp: , Rfl:     Allergies:    No Known Allergies      Physical Exam:     This is a well developed well nourished male adult in no acute distress. Mood and affect are appropriate. Oriented to person, place, and time. Respirations are unlabored and there is no evidence of cyanosis    The patient ambulates with a mildly antalgic gait and crouched posture. There is subjective tingling over the right anterior thigh and groin. Reflexes   Right Left   Quadriceps (L4) 2 2   Achilles (S1) 2 2     Strength testing in the lower extremity reveals the following based on the 5 point grading scale:     HF (L2) H Ab (L5) KE (L3/4) ADF (L4) EHL (L5) A Ev (S1) APF (S1)   Right 3 5 5 5 5 5 5   Left 5 5 5 5 5 5 5        Radiographic Studies:     MRI of the lumbar spine images were reviewed and interpreted: She has prior fusion with instrumentation at L3-L4. She is noted to have spondylolisthesis at L4-L5 with bilateral lateral recess stenosis. She also has central stenosis at L2-L3. There is also a right-sided L1-L2 disc herniation with caudal migration. Diagnosis:      ICD-10-CM    1. Spinal stenosis of lumbar region with neurogenic claudication  M48.062       2. Spondylolisthesis of lumbar region  M43.16       3. Lumbar disc herniation  M51.26       4. History of lumbar spinal fusion  Z98.1           Assessment/Plan:   This patient's clinical history and physical exam is consistent with neurogenic claudication and radiculopathy which is likely due to lumbar stenosis with spondylolisthesis and also a disc herniation all adjacent to a previous lumbar laminectomy and fusion. She has exhausted conservative efforts and would like to proceed with surgical options. We discussed the details of surgery including a midline incision in over the low back followed by dissection to the area of stenosis. The nerves would be freed up by trimming any impinging structures including ligaments and bone. Then any segments that are deemed to be unstable will be fused together with cadaver bone and screws and rods will supplement the fusion. A drain may be inserted and the wound would be closed with suture and covered with sterile dressings. The patient would expect to stay in the hospital 1-2 days or until he can get about safely with minimal assistance. A short stay in a rehabilitation facility could also be considered depending on how quickly he recovers. Follow-up would be scheduled for 2-3 weeks and he would have restrictions including no driving, and no lifting greater than 15 lbs until follow up with me. He was encouraged to walk as much as possible before and after the operation to facilitate an expeditious recovery. We also discussed the potential risks of the surgery including, but not limited to infection, spinal fluid leak and potential headaches requiring him to remain supine post-operatively; injury to the cauda equina or peripheral nerve root resulting in weakness, numbness, or very rarely bowel or bladder dysfunction; persistent back or leg symptoms, recurrence of stenosis or the development of instability or hardware failure possibly needing additional surgery;  blood loss needing transfusion; postoperative hematoma; and the risks of anesthesia. The patient voiced an understanding of these issues as outlined.   The procedure that may prove to be beneficial here is a L2-L3 and L4-L5 laminectomy and fusion with allograft, transforaminal lumbar interbody fusion, and instrumentation L2-L5, and a right L1-L2 discectomy. We will need the removal set. The patient is also on anticoagulant therapy under the direction of Dr. Mika Patterson his cardiologist and will need to get clearance for him to come off of it.         Electronically Signed By Ashley Hadley MD     11:19 AM

## 2023-02-27 ENCOUNTER — PREP FOR PROCEDURE (OUTPATIENT)
Dept: ORTHOPEDIC SURGERY | Age: 58
End: 2023-02-27

## 2023-02-27 DIAGNOSIS — M51.26 LUMBAR DISC HERNIATION: ICD-10-CM

## 2023-02-27 DIAGNOSIS — M43.16 SPONDYLOLISTHESIS OF LUMBAR REGION: ICD-10-CM

## 2023-02-27 DIAGNOSIS — M48.062 SPINAL STENOSIS OF LUMBAR REGION WITH NEUROGENIC CLAUDICATION: Primary | ICD-10-CM

## 2023-02-27 DIAGNOSIS — Z98.1 HISTORY OF LUMBAR SPINAL FUSION: ICD-10-CM

## 2023-03-14 ENCOUNTER — TELEPHONE (OUTPATIENT)
Dept: ORTHOPEDIC SURGERY | Age: 58
End: 2023-03-14

## 2023-03-14 NOTE — TELEPHONE ENCOUNTER
Sent cancellation request through case messages.  Patient called to cancel surgery that was scheduled for 3/22

## 2023-05-08 ENCOUNTER — OFFICE VISIT (OUTPATIENT)
Dept: FAMILY MEDICINE CLINIC | Facility: CLINIC | Age: 58
End: 2023-05-08

## 2023-05-08 VITALS
BODY MASS INDEX: 33.18 KG/M2 | WEIGHT: 224 LBS | HEIGHT: 69 IN | SYSTOLIC BLOOD PRESSURE: 132 MMHG | DIASTOLIC BLOOD PRESSURE: 70 MMHG

## 2023-05-08 DIAGNOSIS — G47.33 OSA (OBSTRUCTIVE SLEEP APNEA): ICD-10-CM

## 2023-05-08 DIAGNOSIS — E78.00 PURE HYPERCHOLESTEROLEMIA: ICD-10-CM

## 2023-05-08 DIAGNOSIS — I25.118 CORONARY ARTERY DISEASE OF NATIVE ARTERY OF NATIVE HEART WITH STABLE ANGINA PECTORIS (HCC): ICD-10-CM

## 2023-05-08 DIAGNOSIS — M43.16 SPONDYLOLISTHESIS AT L3-L4 LEVEL: ICD-10-CM

## 2023-05-08 DIAGNOSIS — E66.9 OBESITY (BMI 30.0-34.9): ICD-10-CM

## 2023-05-08 DIAGNOSIS — I10 ESSENTIAL HYPERTENSION, BENIGN: ICD-10-CM

## 2023-05-08 DIAGNOSIS — I71.40 ABDOMINAL AORTIC ANEURYSM (AAA) WITHOUT RUPTURE, UNSPECIFIED PART (HCC): ICD-10-CM

## 2023-05-08 DIAGNOSIS — R73.03 PREDIABETES: ICD-10-CM

## 2023-05-08 DIAGNOSIS — F32.4 MAJOR DEPRESSIVE DISORDER WITH SINGLE EPISODE, IN PARTIAL REMISSION (HCC): Primary | ICD-10-CM

## 2023-05-08 LAB
ALBUMIN SERPL-MCNC: 3.8 G/DL (ref 3.5–5)
ALBUMIN/GLOB SERPL: 1.2 (ref 0.4–1.6)
ALP SERPL-CCNC: 48 U/L (ref 50–136)
ALT SERPL-CCNC: 20 U/L (ref 12–65)
ANION GAP SERPL CALC-SCNC: 2 MMOL/L (ref 2–11)
AST SERPL-CCNC: 18 U/L (ref 15–37)
BASOPHILS # BLD: 0.1 K/UL (ref 0–0.2)
BASOPHILS NFR BLD: 1 % (ref 0–2)
BILIRUB SERPL-MCNC: 0.2 MG/DL (ref 0.2–1.1)
BUN SERPL-MCNC: 19 MG/DL (ref 6–23)
CALCIUM SERPL-MCNC: 9.9 MG/DL (ref 8.3–10.4)
CHLORIDE SERPL-SCNC: 107 MMOL/L (ref 101–110)
CHOLEST SERPL-MCNC: 154 MG/DL
CO2 SERPL-SCNC: 31 MMOL/L (ref 21–32)
CREAT SERPL-MCNC: 1.1 MG/DL (ref 0.8–1.5)
DIFFERENTIAL METHOD BLD: ABNORMAL
EOSINOPHIL # BLD: 0.2 K/UL (ref 0–0.8)
EOSINOPHIL NFR BLD: 2 % (ref 0.5–7.8)
ERYTHROCYTE [DISTWIDTH] IN BLOOD BY AUTOMATED COUNT: 14.6 % (ref 11.9–14.6)
EST. AVERAGE GLUCOSE BLD GHB EST-MCNC: 108 MG/DL
GLOBULIN SER CALC-MCNC: 3.3 G/DL (ref 2.8–4.5)
GLUCOSE SERPL-MCNC: 95 MG/DL (ref 65–100)
HBA1C MFR BLD: 5.4 % (ref 4.8–5.6)
HCT VFR BLD AUTO: 41.8 % (ref 41.1–50.3)
HDLC SERPL-MCNC: 43 MG/DL (ref 40–60)
HDLC SERPL: 3.6
HGB BLD-MCNC: 13.4 G/DL (ref 13.6–17.2)
IMM GRANULOCYTES # BLD AUTO: 0.1 K/UL (ref 0–0.5)
IMM GRANULOCYTES NFR BLD AUTO: 1 % (ref 0–5)
LDLC SERPL CALC-MCNC: 97.4 MG/DL
LYMPHOCYTES # BLD: 1.9 K/UL (ref 0.5–4.6)
LYMPHOCYTES NFR BLD: 27 % (ref 13–44)
MCH RBC QN AUTO: 32.1 PG (ref 26.1–32.9)
MCHC RBC AUTO-ENTMCNC: 32.1 G/DL (ref 31.4–35)
MCV RBC AUTO: 100 FL (ref 82–102)
MONOCYTES # BLD: 0.7 K/UL (ref 0.1–1.3)
MONOCYTES NFR BLD: 10 % (ref 4–12)
NEUTS SEG # BLD: 4.2 K/UL (ref 1.7–8.2)
NEUTS SEG NFR BLD: 59 % (ref 43–78)
NRBC # BLD: 0 K/UL (ref 0–0.2)
PLATELET # BLD AUTO: 281 K/UL (ref 150–450)
PMV BLD AUTO: 10.2 FL (ref 9.4–12.3)
POTASSIUM SERPL-SCNC: 4.7 MMOL/L (ref 3.5–5.1)
PROT SERPL-MCNC: 7.1 G/DL (ref 6.3–8.2)
RBC # BLD AUTO: 4.18 M/UL (ref 4.23–5.6)
SODIUM SERPL-SCNC: 140 MMOL/L (ref 133–143)
TRIGL SERPL-MCNC: 68 MG/DL (ref 35–150)
VLDLC SERPL CALC-MCNC: 13.6 MG/DL (ref 6–23)
WBC # BLD AUTO: 7.2 K/UL (ref 4.3–11.1)

## 2023-05-08 PROCEDURE — 3075F SYST BP GE 130 - 139MM HG: CPT | Performed by: FAMILY MEDICINE

## 2023-05-08 PROCEDURE — 3078F DIAST BP <80 MM HG: CPT | Performed by: FAMILY MEDICINE

## 2023-05-08 PROCEDURE — 99214 OFFICE O/P EST MOD 30 MIN: CPT | Performed by: FAMILY MEDICINE

## 2023-05-08 SDOH — ECONOMIC STABILITY: INCOME INSECURITY: HOW HARD IS IT FOR YOU TO PAY FOR THE VERY BASICS LIKE FOOD, HOUSING, MEDICAL CARE, AND HEATING?: PATIENT DECLINED

## 2023-05-08 SDOH — ECONOMIC STABILITY: HOUSING INSECURITY
IN THE LAST 12 MONTHS, WAS THERE A TIME WHEN YOU DID NOT HAVE A STEADY PLACE TO SLEEP OR SLEPT IN A SHELTER (INCLUDING NOW)?: PATIENT REFUSED

## 2023-05-08 SDOH — ECONOMIC STABILITY: FOOD INSECURITY: WITHIN THE PAST 12 MONTHS, THE FOOD YOU BOUGHT JUST DIDN'T LAST AND YOU DIDN'T HAVE MONEY TO GET MORE.: PATIENT DECLINED

## 2023-05-08 SDOH — ECONOMIC STABILITY: FOOD INSECURITY: WITHIN THE PAST 12 MONTHS, YOU WORRIED THAT YOUR FOOD WOULD RUN OUT BEFORE YOU GOT MONEY TO BUY MORE.: PATIENT DECLINED

## 2023-05-08 ASSESSMENT — PATIENT HEALTH QUESTIONNAIRE - PHQ9
2. FEELING DOWN, DEPRESSED OR HOPELESS: 0
SUM OF ALL RESPONSES TO PHQ QUESTIONS 1-9: 0
SUM OF ALL RESPONSES TO PHQ QUESTIONS 1-9: 0
1. LITTLE INTEREST OR PLEASURE IN DOING THINGS: 0
SUM OF ALL RESPONSES TO PHQ9 QUESTIONS 1 & 2: 0
SUM OF ALL RESPONSES TO PHQ QUESTIONS 1-9: 0
SUM OF ALL RESPONSES TO PHQ QUESTIONS 1-9: 0

## 2023-05-08 ASSESSMENT — ENCOUNTER SYMPTOMS
BLURRED VISION: 0
EYE DISCHARGE: 0
ORTHOPNEA: 0
RESPIRATORY NEGATIVE: 1
EYE PAIN: 0
PHOTOPHOBIA: 0
EYES NEGATIVE: 1
EYE ITCHING: 0

## 2023-05-08 NOTE — PROGRESS NOTES
[address] [place] [address]  _______________________________________  Krys Castro MD                 [address] [place] [address],  [address]. [place], [address] [place] [address]                     ReneaSabrina                                                                                     Phone: [phone]                                                                                    Fax: [phone]    Cassandra David is a 59 y.o. male who is seen for evaluation of   Chief Complaint   Patient presents with    Cholesterol Problem    Hypertension    Depression       HPI:   Hypertension  This is a chronic problem. Progression since onset: on meds, need reiflls and labs, no side effect noted. Pertinent negatives include no anxiety, blurred vision, chest pain, malaise/fatigue, neck pain, orthopnea or peripheral edema. Depression  Visit Type: follow-up  Episode frequency: stable on meds, need reiflls and labs, no side effect noted. Leg Pain   Incident location: bilat leg pain, neurogenic claudication from spinal stenosis, see below. Heart Problem  Episode onset: chronic CAD, on meds, need reiflls, no side effect noted, see below, had recent stent. Pertinent negatives include no chest pain, diaphoresis, fatigue or neck pain. Chief Complaint   Patient presents with    Cholesterol Problem    Hypertension    Depression         Review of Systems:    Review of Systems   Constitutional:  Negative for activity change, diaphoresis, fatigue and malaise/fatigue. HENT: Negative. Eyes: Negative. Negative for blurred vision, photophobia, pain, discharge and itching. Respiratory: Negative. Cardiovascular:  Negative for chest pain and orthopnea. Endocrine: Negative. Genitourinary: Negative. Negative for difficulty urinating and flank pain. Musculoskeletal:  Negative for neck pain. Psychiatric/Behavioral:  Positive for depression.       History:  Past Medical History:   Diagnosis Date   

## 2023-07-29 NOTE — PROGRESS NOTES
Pawan Perla  : 1965  Payor: [id number] / Plan: [address] / Product Type: PPO /  [address], [address], [address] [address]  [phone]   [phone]                                                          Donnell Mcduffie MD      OUTPATIENT PHYSICAL THERAPY: Daily Treatment Note 10/5/2020   Visit Count:  5    Tx Diagnosis:  Pain in Right Hip (M25.551)  Stiffness of Right Hip, Not elsewhere classified (M25.651)  Stiffness of Right Knee, Not elsewhere classified (M25.661)  Other abnormalities of gait and mobility (R26.89)  Low Back Pain (M54.5)  Abnormal posture (R29.3)   Spondylolisthesis at L3-L4 level [M43.16]  Lumbar stenosis with neurogenic claudication [M48.062]        Pre-treatment Symptoms/Complaints:   Pt report not 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 Still wearing temp heel lift without complaints. Will try more perm heel lift next visit. Pain: Initial: 5/10 Post Session: -5/10   Medications Last Reviewed:  10/5/2020     Updated Objective Findings: See Initial Eval for more details. TREATMENT:   THERAPEUTIC EXERCISE: (55 min)  Exercises per grid below to improve mobility, strength and balance. Required minimal visual, verbal and manual cues to promote proper body alignment and promote proper body posture. Progressed resistance and complexity of movement as indicated.      Date:  2020 Date:  20 Date:  10/1/20 10/2/20 10/5/20   Activity/Exercise Parameters Parameters Parameters     Education HEP, POC, PT goals, anatomy/pathology, use of ice,  HEP, POC  New HEP, posture  Use of heel lift, new HEP    Quad set  Left X 10, 5 sec hold   Standing       Calf stretching  NV 2 x 30 sec       Stairs  X 2 laps nustep   X 8 min level 4 10 min level 6   10 min level 3 (scifit)   Prone knee hang   With manual mobilizations x 5 min       Hip flexor stretch   Standing LE on box with quad set   Manually     iso multif  5 sec x 2 min      iso hip flex  3 x 5 sec B      Prone hip ext with knee at 90    X 7, 5 sec hold     Prone ham curls   X 10 left, focus on gentle iliopsoas stretch   X 10     clams  NV X 10, 5 sec holds HEP    TKE  NV NV SAQ with 10# x 20 reps     piriformis stretch  NV 3 x 20 sec right   Seated 3 sets 3x 10 sec holds B   Marching    2 x 40 ft      Side stepping    2 x 40 ft 0 resist  YTB 2 x 40ft    Lunge steps   2 x 40ft      LTR   X 3 min  X 5     Bridges      X 10, 5 sec With resist band next visit    Treadmill     3min 30 sec 1. 9mph   2 min 1.8 mph    Walking      4 X 3 loops    Sciatic nerve glide      slump test with head node x 10   Hip add     Ball squeeze seat 3 x 10 sec (2 sets)   Sit to stand      With 2# wand 2 x 10                THERAPEUTIC ACTIVITY: ( minutes): Activities per gid below to improve functional movement related mobility, strength and balance to improve neuro-muscular carryover to daily functional activities for improving patient's quality of life. Required visual, verbal and manual cues to promote proper body alignment and promote proper body posture/mechanics. Progressed resistance and complexity of movement as indicated. Date:  9/21/2020 Date:   Date:     Activity/Exercise Parameters Parameters Parameters   walking Focus on heel strike        Posture  Cuing for weight bearing                                                              MANUAL THERAPY: ( 0 minutes): Joint mobilization, Soft tissue mobilization was utilized and necessary because of the patient's restricted joint motion and restricted motion of soft tissue mobility.         Date  NOT today    Technique Used Grade  Level # Time(s) Effect while being performed   PA's IV- S1  3 bouts each Decreased joint stiffness    AP's  IV Right prox tib, in knee flexion and ext  3 bouts  Decreased joint stiffness    Hip distraction        Hip medial/lateral   Oscillating   Decrease tension surround joint    STM/MFR superficial  Over incision sight and lumbar paraspinals  10 min  Increased tissue mobility, decrease scar adhesion    Deep MFR/ trigger point release   Right gluteals 7 min  Decrease muscle muscle spasm    Hip flexor stretch OP with prone knee flexion  Right    decreased muscle tension of right iliopsoas      Modalities: to reduce inflammation    · None today               HEP Log Date 1. Standing hip flex stretch  9/21/2020   2. Knee hang QS 9/21/2020   3. Piriformis stretch  10/5/2020      4. Heel strike with gait  10/5/2020      5. iso hip flex 10/5/2020    6. Clamshell  10/5/2020     7. Piriformis     Marching     Bridges  10/5/2020          [facility] Portal  Treatment/Session Summary:    Response to Treatment: Focused on increasing walking tolerance and endurance- intervals of walking with free weights and seated hip stretching, joint distraction and nerve gliding. Pain right hip and low back increased with and limtied walking to 360ft each interval with relieved within 2-3 min of seated actvities. Significant challenge and R hip abductor fatigue with resisted side step demonstrating functional weakness. Will continue to address often and added to HEP. Communication/Consultation:  None    Equipment provided today: none today      Recommendations/Intent for next treatment session:   Next visit will focus on Manual Therapy Core Stability Quad strengthening Hip strengthening. Add t-band to bridges, continue with hip and core strengthening and walking tolerance. Treatment Plan of Care Effective Dates: 9/21/2020 TO 12/21/2020 (90 days).   Frequency/Duration: 2 times a week for 90 Days       Total Treatment Billable Duration: 55 min Rx      PT Patient Time In/Time Out  Time In: 1000  Time Out: 43 [facility], PT    Future Appointments   Date Time Provider Aria Lorena   10/8/2020 10:00 AM Geofm Leonela, PT [facility] [place] [facility]   10/12/2020 10:00 AM Turner Patient, DPT [facility]   10/15/2020 10:00 AM Geofm Leonela, PT [facility]   10/19/2020 10:00 AM Geofm Ramseyer, PT [facility]   10/22/2020 10:00 AM Geofm Cofrencher, PT [facility]   10/26/2020 10:00 AM Geofm Ramseyer, PT [facility]   10/29/2020 10:00 AM Geofm Cofrencher, PT [facility]   12/1/2020  8:30 AM Lona Rios Mt, MD [facility] CBC:6.6/20 .

## 2025-03-24 NOTE — TELEPHONE ENCOUNTER
Patient was contacted regarding clearance for his spine injection.
Pts returning call to Esvin Flores
room air
room air

## (undated) DEVICE — CONTAINER,SPECIMEN,O.R.STRL,4.5OZ: Brand: MEDLINE

## (undated) DEVICE — STERILE PACKAGE WITH CANNULA: Brand: LITE BIO DELIVERY SYSTEM

## (undated) DEVICE — CANNULA NSL ORAL AD FOR CAPNOFLEX CO2 O2 AIRLFE

## (undated) DEVICE — BLADE ASSEMB CLP HAIR FINE --

## (undated) DEVICE — GOWN,BREATHABLE SLV,AURORA,LG,STRL: Brand: MEDLINE

## (undated) DEVICE — SUTURE VCRL + SZ 3-0 L18IN ABSRB UD SH 1/2 CIR TAPERCUT NDL VCP864D

## (undated) DEVICE — BUTTON SWITCH PENCIL BLADE ELECTRODE, HOLSTER: Brand: EDGE

## (undated) DEVICE — SPINE NEURO: Brand: MEDLINE INDUSTRIES, INC.

## (undated) DEVICE — 3M™ TEGADERM™ TRANSPARENT FILM DRESSING FRAME STYLE, 1626W, 4 IN X 4-3/4 IN (10 CM X 12 CM), 50/CT 4CT/CASE: Brand: 3M™ TEGADERM™

## (undated) DEVICE — 1840 FOAM BLOCK NEEDLE COUNTER: Brand: DEVON

## (undated) DEVICE — PACK PROCEDURE SURG POST LAMINECTOMY CDS

## (undated) DEVICE — DRAPE TWL SURG 16X26IN BLU ORB04] ALLCARE INC]

## (undated) DEVICE — KENDALL RADIOLUCENT FOAM MONITORING ELECTRODE RECTANGULAR SHAPE: Brand: KENDALL

## (undated) DEVICE — CONNECTOR TBNG OD5-7MM O2 END DISP

## (undated) DEVICE — SYR 3ML LL TIP 1/10ML GRAD --

## (undated) DEVICE — PACKING 8004008 NEURAY 200PK 25X76MM: Brand: NEURAY ®

## (undated) DEVICE — SYR LR LCK 1ML GRAD NSAF 30ML --

## (undated) DEVICE — JAM SHIDI: Brand: XIA PRECISION SYSTEM

## (undated) DEVICE — GOWN,PREVENTION PLUS,2XL,ST,22/CS: Brand: MEDLINE

## (undated) DEVICE — SOLUTION IV 1000ML 0.9% SOD CHL

## (undated) DEVICE — Z CONVERTED USE 2421973 CONTAINER SPEC 60/30ML 10% FRMLN POLYPR PREFIL

## (undated) DEVICE — NDL PRT INJ NSAF BLNT 18GX1.5 --

## (undated) DEVICE — KENDALL SCD EXPRESS SLEEVES, KNEE LENGTH, MEDIUM: Brand: KENDALL SCD

## (undated) DEVICE — SYR 10ML LUER LOK 1/5ML GRAD --

## (undated) DEVICE — DRAIN SURG W7MMXL20CM SIL FULL PERF HUBLESS FLAT RADPQ STRP

## (undated) DEVICE — REM POLYHESIVE ADULT PATIENT RETURN ELECTRODE: Brand: VALLEYLAB

## (undated) DEVICE — STAPLER EXT SKIN 35 WIDE S STL STPL SQUEEZE HNDL VISISTAT

## (undated) DEVICE — BIPOLAR SEALER 23-112-1 AQM 6.0: Brand: AQUAMANTYS ®

## (undated) DEVICE — AGENT HEMSTAT W3XL4IN OXIDIZED REGENERATED CELOS ABSRB FOR

## (undated) DEVICE — 3M™ TEGADERM™ TRANSPARENT FILM DRESSING FRAME STYLE, 1628, 6 IN X 8 IN (15 CM X 20 CM), 10/CT 8CT/CASE: Brand: 3M™ TEGADERM™

## (undated) DEVICE — PAD,NON-ADHERENT,3X8,STERILE,LF,1/PK: Brand: MEDLINE

## (undated) DEVICE — BIPOLAR SEALER 23-113-1 AQM 2.3 OM NEURO: Brand: AQUAMANTYS ®

## (undated) DEVICE — COVER,TABLE,HEAVY DUTY,79"X110",STRL: Brand: MEDLINE

## (undated) DEVICE — POWDER HEMOSTAT GEL 1GR --

## (undated) DEVICE — 1010 S-DRAPE TOWEL DRAPE 10/BX: Brand: STERI-DRAPE™

## (undated) DEVICE — SUTURE VCRL VIO BR 0 18IN C/R M04 J701D

## (undated) DEVICE — TRAY CATH OD16FR SIL URIN M STATLOK STBL DEV SURSTP

## (undated) DEVICE — DRAPE XR C ARM 41X74IN LF --

## (undated) DEVICE — STANDARD HYPODERMIC NEEDLE,POLYPROPYLENE HUB: Brand: MONOJECT

## (undated) DEVICE — WAX SURG 2.5GM HEMSTAT BNE BEESWAX PARAFFIN ISO PALMITATE

## (undated) DEVICE — SURGIFOAM SPNG SZ 100

## (undated) DEVICE — KIT POS PT CARE KT W/ GENTLE TCH WILSON +

## (undated) DEVICE — 2000CC GUARDIAN II: Brand: GUARDIAN

## (undated) DEVICE — AGENT HEMSTAT 8ML FLX TIP MTRX + DISP SURGIFLO

## (undated) DEVICE — INTENDED FOR TISSUE SEPARATION, AND OTHER PROCEDURES THAT REQUIRE A SHARP SURGICAL BLADE TO PUNCTURE OR CUT.: Brand: BARD-PARKER SAFETY BLADES SIZE 15, STERILE

## (undated) DEVICE — AMD ANTIMICROBIAL NON-ADHERENT PAD,0.2% POLYHEXAMETHYLENE BIGUANIDE HCI (PHMB): Brand: TELFA

## (undated) DEVICE — ADHESIVE SKIN CLOSURE 4X22 CM PREMIERPRO EXOFINFUSION DISP

## (undated) DEVICE — SYR 5ML 1/5 GRAD LL NSAF LF --

## (undated) DEVICE — (D)PREP SKN CHLRAPRP APPL 26ML -- CONVERT TO ITEM 371833

## (undated) DEVICE — SUT ETHLN 3-0 18IN PS1 BLK --

## (undated) DEVICE — INTENDED FOR TISSUE SEPARATION, AND OTHER PROCEDURES THAT REQUIRE A SHARP SURGICAL BLADE TO PUNCTURE OR CUT.: Brand: BARD-PARKER ® STAINLESS STEEL BLADES

## (undated) DEVICE — NEUROMONITORING KIT

## (undated) DEVICE — Device

## (undated) DEVICE — CODMAN VERSATRU™ STANDARD DISPOSABLE NON-STICK BIPOLAR FORCEPS 9" (23CM), 1.5MM TIP: Brand: CODMAN VERSATRU™

## (undated) DEVICE — GARMENT,MEDLINE,DVT,INT,CALF,MED, GEN2: Brand: MEDLINE

## (undated) DEVICE — CATHETER IV 14GA L1.25IN PTFE ORNG FEP SFTY STR HUB RADPQ

## (undated) DEVICE — DRSG AQUACEL SURG 3.5X6IN -- CONVERT TO ITEM 369227

## (undated) DEVICE — TELFA NON-ADHERENT ABSORBENT DRESSING: Brand: TELFA